# Patient Record
Sex: MALE | Race: WHITE | NOT HISPANIC OR LATINO | Employment: OTHER | URBAN - METROPOLITAN AREA
[De-identification: names, ages, dates, MRNs, and addresses within clinical notes are randomized per-mention and may not be internally consistent; named-entity substitution may affect disease eponyms.]

---

## 2018-07-02 ENCOUNTER — HOSPITAL ENCOUNTER (OUTPATIENT)
Dept: RADIOLOGY | Facility: MEDICAL CENTER | Age: 78
End: 2018-07-02

## 2018-07-02 ENCOUNTER — HOSPITAL ENCOUNTER (INPATIENT)
Facility: MEDICAL CENTER | Age: 78
LOS: 3 days | DRG: 065 | End: 2018-07-05
Attending: INTERNAL MEDICINE | Admitting: INTERNAL MEDICINE
Payer: COMMERCIAL

## 2018-07-02 LAB
ALBUMIN SERPL BCP-MCNC: 4.7 G/DL (ref 3.2–4.9)
ALBUMIN/GLOB SERPL: 1.7 G/DL
ALP SERPL-CCNC: 60 U/L (ref 30–99)
ALT SERPL-CCNC: 12 U/L (ref 2–50)
ANION GAP SERPL CALC-SCNC: 10 MMOL/L (ref 0–11.9)
AST SERPL-CCNC: 17 U/L (ref 12–45)
BILIRUB SERPL-MCNC: 1.1 MG/DL (ref 0.1–1.5)
BUN SERPL-MCNC: 16 MG/DL (ref 8–22)
CALCIUM SERPL-MCNC: 10 MG/DL (ref 8.5–10.5)
CHLORIDE SERPL-SCNC: 99 MMOL/L (ref 96–112)
CO2 SERPL-SCNC: 25 MMOL/L (ref 20–33)
CREAT SERPL-MCNC: 1.17 MG/DL (ref 0.5–1.4)
ERYTHROCYTE [DISTWIDTH] IN BLOOD BY AUTOMATED COUNT: 61 FL (ref 35.9–50)
GLOBULIN SER CALC-MCNC: 2.8 G/DL (ref 1.9–3.5)
GLUCOSE SERPL-MCNC: 114 MG/DL (ref 65–99)
HCT VFR BLD AUTO: 51.8 % (ref 42–52)
HGB BLD-MCNC: 17.2 G/DL (ref 14–18)
MCH RBC QN AUTO: 31.3 PG (ref 27–33)
MCHC RBC AUTO-ENTMCNC: 33.2 G/DL (ref 33.7–35.3)
MCV RBC AUTO: 94.4 FL (ref 81.4–97.8)
PLATELET # BLD AUTO: 287 K/UL (ref 164–446)
PMV BLD AUTO: 10.5 FL (ref 9–12.9)
POTASSIUM SERPL-SCNC: 3.7 MMOL/L (ref 3.6–5.5)
PROT SERPL-MCNC: 7.5 G/DL (ref 6–8.2)
RBC # BLD AUTO: 5.49 M/UL (ref 4.7–6.1)
SODIUM SERPL-SCNC: 134 MMOL/L (ref 135–145)
WBC # BLD AUTO: 9.2 K/UL (ref 4.8–10.8)

## 2018-07-02 PROCEDURE — 770020 HCHG ROOM/CARE - TELE (206)

## 2018-07-02 PROCEDURE — 85027 COMPLETE CBC AUTOMATED: CPT

## 2018-07-02 PROCEDURE — 94760 N-INVAS EAR/PLS OXIMETRY 1: CPT

## 2018-07-02 PROCEDURE — 80053 COMPREHEN METABOLIC PANEL: CPT

## 2018-07-02 PROCEDURE — 700105 HCHG RX REV CODE 258: Performed by: STUDENT IN AN ORGANIZED HEALTH CARE EDUCATION/TRAINING PROGRAM

## 2018-07-02 PROCEDURE — 36415 COLL VENOUS BLD VENIPUNCTURE: CPT

## 2018-07-02 RX ORDER — LANOLIN ALCOHOL/MO/W.PET/CERES
2000 CREAM (GRAM) TOPICAL DAILY
COMMUNITY

## 2018-07-02 RX ORDER — BISACODYL 10 MG
10 SUPPOSITORY, RECTAL RECTAL
Status: DISCONTINUED | OUTPATIENT
Start: 2018-07-02 | End: 2018-07-05 | Stop reason: HOSPADM

## 2018-07-02 RX ORDER — AMLODIPINE BESYLATE 2.5 MG/1
2.5 TABLET ORAL DAILY
Status: ON HOLD | COMMUNITY
End: 2018-07-05

## 2018-07-02 RX ORDER — AMOXICILLIN 250 MG
2 CAPSULE ORAL 2 TIMES DAILY
Status: DISCONTINUED | OUTPATIENT
Start: 2018-07-02 | End: 2018-07-05 | Stop reason: HOSPADM

## 2018-07-02 RX ORDER — POLYETHYLENE GLYCOL 3350 17 G/17G
1 POWDER, FOR SOLUTION ORAL
Status: DISCONTINUED | OUTPATIENT
Start: 2018-07-02 | End: 2018-07-05 | Stop reason: HOSPADM

## 2018-07-02 RX ORDER — SODIUM CHLORIDE 9 MG/ML
INJECTION, SOLUTION INTRAVENOUS CONTINUOUS
Status: DISCONTINUED | OUTPATIENT
Start: 2018-07-02 | End: 2018-07-04

## 2018-07-02 RX ORDER — TAMSULOSIN HYDROCHLORIDE 0.4 MG/1
0.4 CAPSULE ORAL
COMMUNITY
End: 2020-09-07

## 2018-07-02 RX ORDER — LOSARTAN POTASSIUM 50 MG/1
50 TABLET ORAL DAILY
Status: ON HOLD | COMMUNITY
End: 2018-07-05

## 2018-07-02 RX ADMIN — SODIUM CHLORIDE: 9 INJECTION, SOLUTION INTRAVENOUS at 20:52

## 2018-07-02 ASSESSMENT — COPD QUESTIONNAIRES
COPD SCREENING SCORE: 4
DURING THE PAST 4 WEEKS HOW MUCH DID YOU FEEL SHORT OF BREATH: NONE/LITTLE OF THE TIME
DO YOU EVER COUGH UP ANY MUCUS OR PHLEGM?: NO/ONLY WITH OCCASIONAL COLDS OR INFECTIONS
IN THE PAST 12 MONTHS DO YOU DO LESS THAN YOU USED TO BECAUSE OF YOUR BREATHING PROBLEMS: DISAGREE/UNSURE
HAVE YOU SMOKED AT LEAST 100 CIGARETTES IN YOUR ENTIRE LIFE: YES
HAVE YOU SMOKED AT LEAST 100 CIGARETTES IN YOUR ENTIRE LIFE: YES
DURING THE PAST 4 WEEKS HOW MUCH DID YOU FEEL SHORT OF BREATH: NONE/LITTLE OF THE TIME
COPD SCREENING SCORE: 4
DO YOU EVER COUGH UP ANY MUCUS OR PHLEGM?: NO/ONLY WITH OCCASIONAL COLDS OR INFECTIONS

## 2018-07-02 ASSESSMENT — COGNITIVE AND FUNCTIONAL STATUS - GENERAL
SUGGESTED CMS G CODE MODIFIER MOBILITY: CH
SUGGESTED CMS G CODE MODIFIER DAILY ACTIVITY: CH
MOBILITY SCORE: 24
DAILY ACTIVITIY SCORE: 24

## 2018-07-02 ASSESSMENT — PAIN SCALES - GENERAL: PAINLEVEL_OUTOF10: 0

## 2018-07-02 ASSESSMENT — LIFESTYLE VARIABLES
EVER_SMOKED: YES
ALCOHOL_USE: NO

## 2018-07-02 ASSESSMENT — PATIENT HEALTH QUESTIONNAIRE - PHQ9
1. LITTLE INTEREST OR PLEASURE IN DOING THINGS: NOT AT ALL
2. FEELING DOWN, DEPRESSED, IRRITABLE, OR HOPELESS: NOT AT ALL
2. FEELING DOWN, DEPRESSED, IRRITABLE, OR HOPELESS: NOT AT ALL
SUM OF ALL RESPONSES TO PHQ9 QUESTIONS 1 AND 2: 0
1. LITTLE INTEREST OR PLEASURE IN DOING THINGS: NOT AT ALL
SUM OF ALL RESPONSES TO PHQ9 QUESTIONS 1 AND 2: 0

## 2018-07-03 ENCOUNTER — APPOINTMENT (OUTPATIENT)
Dept: RADIOLOGY | Facility: MEDICAL CENTER | Age: 78
DRG: 065 | End: 2018-07-03
Attending: RADIOLOGY
Payer: COMMERCIAL

## 2018-07-03 PROBLEM — I35.0 SEVERE AORTIC STENOSIS: Status: ACTIVE | Noted: 2018-07-03

## 2018-07-03 PROBLEM — I63.131 CEREBROVASCULAR ACCIDENT (CVA) DUE TO EMBOLISM OF RIGHT CAROTID ARTERY (HCC): Status: ACTIVE | Noted: 2018-07-03

## 2018-07-03 PROBLEM — J44.9 COPD (CHRONIC OBSTRUCTIVE PULMONARY DISEASE) (HCC): Status: ACTIVE | Noted: 2018-07-03

## 2018-07-03 PROBLEM — I10 HTN (HYPERTENSION): Status: ACTIVE | Noted: 2018-07-03

## 2018-07-03 LAB
CHOLEST SERPL-MCNC: 123 MG/DL (ref 100–199)
HDLC SERPL-MCNC: 32 MG/DL
INR PPP: 1.1 (ref 0.87–1.13)
LDLC SERPL CALC-MCNC: 72 MG/DL
MAGNESIUM SERPL-MCNC: 2.1 MG/DL (ref 1.5–2.5)
PROTHROMBIN TIME: 13.9 SEC (ref 12–14.6)
TRIGL SERPL-MCNC: 97 MG/DL (ref 0–149)

## 2018-07-03 PROCEDURE — 83735 ASSAY OF MAGNESIUM: CPT

## 2018-07-03 PROCEDURE — 36415 COLL VENOUS BLD VENIPUNCTURE: CPT

## 2018-07-03 PROCEDURE — 700101 HCHG RX REV CODE 250: Performed by: STUDENT IN AN ORGANIZED HEALTH CARE EDUCATION/TRAINING PROGRAM

## 2018-07-03 PROCEDURE — 770020 HCHG ROOM/CARE - TELE (206)

## 2018-07-03 PROCEDURE — 700105 HCHG RX REV CODE 258: Performed by: STUDENT IN AN ORGANIZED HEALTH CARE EDUCATION/TRAINING PROGRAM

## 2018-07-03 PROCEDURE — 700102 HCHG RX REV CODE 250 W/ 637 OVERRIDE(OP): Performed by: STUDENT IN AN ORGANIZED HEALTH CARE EDUCATION/TRAINING PROGRAM

## 2018-07-03 PROCEDURE — 99223 1ST HOSP IP/OBS HIGH 75: CPT | Mod: GC | Performed by: INTERNAL MEDICINE

## 2018-07-03 PROCEDURE — 85610 PROTHROMBIN TIME: CPT

## 2018-07-03 PROCEDURE — A9270 NON-COVERED ITEM OR SERVICE: HCPCS | Performed by: STUDENT IN AN ORGANIZED HEALTH CARE EDUCATION/TRAINING PROGRAM

## 2018-07-03 PROCEDURE — 80061 LIPID PANEL: CPT

## 2018-07-03 RX ORDER — AMLODIPINE BESYLATE 5 MG/1
5 TABLET ORAL DAILY
Status: DISCONTINUED | OUTPATIENT
Start: 2018-07-04 | End: 2018-07-04

## 2018-07-03 RX ORDER — LOSARTAN POTASSIUM 50 MG/1
100 TABLET ORAL DAILY
Status: DISCONTINUED | OUTPATIENT
Start: 2018-07-04 | End: 2018-07-05 | Stop reason: HOSPADM

## 2018-07-03 RX ORDER — LOSARTAN POTASSIUM 50 MG/1
50 TABLET ORAL DAILY
Status: DISCONTINUED | OUTPATIENT
Start: 2018-07-03 | End: 2018-07-03

## 2018-07-03 RX ORDER — CLOPIDOGREL BISULFATE 75 MG/1
75 TABLET ORAL DAILY
Status: DISCONTINUED | OUTPATIENT
Start: 2018-07-03 | End: 2018-07-05 | Stop reason: HOSPADM

## 2018-07-03 RX ORDER — ATORVASTATIN CALCIUM 80 MG/1
80 TABLET, FILM COATED ORAL NIGHTLY
COMMUNITY
End: 2020-09-07

## 2018-07-03 RX ORDER — CLOPIDOGREL BISULFATE 75 MG/1
75 TABLET ORAL DAILY
Status: ON HOLD | COMMUNITY
End: 2018-07-05

## 2018-07-03 RX ORDER — ATORVASTATIN CALCIUM 80 MG/1
80 TABLET, FILM COATED ORAL
Status: DISCONTINUED | OUTPATIENT
Start: 2018-07-03 | End: 2018-07-05 | Stop reason: HOSPADM

## 2018-07-03 RX ORDER — AMLODIPINE BESYLATE 5 MG/1
2.5 TABLET ORAL DAILY
Status: DISCONTINUED | OUTPATIENT
Start: 2018-07-03 | End: 2018-07-03

## 2018-07-03 RX ORDER — UBIDECARENONE 75 MG
100 CAPSULE ORAL DAILY
Status: DISCONTINUED | OUTPATIENT
Start: 2018-07-03 | End: 2018-07-05 | Stop reason: HOSPADM

## 2018-07-03 RX ORDER — LABETALOL HYDROCHLORIDE 5 MG/ML
10 INJECTION, SOLUTION INTRAVENOUS EVERY 4 HOURS PRN
Status: DISCONTINUED | OUTPATIENT
Start: 2018-07-03 | End: 2018-07-05

## 2018-07-03 RX ADMIN — ATORVASTATIN CALCIUM 80 MG: 80 TABLET, FILM COATED ORAL at 20:46

## 2018-07-03 RX ADMIN — SODIUM CHLORIDE: 9 INJECTION, SOLUTION INTRAVENOUS at 09:01

## 2018-07-03 RX ADMIN — LABETALOL HYDROCHLORIDE 10 MG: 5 INJECTION, SOLUTION INTRAVENOUS at 15:51

## 2018-07-03 RX ADMIN — SODIUM CHLORIDE: 9 INJECTION, SOLUTION INTRAVENOUS at 21:17

## 2018-07-03 ASSESSMENT — ENCOUNTER SYMPTOMS
WEAKNESS: 0
SPEECH CHANGE: 0
TREMORS: 1
TINGLING: 0
PHOTOPHOBIA: 0
PALPITATIONS: 0
COUGH: 0
DOUBLE VISION: 0
ORTHOPNEA: 0
FOCAL WEAKNESS: 0
DIZZINESS: 0
VOMITING: 0
HEADACHES: 0
LOSS OF CONSCIOUSNESS: 0
NAUSEA: 0
BLURRED VISION: 0
TREMORS: 0
FEVER: 0
SENSORY CHANGE: 0
SHORTNESS OF BREATH: 0
CHILLS: 0

## 2018-07-03 ASSESSMENT — PAIN SCALES - GENERAL
PAINLEVEL_OUTOF10: 0

## 2018-07-03 ASSESSMENT — LIFESTYLE VARIABLES: SUBSTANCE_ABUSE: 1

## 2018-07-03 NOTE — PROGRESS NOTES
Pt resting in bed at this time.  No complaints of pain, call light within reach.  Bed locked and in lowest position.

## 2018-07-03 NOTE — H&P
Senior Admission Note    In summary: Bg Andrea is a 77 y.o. male with past medical history of  HTN, DLD, COPD, and BPH was transferred to [facility] from [facility] for IR intervention. [facility] and shakiness. On admission patient was out of tPA window and CT head was negative. Patient was started on high dose statin. [facility] and did not recommended anticoagulation due to increase risk of hemorrhagic conversion. MRI showed concern for embolic or watershed distribution. Patient was also found to have distal right ICA stenosis as it enters the inferior portion of the cavernous sinus. Case was discussed with IR here in [facility] and that recommended starting the patient on aspirin and Plavix.       Physical Exam   Constitutional:  oriented to person, place, and time. No distress.   HEENT: grossly normal   Cardiovascular: Normal heart rate, Normal rhythm   Lungs: Respiratory effort is normal. Normal breath sounds  Abdomen: Bowel sounds normal, Soft, No tenderness  Skin: No erythema, No rash  Lower limbs: normal, no pitting edema   Neurologic: Alert & oriented x 3, Normal motor function, Normal sensory function, No focal deficits noted, cranial nerves II through XII are normal  PSY: stable mood.       Pertinent studies: MRI showed concern for embolic or watershed distribution. Patient was also found to have distal right ICA stenosis.        Assessment and plan in summary:    #CVA  - [facility] due to Right ICA stenosis   - found to have multiple right-side infarct most likely secondary to carotid artery embolism.   - at the time of evaluation patient did not had any neurologic deficits.   Plan   -telemetry   - NPO for IR procedure today. [facility] the [facility] discussed the case with IR (Dr Burrows)   - IV fluids   - Neuro checks/fall/seizure precautions.   - atorvastatin 80mg   - ASA and Plavix -  patient [facility] the [facility]   - BP control - home medications losartan and amlodipine  - PT evaluation      #COPD  - history of COPD   - no acute exacerbation. No wheezing on exam   - RT protocol / pulse ox     # HTN   - continue home medications     # Severe aortic stenosis   - [facility]. Patient not interested in further treatment at this time       For full plan, please see Intern note for details   Rodger Olmstead M.D.  PGY 2

## 2018-07-03 NOTE — PROGRESS NOTES
BP recheck 183/86. Pt denies pain/discomfort. Unlabored breathing on room air. Continuing to monitor.

## 2018-07-03 NOTE — PROGRESS NOTES
Patient arrived as a direct admit in stable condition. aaox4 able to verbalize needs. Plan of care updated and verbalizes understanding. No pain and no distress noted at this time. Patient aware of NPO order in preparation for IR procedure tomorrow. Patient tolerating IVF 83cc/hr with no complications. Patient ambulatory with needs met at this time. Will continue to monitor closely. Safety measures in place. Call light within reach

## 2018-07-03 NOTE — THERAPY
asked by residents to see pt for swallow eval this AM. Pt NPO for procedure late this afternoon but per MD is okay for limited amount of PO for the swallow eval. No orders entered this AM. SLP d/w nurs following conversation with MD's and with request for orders. SLP f/u with nurs at approx noon regarding re-request for swallow eval orders. SLP also collaborated with nurs with plan for eval on Tues since swallow eval will not occur until early this afternoon and pt is pending a procedure that requires NPO. [email or web address]

## 2018-07-03 NOTE — PROGRESS NOTES
Direct admit from [facility]. Accepted by Dr. Mullins for carotid stenosis, CVA.  Please call the [facility] resident on pt arrival for orders. No written orders received.  Pt coming by ground.

## 2018-07-03 NOTE — ASSESSMENT & PLAN NOTE
-Pt had high BP up into the 190/100's throughout his hospital stay that was difficult to control, needed multiple prn meds including enalapril, labetolol, and clonidine  -increased home meds to losartan 100 mg qd, amlodipine 10 mg qd, and HCTZ 12.5 qd  -[facility]

## 2018-07-03 NOTE — PROGRESS NOTES
Bedside report received from night shift RN, assumed pt care. Pt assessment complete. Pt a&ox4, no complaints of pain. Reviewed plan of care with pt. Tele box on and rhythm verified. Chart and labs reviewed.  Bed in lowest position, call light within reach. Hourly rounding in place. Educated about calling for assistance.

## 2018-07-03 NOTE — PROGRESS NOTES
Skin assessment complete with Becky RN. Skin is warm, clean dry and intact. No open areas observed.

## 2018-07-03 NOTE — THERAPY
PT order received; Pt is currently awaiting plan of care to be established and possible IR procedure today. Will hold therapy evaluation and re-attempt as appropriate.

## 2018-07-03 NOTE — H&P
Internal Medicine Admitting History and Physical    Note Author: Ry Vieyra M.D.       Name Bg Andrea 1940   Age/Sex 77 y.o. male   MRN [id number]   Code Status Full     After 5PM or if no immediate response to page, please call for cross-coverage  Attending/Team: Dr. Zamudio See Patient List for primary contact information  Call [phone] to page    1st Call - Day Intern (R1):   Dr. Moreno 2nd Call - Day Sr. Resident (R2/R3):   Dr. Trimble       Chief Complaint:   CVA    HPI:  Mr. Andrea is a pleasant 77 year old male who [facility] acute/sub-acute CVA of likely embolic origin. [facility]  [facility]or and was found to have 3 areas of infarct on the right side of his brain. An ultrasound performed found <50% carotid stenosis. [facility] the [facility] he had received 2 days of dual antiplatelet therapy(clopidogrel/aspirin), but no tPA and was transferred for specialist care from .    On admission he denied any symptoms, stating that the previously mentioned left arm tremor had completely resolved. He denied any left sided weakness, vision changes, facial weakness, altered sensation, dizziness, dysphagia.    Review of Systems   Constitutional: Negative for chills and fever.   HENT: Negative for hearing loss.    Eyes: Negative for blurred vision, double vision and photophobia.   Respiratory: Negative for cough and shortness of breath.    Cardiovascular: Negative for chest pain.   Gastrointestinal: Negative for nausea and vomiting.   Skin: Negative for rash.   Neurological: Positive for tremors (now gone). Negative for dizziness, sensory change, focal weakness, weakness and headaches.   Psychiatric/Behavioral: Positive for substance abuse (smoking).             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 (Registered Nurse) on 18 at 1944  Med List Status: Complete   Medication Last Dose Status   amLODIPine (NORVASC) 2.5 MG Tab 2018 Active   cyanocobalamin (VITAMIN B-12) 100 MCG Tab 2018 Active   losartan (COZAAR) 50 MG Tab 2018 Active   tamsulosin (FLOMAX) 0.4 MG capsule 2018 Active   vitamin D (CHOLECALCIFEROL) 1000 UNIT Tab 2018 Active                Medication Allergy/Sensitivities:  No Known Allergies      Family History (mandatory)   Family History   Problem Relation Age of Onset   • Cancer Neg Hx    • Heart Disease Neg Hx    Does not recall other than parents  of \"old age\" both in 90s    Social History (mandatory)   Social History     Social History   • Marital status: N/A     Spouse name: N/A   • Number of children: N/A   • Years of education: N/A     Occupational History   • Not on file.     Social History Main Topics   • Smoking status: Current Every Day Smoker     Packs/day: 0.50     Years: 60.00     Types: Cigarettes     Start date: 1950   • Smokeless tobacco: Never Used   • Alcohol use Yes      Comment: very seldom   • Drug use: No   • Sexual activity: Not on file     Other Topics Concern   • Not on file     Social History Narrative   • No narrative on file     Living situation: Lives with Wife  [facility]    Physical Exam     Vitals:    18 1900 18 2244   SpO2:  94%   Weight: 78.9 kg (173 lb 15.1 oz)    Height: 0.67 m (2' 2.38\")      Body mass index is 175.76 kg/m².  Ht 0.67 m " "(2' 2.38\")   Wt 78.9 kg (173 lb 15.1 oz)   SpO2 94%   .76 kg/m²   O2 therapy: Pulse Oximetry: 94 %, O2 (LPM): 0    Physical Exam   Constitutional: He is oriented to person, place, and time and well-developed, well-nourished, and in no distress.   HENT:   Mallampati 3   Neck: Carotid bruit is not present.   Cardiovascular: Normal rate, regular rhythm, S1 normal, S2 normal, normal heart sounds and intact distal pulses.  Exam reveals no gallop.    No murmur heard.  Faint   Pulmonary/Chest: He has decreased breath sounds. He has no wheezes. He has no rales.   Abdominal: Bowel sounds are normal. There is no tenderness.   Musculoskeletal:        Right lower leg: He exhibits no edema.        Left lower leg: He exhibits no edema.   Neurological: He is alert and oriented to person, place, and time. He has normal sensation, normal strength and intact cranial nerves. He displays no tremor and facial symmetry. He has a normal Cerebellar Exam, a normal Finger-Nose-Finger Test and a normal Romberg Test. Gait normal.         Data Review       Old Records Request:   Completed  Current Records review/summary: Completed    Lab Data Review:  Recent Results (from the past 24 hour(s))   CBC without Differential    Collection Time: 07/02/18  8:52 PM   Result Value Ref Range    WBC 9.2 4.8 - 10.8 K/uL    RBC 5.49 4.70 - 6.10 M/uL    Hemoglobin 17.2 14.0 - 18.0 g/dL    Hematocrit 51.8 42.0 - 52.0 %    MCV 94.4 81.4 - 97.8 fL    MCH 31.3 27.0 - 33.0 pg    MCHC 33.2 (L) 33.7 - 35.3 g/dL    RDW 61.0 (H) 35.9 - 50.0 fL    Platelet Count 287 164 - 446 K/uL    MPV 10.5 9.0 - 12.9 fL   Comp Metabolic Panel (CMP)    Collection Time: 07/02/18  8:52 PM   Result Value Ref Range    Sodium 134 (L) 135 - 145 mmol/L    Potassium 3.7 3.6 - 5.5 mmol/L    Chloride 99 96 - 112 mmol/L    Co2 25 20 - 33 mmol/L    Anion Gap 10.0 0.0 - 11.9    Glucose 114 (H) 65 - 99 mg/dL    Bun 16 8 - 22 mg/dL    Creatinine 1.17 0.50 - 1.40 mg/dL    Calcium 10.0 8.5 " - 10.5 mg/dL    AST(SGOT) 17 12 - 45 U/L    ALT(SGPT) 12 2 - 50 U/L    Alkaline Phosphatase 60 30 - 99 U/L    Total Bilirubin 1.1 0.1 - 1.5 mg/dL    Albumin 4.7 3.2 - 4.9 g/dL    Total Protein 7.5 6.0 - 8.2 g/dL    Globulin 2.8 1.9 - 3.5 g/dL    A-G Ratio 1.7 g/dL   ESTIMATED GFR    Collection Time: 07/02/18  8:52 PM   Result Value Ref Range    GFR If African American >60 >60 mL/min/1.73 m 2    GFR If Non African American 60 >60 mL/min/1.73 m 2       Imaging/Procedures Review:    Independant Imaging Review: Completed  No orders to display        [facility] MRI: Multiple punctate ischemic events on right side; one in centrum semiovale and one in posterior parietal zone.     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 Did not receive tPA, was on Plavix/Aspirin when arrived. Clinically has completely recovered and has no complaints.   Will be seen by IR tomorrow for procedure; NPO at midnight with NS @ 83mL/hr  Continue Plavix 75/Aspirin 81 dual antiplatelet therapy  Holding pharmacologic DVT prophylaxis for high risk of conversion to hemorrhagic stroke - SCD  PT  Fall/sz precautions  Atorvastatin 80  Neuro checks Q4H  Per IR would like BP control - continuing home losartan 50 and amlodipine 2.5mg  Recommend to day team to follow up on echo/EKG results from [facility] to confirm that a. Fib was unlikely to be contributory.          HTN (hypertension)   Assessment & Plan    Per [facility] discharge summary IR d/c permissive HTN and would like HTN to be treated, likely because >48h have passed since CVA  Continue home losartan 50 and amlodipine 10          COPD (chronic obstructive pulmonary disease) (HCC)- (present on admission)   Assessment & Plan     History of COPD on albuterol/ipratroprium  RT protocol          Severe aortic stenosis- (present on admission)   Assessment & Plan    [facility] diagnosed [facility], but patient has stated in [facility] documentation that he is not interested in pursuing further treatment.              Anticipated Hospital stay:  >2 midnights        Quality Measures  Quality-Core Measures   Reviewed items::  Labs reviewed and Medications reviewed  Matt catheter::  No Matt  DVT prophylaxis pharmacological::  Contraindicated - High bleeding risk  DVT prophylaxis - mechanical:  SCDs    PCP: No primary care provider on file.

## 2018-07-03 NOTE — PROGRESS NOTES
Internal Medicine Interval Note  Note Author: Aure Moreno M.D.     Name Bg Andrea 1940   Age/Sex 77 y.o. male   MRN [id number]   Code Status Full     After 5PM or if no immediate response to page, please call for cross-coverage  Attending/Team: Dr. Zamudio/WALLY Cabrera See Patient List for primary contact information  Call [phone] to page    1st Call - Day Intern (R1):   Dr Moreno 2nd Call - Day Sr. Resident (R2/R3):   Dr Trimble         Reason for interval visit  (Principal Problem)   CVA      Interval Problem Daily Status Update  (24 hours, problem oriented, brief subjective history, new lab/imaging data pertinent to that problem)   [facility]. He was admitted after having sx of left arm tremors and weakness. [facility] in the [facility] ER initially did not comment on ICA stenosis but a second read noted right sided critical stenosis of the distal right ICA. [facility] the [facility] and they believed stenosis was too distal for endarterectomy and recommended stent placement by IR. Pt was then transferred to [facility] for IR procedure. The patient no longer is experiencing tremors or weakness and has no other neurological symptoms. He was started on ASA and plavix upon transfer. His BP was elevated since admission reaching 180/80 this afternoon, so his home med of 2.5 mg amlodipine was increased to 5 mg and home med losartan 25 mg qd was kept the same.   Review of Systems   Constitutional: Negative for chills and fever.   HENT: Positive for hearing loss.         Hearing loss is chronic problem   Eyes: Negative for blurred vision and double vision.   Respiratory: Negative for cough.    Cardiovascular: Negative for chest pain, palpitations and orthopnea.   Neurological: Negative for dizziness, tingling, tremors, sensory change, speech change, focal weakness, loss of consciousness and headaches.       Disposition/Barriers to discharge:    Patient needs to continue as an inpatient for evaluation and treatment of his CVA.     Consultants/Specialty  IR - Dr. Burrows  PCP: @PCP      Quality Measures  Quality-Core Measures   Reviewed items::  Labs reviewed, Medications reviewed and Radiology images reviewed  Matt catheter::  No Matt  DVT: being treated with plavix and asa for CVA at this time.          Physical Exam       Vitals:    07/02/18 2244 07/02/18 2348 07/03/18 0800 07/03/18 1200   BP:  (!) 178/96 150/71 (!) 180/77   Pulse:  65 61 64   Resp:  16 20 18   Temp:  36.4 °C (97.5 °F) 36.6 °C (97.9 °F) 36.8 °C (98.2 °F)   SpO2: 94% 95% 90% 96%   Weight:       Height:         Body mass index is 27.24 kg/m². Weight: 78.9 kg (173 lb 15.1 oz)  Oxygen Therapy:  Pulse Oximetry: 96 %, O2 (LPM): 0, O2 Delivery: None (Room Air)    Physical Exam   Constitutional: He is oriented to person, place, and time and well-developed, well-nourished, and in no distress.   HENT:   Head: Normocephalic and atraumatic.   Eyes: Pupils are equal, round, and reactive to light.   Cardiovascular: Normal rate and regular rhythm.    Murmur heard.  Neurological: He is alert and oriented to person, place, and time. He has normal sensation, normal strength and intact cranial nerves. He is not disoriented. He displays no weakness, no tremor and facial symmetry. He has a normal Cerebellar Exam. He shows no pronator drift. GCS score is 15.   Skin: Skin is warm and dry.   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 Did not receive tPA, was on Plavix/Aspirin when arrived. Clinically has completely recovered and has no complaints.   Will be seen by IR tomorrow for procedure; NPO at midnight with NS @ 83mL/hr  Continue Plavix 75/Aspirin 81 dual antiplatelet therapy  Holding  pharmacologic DVT prophylaxis for high risk of conversion to hemorrhagic stroke - SCD  PT  Fall/sz precautions  Atorvastatin 80  Neuro checks Q4H  Per IR would like BP control - continuing home losartan 50 and amlodipine 2.5mg  Recommend to day team to follow up on echo/EKG results from [facility] to confirm that a. Fib was unlikely to be contributory.          HTN (hypertension)   Assessment & Plan    Per [facility] discharge summary IR d/c permissive HTN and would like HTN to be treated, likely because >48h have passed since CVA  Continue home losartan 50 and amlodipine 10          COPD (chronic obstructive pulmonary disease) (HCC)- (present on admission)   Assessment & Plan    History of COPD on albuterol/ipratroprium  RT protocol          Severe aortic stenosis- (present on admission)   Assessment & Plan    [facility] diagnosed [facility], but patient has stated in [facility] documentation that he is not interested in pursuing further treatment.

## 2018-07-03 NOTE — CARE PLAN
Problem: Communication  Goal: The ability to communicate needs accurately and effectively will improve  Outcome: PROGRESSING AS EXPECTED  Plan of care updated and verbalizes understanding  Medication use and side effects discussed  Encouraged to verbalize needs    Problem: Knowledge Deficit  Goal: Knowledge of disease process/condition, treatment plan, diagnostic tests, and medications will improve  Outcome: PROGRESSING AS EXPECTED  Educated on NPO status for IR procedure tomorrow  Medication use and side effects discussed

## 2018-07-03 NOTE — ASSESSMENT & PLAN NOTE
Multiple right-sided infarcts likely secondary to carotid artery embolism. Did not receive tPA, was on Plavix/Aspirin when arrived. Clinically has completely recovered and had no complaints.   CTA 7/4/18 did not show any significant carotid stenosis  Patient was monitored on tele for 48 hours and no events of a fib  PT and speech saw the patient and there is no need at this point     Plan:  Continue Plavix 75/Aspirin 81 dual antiplatelet therapy  Continue atorvastatin 80 mg qd   [facility] with [facility] upon [facility]. There was no significant stenosis of the carotids. Pt should be evaluated to rule out a fib as a cause with 30 day rhythm monitoring

## 2018-07-04 ENCOUNTER — APPOINTMENT (OUTPATIENT)
Dept: RADIOLOGY | Facility: MEDICAL CENTER | Age: 78
DRG: 065 | End: 2018-07-04
Attending: RADIOLOGY
Payer: COMMERCIAL

## 2018-07-04 LAB
ANION GAP SERPL CALC-SCNC: 8 MMOL/L (ref 0–11.9)
APTT PPP: 24.4 SEC (ref 24.7–36)
BASOPHILS # BLD AUTO: 1.4 % (ref 0–1.8)
BASOPHILS # BLD: 0.12 K/UL (ref 0–0.12)
BUN SERPL-MCNC: 13 MG/DL (ref 8–22)
CALCIUM SERPL-MCNC: 8.9 MG/DL (ref 8.5–10.5)
CFT BLD TEG: 3.2 MIN (ref 5–10)
CHLORIDE SERPL-SCNC: 107 MMOL/L (ref 96–112)
CLOT ANGLE BLD TEG: 68.6 DEGREES (ref 53–72)
CLOT LYSIS 30M P MA LENFR BLD TEG: 0 % (ref 0–8)
CO2 SERPL-SCNC: 22 MMOL/L (ref 20–33)
CREAT SERPL-MCNC: 1.06 MG/DL (ref 0.5–1.4)
CT.EXTRINSIC BLD ROTEM: 1.5 MIN (ref 1–3)
EOSINOPHIL # BLD AUTO: 0.2 K/UL (ref 0–0.51)
EOSINOPHIL NFR BLD: 2.4 % (ref 0–6.9)
ERYTHROCYTE [DISTWIDTH] IN BLOOD BY AUTOMATED COUNT: 63.3 FL (ref 35.9–50)
ERYTHROCYTE [DISTWIDTH] IN BLOOD BY AUTOMATED COUNT: 63.7 FL (ref 35.9–50)
GLUCOSE SERPL-MCNC: 80 MG/DL (ref 65–99)
HCT VFR BLD AUTO: 50.2 % (ref 42–52)
HCT VFR BLD AUTO: 51.7 % (ref 42–52)
HGB BLD-MCNC: 15.8 G/DL (ref 14–18)
HGB BLD-MCNC: 16.3 G/DL (ref 14–18)
IMM GRANULOCYTES # BLD AUTO: 0.11 K/UL (ref 0–0.11)
IMM GRANULOCYTES NFR BLD AUTO: 1.3 % (ref 0–0.9)
INR PPP: 1.11 (ref 0.87–1.13)
LYMPHOCYTES # BLD AUTO: 2.1 K/UL (ref 1–4.8)
LYMPHOCYTES NFR BLD: 25.1 % (ref 22–41)
MCF BLD TEG: 66.8 MM (ref 50–70)
MCH RBC QN AUTO: 30.4 PG (ref 27–33)
MCH RBC QN AUTO: 30.4 PG (ref 27–33)
MCHC RBC AUTO-ENTMCNC: 31.5 G/DL (ref 33.7–35.3)
MCHC RBC AUTO-ENTMCNC: 31.5 G/DL (ref 33.7–35.3)
MCV RBC AUTO: 96.5 FL (ref 81.4–97.8)
MCV RBC AUTO: 96.7 FL (ref 81.4–97.8)
MONOCYTES # BLD AUTO: 0.91 K/UL (ref 0–0.85)
MONOCYTES NFR BLD AUTO: 10.9 % (ref 0–13.4)
NEUTROPHILS # BLD AUTO: 4.93 K/UL (ref 1.82–7.42)
NEUTROPHILS NFR BLD: 58.9 % (ref 44–72)
NRBC # BLD AUTO: 0 K/UL
NRBC BLD-RTO: 0 /100 WBC
PLATELET # BLD AUTO: 279 K/UL (ref 164–446)
PLATELET # BLD AUTO: 279 K/UL (ref 164–446)
PMV BLD AUTO: 10.5 FL (ref 9–12.9)
PMV BLD AUTO: 10.6 FL (ref 9–12.9)
POTASSIUM SERPL-SCNC: 3.6 MMOL/L (ref 3.6–5.5)
PROTHROMBIN TIME: 14 SEC (ref 12–14.6)
RBC # BLD AUTO: 5.19 M/UL (ref 4.7–6.1)
RBC # BLD AUTO: 5.36 M/UL (ref 4.7–6.1)
SODIUM SERPL-SCNC: 137 MMOL/L (ref 135–145)
TEG ALGORITHM TGALG: ABNORMAL
WBC # BLD AUTO: 11.9 K/UL (ref 4.8–10.8)
WBC # BLD AUTO: 8.4 K/UL (ref 4.8–10.8)

## 2018-07-04 PROCEDURE — A9270 NON-COVERED ITEM OR SERVICE: HCPCS | Performed by: STUDENT IN AN ORGANIZED HEALTH CARE EDUCATION/TRAINING PROGRAM

## 2018-07-04 PROCEDURE — G8980 MOBILITY D/C STATUS: HCPCS | Mod: CI

## 2018-07-04 PROCEDURE — 700111 HCHG RX REV CODE 636 W/ 250 OVERRIDE (IP): Performed by: STUDENT IN AN ORGANIZED HEALTH CARE EDUCATION/TRAINING PROGRAM

## 2018-07-04 PROCEDURE — 700101 HCHG RX REV CODE 250: Performed by: STUDENT IN AN ORGANIZED HEALTH CARE EDUCATION/TRAINING PROGRAM

## 2018-07-04 PROCEDURE — 85347 COAGULATION TIME ACTIVATED: CPT

## 2018-07-04 PROCEDURE — 700102 HCHG RX REV CODE 250 W/ 637 OVERRIDE(OP): Performed by: STUDENT IN AN ORGANIZED HEALTH CARE EDUCATION/TRAINING PROGRAM

## 2018-07-04 PROCEDURE — B3151ZZ FLUOROSCOPY OF BILATERAL COMMON CAROTID ARTERIES USING LOW OSMOLAR CONTRAST: ICD-10-PCS | Performed by: RADIOLOGY

## 2018-07-04 PROCEDURE — G8979 MOBILITY GOAL STATUS: HCPCS | Mod: CI

## 2018-07-04 PROCEDURE — 85610 PROTHROMBIN TIME: CPT

## 2018-07-04 PROCEDURE — B3121ZZ FLUOROSCOPY OF LEFT SUBCLAVIAN ARTERY USING LOW OSMOLAR CONTRAST: ICD-10-PCS | Performed by: RADIOLOGY

## 2018-07-04 PROCEDURE — 36415 COLL VENOUS BLD VENIPUNCTURE: CPT

## 2018-07-04 PROCEDURE — G8978 MOBILITY CURRENT STATUS: HCPCS | Mod: CI

## 2018-07-04 PROCEDURE — 700102 HCHG RX REV CODE 250 W/ 637 OVERRIDE(OP): Performed by: INTERNAL MEDICINE

## 2018-07-04 PROCEDURE — B3181ZZ FLUOROSCOPY OF BILATERAL INTERNAL CAROTID ARTERIES USING LOW OSMOLAR CONTRAST: ICD-10-PCS | Performed by: RADIOLOGY

## 2018-07-04 PROCEDURE — B31R1ZZ FLUOROSCOPY OF INTRACRANIAL ARTERIES USING LOW OSMOLAR CONTRAST: ICD-10-PCS | Performed by: RADIOLOGY

## 2018-07-04 PROCEDURE — 85027 COMPLETE CBC AUTOMATED: CPT

## 2018-07-04 PROCEDURE — 700117 HCHG RX CONTRAST REV CODE 255: Performed by: RADIOLOGY

## 2018-07-04 PROCEDURE — A9270 NON-COVERED ITEM OR SERVICE: HCPCS | Performed by: INTERNAL MEDICINE

## 2018-07-04 PROCEDURE — 700105 HCHG RX REV CODE 258: Performed by: STUDENT IN AN ORGANIZED HEALTH CARE EDUCATION/TRAINING PROGRAM

## 2018-07-04 PROCEDURE — 80048 BASIC METABOLIC PNL TOTAL CA: CPT

## 2018-07-04 PROCEDURE — 99233 SBSQ HOSP IP/OBS HIGH 50: CPT | Mod: GC | Performed by: INTERNAL MEDICINE

## 2018-07-04 PROCEDURE — 85576 BLOOD PLATELET AGGREGATION: CPT

## 2018-07-04 PROCEDURE — 36225 PLACE CATH SUBCLAVIAN ART: CPT

## 2018-07-04 PROCEDURE — 99153 MOD SED SAME PHYS/QHP EA: CPT

## 2018-07-04 PROCEDURE — 97161 PT EVAL LOW COMPLEX 20 MIN: CPT

## 2018-07-04 PROCEDURE — 36223 PLACE CATH CAROTID/INOM ART: CPT

## 2018-07-04 PROCEDURE — 700111 HCHG RX REV CODE 636 W/ 250 OVERRIDE (IP)

## 2018-07-04 PROCEDURE — 85384 FIBRINOGEN ACTIVITY: CPT

## 2018-07-04 PROCEDURE — 85025 COMPLETE CBC W/AUTO DIFF WBC: CPT

## 2018-07-04 PROCEDURE — B3111ZZ FLUOROSCOPY OF RIGHT BRACHIOCEPHALIC-SUBCLAVIAN ARTERY USING LOW OSMOLAR CONTRAST: ICD-10-PCS | Performed by: RADIOLOGY

## 2018-07-04 PROCEDURE — 85730 THROMBOPLASTIN TIME PARTIAL: CPT

## 2018-07-04 PROCEDURE — B31G1ZZ FLUOROSCOPY OF BILATERAL VERTEBRAL ARTERIES USING LOW OSMOLAR CONTRAST: ICD-10-PCS | Performed by: RADIOLOGY

## 2018-07-04 PROCEDURE — 770020 HCHG ROOM/CARE - TELE (206)

## 2018-07-04 PROCEDURE — 700111 HCHG RX REV CODE 636 W/ 250 OVERRIDE (IP): Performed by: RADIOLOGY

## 2018-07-04 PROCEDURE — 51798 US URINE CAPACITY MEASURE: CPT

## 2018-07-04 RX ORDER — MIDAZOLAM HYDROCHLORIDE 1 MG/ML
INJECTION INTRAMUSCULAR; INTRAVENOUS
Status: COMPLETED
Start: 2018-07-04 | End: 2018-07-04

## 2018-07-04 RX ORDER — LABETALOL HYDROCHLORIDE 5 MG/ML
10 INJECTION, SOLUTION INTRAVENOUS ONCE
Status: COMPLETED | OUTPATIENT
Start: 2018-07-04 | End: 2018-07-04

## 2018-07-04 RX ORDER — ONDANSETRON 2 MG/ML
4 INJECTION INTRAMUSCULAR; INTRAVENOUS PRN
Status: ACTIVE | OUTPATIENT
Start: 2018-07-04 | End: 2018-07-04

## 2018-07-04 RX ORDER — HYDROCHLOROTHIAZIDE 25 MG/1
12.5 TABLET ORAL
Status: DISCONTINUED | OUTPATIENT
Start: 2018-07-04 | End: 2018-07-05 | Stop reason: HOSPADM

## 2018-07-04 RX ORDER — MIDAZOLAM HYDROCHLORIDE 1 MG/ML
.5-2 INJECTION INTRAMUSCULAR; INTRAVENOUS PRN
Status: ACTIVE | OUTPATIENT
Start: 2018-07-04 | End: 2018-07-04

## 2018-07-04 RX ORDER — AMLODIPINE BESYLATE 5 MG/1
5 TABLET ORAL DAILY
Status: DISCONTINUED | OUTPATIENT
Start: 2018-07-05 | End: 2018-07-05

## 2018-07-04 RX ORDER — CLONIDINE HYDROCHLORIDE 0.1 MG/1
0.1 TABLET ORAL EVERY 4 HOURS PRN
Status: DISCONTINUED | OUTPATIENT
Start: 2018-07-04 | End: 2018-07-04

## 2018-07-04 RX ORDER — ENALAPRILAT 1.25 MG/ML
1.25 INJECTION INTRAVENOUS EVERY 6 HOURS PRN
Status: DISCONTINUED | OUTPATIENT
Start: 2018-07-04 | End: 2018-07-04

## 2018-07-04 RX ORDER — CLONIDINE HYDROCHLORIDE 0.1 MG/1
0.2 TABLET ORAL EVERY 4 HOURS PRN
Status: DISCONTINUED | OUTPATIENT
Start: 2018-07-04 | End: 2018-07-05

## 2018-07-04 RX ORDER — POTASSIUM CHLORIDE 20 MEQ/1
20 TABLET, EXTENDED RELEASE ORAL ONCE
Status: COMPLETED | OUTPATIENT
Start: 2018-07-04 | End: 2018-07-04

## 2018-07-04 RX ORDER — ENALAPRILAT 1.25 MG/ML
2.5 INJECTION INTRAVENOUS EVERY 6 HOURS PRN
Status: DISCONTINUED | OUTPATIENT
Start: 2018-07-04 | End: 2018-07-05

## 2018-07-04 RX ORDER — SODIUM CHLORIDE 9 MG/ML
500 INJECTION, SOLUTION INTRAVENOUS
Status: ACTIVE | OUTPATIENT
Start: 2018-07-04 | End: 2018-07-04

## 2018-07-04 RX ORDER — HYDRALAZINE HYDROCHLORIDE 20 MG/ML
20 INJECTION INTRAMUSCULAR; INTRAVENOUS EVERY 6 HOURS PRN
Status: DISCONTINUED | OUTPATIENT
Start: 2018-07-04 | End: 2018-07-04

## 2018-07-04 RX ORDER — AMLODIPINE BESYLATE 10 MG/1
10 TABLET ORAL DAILY
Status: DISCONTINUED | OUTPATIENT
Start: 2018-07-05 | End: 2018-07-04

## 2018-07-04 RX ADMIN — FENTANYL CITRATE 50 MCG: 50 INJECTION, SOLUTION INTRAMUSCULAR; INTRAVENOUS at 13:19

## 2018-07-04 RX ADMIN — VITAMIN D, TAB 1000IU (100/BT) 1000 UNITS: 25 TAB at 04:36

## 2018-07-04 RX ADMIN — LABETALOL HYDROCHLORIDE 10 MG: 5 INJECTION, SOLUTION INTRAVENOUS at 04:37

## 2018-07-04 RX ADMIN — LABETALOL HYDROCHLORIDE 10 MG: 5 INJECTION, SOLUTION INTRAVENOUS at 10:20

## 2018-07-04 RX ADMIN — MIDAZOLAM 2 MG: 1 INJECTION INTRAMUSCULAR; INTRAVENOUS at 13:19

## 2018-07-04 RX ADMIN — ATORVASTATIN CALCIUM 80 MG: 80 TABLET, FILM COATED ORAL at 20:10

## 2018-07-04 RX ADMIN — VITAMIN B12 0.1 MG ORAL TABLET 100 MCG: 0.1 TABLET ORAL at 04:37

## 2018-07-04 RX ADMIN — FENTANYL CITRATE 50 MCG: 50 INJECTION, SOLUTION INTRAMUSCULAR; INTRAVENOUS at 13:55

## 2018-07-04 RX ADMIN — SODIUM CHLORIDE: 9 INJECTION, SOLUTION INTRAVENOUS at 09:27

## 2018-07-04 RX ADMIN — LOSARTAN POTASSIUM 100 MG: 50 TABLET ORAL at 04:36

## 2018-07-04 RX ADMIN — STANDARDIZED SENNA CONCENTRATE AND DOCUSATE SODIUM 2 TABLET: 8.6; 5 TABLET, FILM COATED ORAL at 04:36

## 2018-07-04 RX ADMIN — MIDAZOLAM 2 MG: 1 INJECTION INTRAMUSCULAR; INTRAVENOUS at 13:37

## 2018-07-04 RX ADMIN — CLOPIDOGREL 75 MG: 75 TABLET, FILM COATED ORAL at 04:37

## 2018-07-04 RX ADMIN — AMLODIPINE BESYLATE 5 MG: 5 TABLET ORAL at 04:37

## 2018-07-04 RX ADMIN — LABETALOL HYDROCHLORIDE 10 MG: 5 INJECTION, SOLUTION INTRAVENOUS at 17:20

## 2018-07-04 RX ADMIN — CLONIDINE HYDROCHLORIDE 0.1 MG: 0.1 TABLET ORAL at 17:54

## 2018-07-04 RX ADMIN — FENTANYL CITRATE 50 MCG: 50 INJECTION, SOLUTION INTRAMUSCULAR; INTRAVENOUS at 13:23

## 2018-07-04 RX ADMIN — ASPIRIN 81 MG: 81 TABLET, COATED ORAL at 04:37

## 2018-07-04 RX ADMIN — HYDROCHLOROTHIAZIDE 12.5 MG: 25 TABLET ORAL at 17:23

## 2018-07-04 RX ADMIN — ENALAPRILAT 1.25 MG: 1.25 INJECTION INTRAVENOUS at 15:35

## 2018-07-04 RX ADMIN — IOHEXOL 80 ML: 300 INJECTION, SOLUTION INTRAVENOUS at 14:34

## 2018-07-04 RX ADMIN — POTASSIUM CHLORIDE 20 MEQ: 1500 TABLET, EXTENDED RELEASE ORAL at 09:11

## 2018-07-04 ASSESSMENT — ENCOUNTER SYMPTOMS
PALPITATIONS: 0
DOUBLE VISION: 0
FOCAL WEAKNESS: 0
TREMORS: 0
HEADACHES: 0
TINGLING: 0
SPEECH CHANGE: 0
CHILLS: 0
FEVER: 0
BLURRED VISION: 0
DIZZINESS: 0
SENSORY CHANGE: 0

## 2018-07-04 ASSESSMENT — COGNITIVE AND FUNCTIONAL STATUS - GENERAL
SUGGESTED CMS G CODE MODIFIER MOBILITY: CH
MOBILITY SCORE: 24

## 2018-07-04 ASSESSMENT — PAIN SCALES - GENERAL
PAINLEVEL_OUTOF10: 0

## 2018-07-04 ASSESSMENT — GAIT ASSESSMENTS
GAIT LEVEL OF ASSIST: SUPERVISED
DISTANCE (FEET): 550

## 2018-07-04 NOTE — PROGRESS NOTES
IR RN note:    Site Marked and Confirmed with MD, patient and RN pre procedure   Carotid cerebral angiogram with possible interventions and stent placement  by MD Burrows assisted by RT Aceves,Right groin femoral artery access site;  Angio completed, no interventions   End Tidal CO2 range 26-33 during procedure   Right groin sealed with Angio Seal   Terumo Angio-Seal VIP Vascular closure device 6 Fr REF# [id number] LOT# [id number]  RLE +1 pulses pre procedure   RLE +1 pulses post procedure   Patient tolerated procedure, hemodynamically stable; pt awake and talking post procedure; report given to RN Lucila x [phone] ;   patient transported back to room via IR RN monitored then transferred care to report RN

## 2018-07-04 NOTE — PROGRESS NOTES
Pt returned to unit via bed with IR RN. Post-op VS started. VSS. Right groin site soft, no bleeding/bruising noted, dressing C/D/I. Education provided to pt to lay flat x4 hours, keeping right leg still. He verbalized understanding. Bed in low/locked position, treaded socks on pt, call bell within reach. Continuing to monitor.

## 2018-07-04 NOTE — PROGRESS NOTES
Pt sleeping, woken up for neuro assessment. Pt without complaints at this time. Resting comfortably.

## 2018-07-04 NOTE — RESPIRATORY CARE
COPD EDUCATION by COPD CLINICAL EDUCATOR  7/4/2018 at 7:24 AM by Briseida Christine     Patient reviewed by COPD education team. Patient does not qualify for COPD program.

## 2018-07-04 NOTE — CARE PLAN
Problem: Safety  Goal: Will remain free from falls  Outcome: PROGRESSING AS EXPECTED  Safety precautions and fall prevention interventions in place. Pt verbalizes understanding of fall prevention education. Bed in low/locked position, treaded socks on pt, call bell within reach. Encouraged to call with needs.     Problem: Knowledge Deficit  Goal: Knowledge of disease process/condition, treatment plan, diagnostic tests, and medications will improve  Outcome: PROGRESSING AS EXPECTED  Updated pt on POC including medications, tests, treatment and procedures. Questions answered as needed, pt verbalizes understanding. Encouraged to voice further questions/concerns.

## 2018-07-04 NOTE — OR SURGEON
Immediate Post- Operative Note        PostOp Diagnosis:Possible carotid stenosis      Procedure(s):No evidence significant extra or intracranial  Carotid stenosis    No interventional was performed.      Estimated Blood Loss: Less than 5 ml        Complications: None            7/4/2018     2:12 PM     Irwin Burrows

## 2018-07-04 NOTE — PROGRESS NOTES
"MD called, asked for pt BP to be rechecked, /94. Order for Labetalol 10mg IV x1 received. Given per order. Will recheck.     Noted that pt is very anxious about when the IR procedure is going to be completed, have updated as often as possible. Notified by MD that procedure cannot be completed until pt BP is less than 160. Pt states \"my blood pressure is up because I'm so worried about when this is going to be.\" Support provided to pt as needed. Continuing to monitor.     BP recheck 168/84.  "

## 2018-07-04 NOTE — PROGRESS NOTES
Notified by IR that pt will not be having procedure done today. Rescheduled for tomorrow. Pt can have dinner, NPO after midnight.

## 2018-07-04 NOTE — PROGRESS NOTES
Bedside report received. Discussed pt hx, current status and POC. Pt awake, alert, resting in bed. Unlabored breathing on room air. Bed in low/locked position, treaded socks on pt. Call bell within reach. Will continue to monitor.

## 2018-07-04 NOTE — THERAPY
Note from this AM- hold swallow eval r/t pt NPO for procedure and with elevated vital. SLP collaborated with LYDIA castro

## 2018-07-04 NOTE — PROGRESS NOTES
Report received from YENY Gaytan pt resting comfortably in stable condition with no s/s of distress.

## 2018-07-04 NOTE — PROGRESS NOTES
Notified by CNA of pt BP up to 192/90. [facility] paged for update/orders r/t BP. Updated Dr. Moreno of pt's BP rebounding after labetalol. Received order for Hydralazine IV.     Spoke with Lorne in IR regarding pt's procedure and concerns r/t BP. Planned on giving Hydralazine IV prior to procedure. Transport came to get pt, order for Hydralazine had not been verified by pharmacy. Called pharmacy regarding order and was told there is a shortage of Hydralazine and order could not be verified. Notified Lorne in IR of the issue and he cleared pt for transport down to IR, pt's vial of labetalol sent with chart for use as needed in IR. Pt transported via bed to IR.

## 2018-07-04 NOTE — THERAPY
"Physical Therapy Evaluation completed.   Bed Mobility:  Supine to Sit: Modified Independent  Transfers: Sit to Stand: Supervised  Gait: Level Of Assist: Supervised with No Equipment Needed       Plan of Care: Patient with no further skilled PT needs in the acute care setting at this time  Discharge Recommendations: Equipment: No Equipment Needed.    See \"Rehab Therapy-Acute\" Patient Summary Report for complete documentation.     RN notified of PT visit, cleared for PT evaluation as BP was controlled. Pt presented to PT with a hx of a R sided CVA, however, neurological symptoms have appeared to resolved. No apparent impariements noted during neurological assessment. Pt was able to demonstrate Mod I to SPV level functional mobility at this time with no AD use. Demonstrates with near functional baseline gait mechanics at this time, and reports of baseline balance. Pt is in no acute skilled PT needs at this time, encouraged to continue ambulating while in house with [facility] staff. Anticipate pt to d/c home with family support.   "

## 2018-07-05 VITALS
RESPIRATION RATE: 18 BRPM | OXYGEN SATURATION: 95 % | DIASTOLIC BLOOD PRESSURE: 76 MMHG | HEIGHT: 67 IN | SYSTOLIC BLOOD PRESSURE: 149 MMHG | WEIGHT: 171.74 LBS | BODY MASS INDEX: 26.96 KG/M2 | HEART RATE: 60 BPM | TEMPERATURE: 97.2 F

## 2018-07-05 PROBLEM — I35.0 SEVERE AORTIC STENOSIS: Status: RESOLVED | Noted: 2018-07-03 | Resolved: 2018-07-05

## 2018-07-05 PROBLEM — I63.9 CEREBROVASCULAR ACCIDENT (CVA) DUE TO EMBOLISM (HCC): Status: ACTIVE | Noted: 2018-07-03

## 2018-07-05 LAB
ANION GAP SERPL CALC-SCNC: 8 MMOL/L (ref 0–11.9)
BUN SERPL-MCNC: 13 MG/DL (ref 8–22)
CALCIUM SERPL-MCNC: 8.9 MG/DL (ref 8.5–10.5)
CHLORIDE SERPL-SCNC: 105 MMOL/L (ref 96–112)
CO2 SERPL-SCNC: 20 MMOL/L (ref 20–33)
CREAT SERPL-MCNC: 1.04 MG/DL (ref 0.5–1.4)
ERYTHROCYTE [DISTWIDTH] IN BLOOD BY AUTOMATED COUNT: 63.8 FL (ref 35.9–50)
GLUCOSE SERPL-MCNC: 92 MG/DL (ref 65–99)
HCT VFR BLD AUTO: 47.5 % (ref 42–52)
HGB BLD-MCNC: 15.2 G/DL (ref 14–18)
MCH RBC QN AUTO: 31 PG (ref 27–33)
MCHC RBC AUTO-ENTMCNC: 32 G/DL (ref 33.7–35.3)
MCV RBC AUTO: 96.7 FL (ref 81.4–97.8)
PLATELET # BLD AUTO: 290 K/UL (ref 164–446)
PMV BLD AUTO: 10.6 FL (ref 9–12.9)
POTASSIUM SERPL-SCNC: 3.9 MMOL/L (ref 3.6–5.5)
RBC # BLD AUTO: 4.91 M/UL (ref 4.7–6.1)
SODIUM SERPL-SCNC: 133 MMOL/L (ref 135–145)
WBC # BLD AUTO: 10.8 K/UL (ref 4.8–10.8)

## 2018-07-05 PROCEDURE — A9270 NON-COVERED ITEM OR SERVICE: HCPCS | Performed by: STUDENT IN AN ORGANIZED HEALTH CARE EDUCATION/TRAINING PROGRAM

## 2018-07-05 PROCEDURE — A9270 NON-COVERED ITEM OR SERVICE: HCPCS | Performed by: INTERNAL MEDICINE

## 2018-07-05 PROCEDURE — 700102 HCHG RX REV CODE 250 W/ 637 OVERRIDE(OP): Performed by: STUDENT IN AN ORGANIZED HEALTH CARE EDUCATION/TRAINING PROGRAM

## 2018-07-05 PROCEDURE — 80048 BASIC METABOLIC PNL TOTAL CA: CPT

## 2018-07-05 PROCEDURE — 99239 HOSP IP/OBS DSCHRG MGMT >30: CPT | Mod: GC | Performed by: INTERNAL MEDICINE

## 2018-07-05 PROCEDURE — 36415 COLL VENOUS BLD VENIPUNCTURE: CPT

## 2018-07-05 PROCEDURE — 85027 COMPLETE CBC AUTOMATED: CPT

## 2018-07-05 PROCEDURE — 700102 HCHG RX REV CODE 250 W/ 637 OVERRIDE(OP): Performed by: INTERNAL MEDICINE

## 2018-07-05 RX ORDER — LOSARTAN POTASSIUM 100 MG/1
100 TABLET ORAL DAILY
Qty: 30 TAB | Refills: 0 | Status: SHIPPED | OUTPATIENT
Start: 2018-07-06 | End: 2020-09-07

## 2018-07-05 RX ORDER — AMLODIPINE BESYLATE 5 MG/1
5 TABLET ORAL DAILY
Qty: 30 TAB | Refills: 0 | Status: SHIPPED | OUTPATIENT
Start: 2018-07-06 | End: 2018-07-05

## 2018-07-05 RX ORDER — AMLODIPINE BESYLATE 10 MG/1
10 TABLET ORAL DAILY
Qty: 30 TAB | Refills: 0 | Status: SHIPPED | OUTPATIENT
Start: 2018-07-06 | End: 2020-09-07

## 2018-07-05 RX ORDER — TAMSULOSIN HYDROCHLORIDE 0.4 MG/1
0.4 CAPSULE ORAL
Status: DISCONTINUED | OUTPATIENT
Start: 2018-07-05 | End: 2018-07-05 | Stop reason: HOSPADM

## 2018-07-05 RX ORDER — HYDROCHLOROTHIAZIDE 12.5 MG/1
12.5 TABLET ORAL DAILY
Qty: 30 TAB | Refills: 0 | Status: SHIPPED | OUTPATIENT
Start: 2018-07-06 | End: 2020-09-07

## 2018-07-05 RX ORDER — AMLODIPINE BESYLATE 10 MG/1
5 TABLET ORAL DAILY
Qty: 30 TAB | Refills: 0 | Status: SHIPPED | OUTPATIENT
Start: 2018-07-06 | End: 2018-07-05

## 2018-07-05 RX ORDER — AMLODIPINE BESYLATE 5 MG/1
5 TABLET ORAL ONCE
Status: COMPLETED | OUTPATIENT
Start: 2018-07-05 | End: 2018-07-05

## 2018-07-05 RX ORDER — AMLODIPINE BESYLATE 10 MG/1
10 TABLET ORAL DAILY
Status: DISCONTINUED | OUTPATIENT
Start: 2018-07-06 | End: 2018-07-05 | Stop reason: HOSPADM

## 2018-07-05 RX ORDER — AMLODIPINE BESYLATE 10 MG/1
10 TABLET ORAL DAILY
Status: DISCONTINUED | OUTPATIENT
Start: 2018-07-05 | End: 2018-07-05

## 2018-07-05 RX ADMIN — ASPIRIN 81 MG: 81 TABLET, COATED ORAL at 05:05

## 2018-07-05 RX ADMIN — TAMSULOSIN HYDROCHLORIDE 0.4 MG: 0.4 CAPSULE ORAL at 09:30

## 2018-07-05 RX ADMIN — VITAMIN B12 0.1 MG ORAL TABLET 100 MCG: 0.1 TABLET ORAL at 05:06

## 2018-07-05 RX ADMIN — HYDROCHLOROTHIAZIDE 12.5 MG: 25 TABLET ORAL at 05:04

## 2018-07-05 RX ADMIN — AMLODIPINE BESYLATE 5 MG: 5 TABLET ORAL at 05:05

## 2018-07-05 RX ADMIN — CLONIDINE HYDROCHLORIDE 0.2 MG: 0.1 TABLET ORAL at 05:47

## 2018-07-05 RX ADMIN — VITAMIN D, TAB 1000IU (100/BT) 1000 UNITS: 25 TAB at 05:05

## 2018-07-05 RX ADMIN — LOSARTAN POTASSIUM 100 MG: 50 TABLET ORAL at 05:04

## 2018-07-05 RX ADMIN — CLOPIDOGREL 75 MG: 75 TABLET, FILM COATED ORAL at 05:06

## 2018-07-05 RX ADMIN — AMLODIPINE BESYLATE 5 MG: 5 TABLET ORAL at 06:20

## 2018-07-05 RX ADMIN — STANDARDIZED SENNA CONCENTRATE AND DOCUSATE SODIUM 2 TABLET: 8.6; 5 TABLET, FILM COATED ORAL at 05:06

## 2018-07-05 ASSESSMENT — ENCOUNTER SYMPTOMS
SPEECH CHANGE: 0
TREMORS: 0
CHILLS: 0
NAUSEA: 0
SENSORY CHANGE: 0
DIZZINESS: 0
TINGLING: 0
FEVER: 1
FOCAL WEAKNESS: 0
PALPITATIONS: 0
HEADACHES: 0

## 2018-07-05 ASSESSMENT — PAIN SCALES - GENERAL
PAINLEVEL_OUTOF10: 0

## 2018-07-05 NOTE — PROGRESS NOTES
Since pt has been back from IR procedure, BP continues to be elevated. Given PRN enalapril per MAR. SBP remained in 170s.    At 1645 check, scant blood noted on right groin dressing and soft hematoma noted below dressing. Manual pressure held for 5 minutes then 10 lb sand bag placed. Next BP noted to be 194/102. MD paged to update of continued elevated BP. Another dose of PRN labetalol given per MAR. MD at bedside to evaluate pt, expecting new orders at this time. This RN at pt's bedside for closer monitoring.

## 2018-07-05 NOTE — PROGRESS NOTES
Discharge instructions given and discussed, signed copy in chart. Pt A&Ox 4. Pt verbalized understanding of discharge teaching using teach back method. All questions answered. 3 prescriptions given to patient. Pt discharged in stable condition, escorted by staff. Personal belongings with patient. IV removed. Tele box removed. Monitor room notified of pt's discharge.

## 2018-07-05 NOTE — PROGRESS NOTES
Patient agitated he was expecting to go home.   Denies pain.  Call bell in reach. All needs addressed at this time.  Will continue to monitor.

## 2018-07-05 NOTE — PROGRESS NOTES
Internal Medicine Interval Note  Note Author: Aure Moreno M.D.     Name Bg Andrea 1940   Age/Sex 77 y.o. male   MRN [id number]   Code Status Full     After 5PM or if no immediate response to page, please call for cross-coverage  Attending/Team: Dr Zamudio/WALLY Cabrera See Patient List for primary contact information  Call [phone] to page    1st Call - Day Intern (R1):   Dr Moreno 2nd Call - Day Sr. Resident (R2/R3):   Dr Trimble         Reason for interval visit  (Principal Problem)   CVA      Interval Problem Daily Status Update  (24 hours, problem oriented, brief subjective history, new lab/imaging data pertinent to that problem)   Patient's BP was difficult to control yesterday evening and had to use multiple PRNs. His BP was up to the 190's/100's but today he has only received one dose of clonidine at 0545. Since this time his BP has slowly increased and is now stable at 149/76. Yesterday he was developing a hematoma in his right groin but this has gone down in size since yesterday. Pt denies any headaches and dizziness. He is anxious to get home.   Review of Systems   Constitutional: Positive for fever. Negative for chills.   Cardiovascular: Negative for chest pain and palpitations.   Gastrointestinal: Negative for nausea.   Neurological: Negative for dizziness, tingling, tremors, sensory change, speech change, focal weakness and headaches.       Disposition/Barriers to discharge:   Patient will be discharged home today.     Consultants/Specialty  [facility] - Dr. Burrows  PCP: @PCP      Quality Measures  Quality-Core Measures   Reviewed items::  EKG reviewed, Labs reviewed, Medications reviewed and Radiology images reviewed  Matt catheter::  No Matt          Physical Exam       Vitals:    18 0623 18 0745 18 0933 18 1139   BP: 151/77 101/56 129/69 138/70   Pulse:  60 70 60   Resp:  18  18   Temp:  36.4 °C (97.5 °F)  36.2 °C (97.2 °F)   SpO2:  95%  95%   Weight:        Height:         Body mass index is 26.9 kg/m².    Oxygen Therapy:  Pulse Oximetry: 95 %, O2 (LPM): 0, O2 Delivery: None (Room Air)    Physical Exam  Constitutional: He is oriented to person, place, and time and well-developed, well-nourished, and in no distress.   HENT:   Head: Normocephalic and atraumatic.   Eyes: Pupils are equal, round, and reactive to light.   Cardiovascular: Normal rate and regular rhythm.    Murmur heard.  Neurological: He is alert and oriented to person, place, and time. He has normal sensation, normal strength and intact cranial nerves. He is not disoriented. He displays no weakness, no tremor and facial symmetry. He has a normal Cerebellar Exam. He shows no pronator drift. GCS score is 15.   Skin: Skin is warm and dry.   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 Did not receive tPA, was on Plavix/Aspirin when arrived. Clinically has completely recovered and had no complaints.   CTA 7/4/18 did not show any significant carotid stenosis  Patient was monitored on tele for 48 hours and no events of a fib  PT and speech saw the patient and there is no need at this point     Plan:  Continue Plavix 75/Aspirin 81 dual antiplatelet therapy  Continue atorvastatin 80 mg qd   [facility] There was no significant stenosis of the carotids. Pt should be evaluated to rule out a fib as a cause with 30 day rhythm monitoring           HTN (hypertension)   Assessment & Plan    -Pt had high BP up into the 190/100's throughout his hospital stay that was difficult to control, needed multiple prn meds including enalapril, labetolol, and clonidine  -increased home meds to losartan 100 mg qd, amlodipine 10 mg qd, and HCTZ 12.5  qd  -[facility]             COPD (chronic obstructive pulmonary disease) (HCC)- (present on admission)   Assessment & Plan    History of COPD on albuterol/ipratroprium            Severe aortic stenosis- (present on admission)   Assessment & Plan    [facility]  [facility] reviewed and no significant findings

## 2018-07-05 NOTE — PROGRESS NOTES
Report received at bedside.  RN assumed care.  Chart reviewed.  Patient resting in bed. Updated plan of care with patient and physician. Patient verified on telemetry.  Bed alarm on.  Call light in reach.  Bed in lowest position.  Non-slip socks on.  Patient alert and oriented x4.  Pain addressed.

## 2018-07-05 NOTE — PROGRESS NOTES
Upon straight cathing the pt, the pt did not tolerate the procedure well and there was no urine output. MD Dr. Vieyra paged regarding the results of the straight cath and the new patients bladder scan results of 388 ml. MD stated it was okay to hold off til end of shift to try and do another straight cath on pt. MD gave orders to order another bladder scan once RN decides to straight cath him.

## 2018-07-05 NOTE — CARE PLAN
Problem: Safety  Goal: Will remain free from injury  Outcome: PROGRESSING AS EXPECTED  Provide assistance with mobility    Problem: Pain Management  Goal: Pain level will decrease to patient's comfort goal  Patient will remain free of pain at groin site.

## 2018-07-05 NOTE — PROGRESS NOTES
MD Dr. Vieyra paged regarding the pt's pauses during the day of 1.4-1.8 seconds. MD stated to notify for pauses over 3 seconds or if pt is symptomatic. MD notified of the pt's high BP's during the day as well as the pt receiving beta blockers. MD also given information of the pt's recent bladder scan of 592ml. MD to put in orders to straight cath.

## 2018-07-05 NOTE — CARE PLAN
Problem: Knowledge Deficit  Goal: Knowledge of disease process/condition, treatment plan, diagnostic tests, and medications will improve  Outcome: PROGRESSING AS EXPECTED  Patient is educated of disease process and condition. Treatment team has included patient in plan of care. All medications indications and side effects are explained. Patient is encouraged to ask questions. Patient indicates understanding.    Problem: Pain Management  Goal: Pain level will decrease to patient's comfort goal  Outcome: PROGRESSING AS EXPECTED  Educated patient about 1-10 pain scale, regular pain assessments, and available pharmaciological and non-pharmacological pain relief. Patient expressed understanding and had no further questions at this time.

## 2018-07-05 NOTE — PROGRESS NOTES
Dr. Moreno paged regarding the new order of 10mg PO of amlodipine daily. RN already gave 5 mg PO of amlodipine this AM. New orders for a one time dose of amlodipine of 5 mg PO and to cancel 9am 10 mg PO amlodipine dose.     MD also aware that the pt had peed out 400 ml and agreed to not do another straight cath or bladder scan.

## 2018-07-05 NOTE — PROGRESS NOTES
Monitor Summary     SB 57-SR 78  .16/.10/.44    Notified by monitor tech at approx 1945 that pt has been having sinus arrests 1.4-1.8 sec since 1300 today. Was not previously notified during AM shift. Pt has been asymptomatic. Full monitor summary has been reported to night RN assuming care of pt.

## 2018-07-05 NOTE — PROGRESS NOTES
Bedside report completed. Pt lying in bed comfortably. A/O x 4, pain 0/10. Safety precautions in place. Call light and personal belongings within reach. Pt educated on POC and all questions answered at this time.  Educated patient on calling for assistance when needed. All pt needs are met at this time.  Will continue to monitor.

## 2018-07-05 NOTE — DISCHARGE INSTRUCTIONS
Discharge Instructions    Discharged to home by car with friend. Discharged via wheelchair, hospital escort: Yes.  Special equipment needed: Not Applicable    Be sure to schedule a follow-up appointment with your primary care doctor or any specialists as instructed.     Discharge Plan:   Smoking Cessation Offered: Patient Refused  Pneumococcal Vaccine Administered/Refused: Not given - Patient refused pneumococcal vaccine  Influenza Vaccine Indication: Patient Refuses, Indicated: 65 years and older    I understand that a diet low in cholesterol, fat, and sodium is recommended for good health. Unless I have been given specific instructions below for another diet, I accept this instruction as my diet prescription.   Other diet: cardiac    Special Instructions: None    · Is patient discharged on Warfarin / Coumadin?   No     Depression / Suicide Risk    As you are discharged from this [facility] facility, it is important to learn how to keep safe from harming yourself.    Recognize the warning signs:  · Abrupt changes in personality, positive or negative- including increase in energy   · Giving away possessions  · Change in eating patterns- significant weight changes-  positive or negative  · Change in sleeping patterns- unable to sleep or sleeping all the time   · Unwillingness or inability to communicate  · Depression  · Unusual sadness, discouragement and loneliness  · Talk of wanting to die  · Neglect of personal appearance   · Rebelliousness- reckless behavior  · Withdrawal from people/activities they love  · Confusion- inability to concentrate     If you or a loved one observes any of these behaviors or has concerns about self-harm, here's what you can do:  · Talk about it- your feelings and reasons for harming yourself  · Remove any means that you might use to hurt yourself (examples: pills, rope, extension cords, firearm)  · Get professional help from the community (Mental Health, Substance Abuse,  psychological counseling)  · Do not be alone:Call your Safe Contact- someone whom you trust who will be there for you.  · Call your local CRISIS HOTLINE 783-7372 or 127-716-2996  · Call your local Children's Mobile Crisis Response Team [place] (343) 061-8330 or www.Greatist  · Call the toll free National Suicide Prevention Hotlines   · National Suicide Prevention Lifeline 798-048-JSZY (2630)  · National Hope Line Network 800-SUICIDE (935-9710)

## 2018-07-06 NOTE — DISCHARGE SUMMARY
Internal Medicine Discharge Summary  Note Author: Diaz Trimble M.D.       Name Bg Andrea 1940   Age/Sex 77 y.o. male   MRN [id number]         Admit Date:  2018       Discharge Date:   2018    Service:   [facility] Internal Medicine Red Team  Attending Physician(s):   Dr. Zamudio       Senior Resident(s):   Dr. Trimble  [place]ola Resident(s):   Dr. Moreno  PCP: No primary care provider on file.      Primary Diagnosis:   Cerebrovascular accident    Secondary Diagnoses:                Active Problems:    Cerebrovascular accident (CVA) due to embolism (HCC) POA: Yes    COPD (chronic obstructive pulmonary disease) (HCC) POA: Yes    HTN (hypertension) POA: Unknown  Resolved Problems:    Severe aortic stenosis POA: Yes      Hospital Summary (Brief Narrative):       Mr. Andrea is a pleasant 77 year old male with past medical history significant for hypertension, COPD, recent cerebrovascular accident [facility] concern over critical stenosis of distal internal carotid artery after recent cerebrovascular accident of possible embolic origin.    [facility] left sided arm weakness. MRI was consistent with embolic stroke on the left side. [facility] which did not reveal cardioembolic source. During evaluation, CTA of the head and neck however was concerning for distal right ICA stenosis and hence patient was transferred her to [facility].    Interventional Radiology was consulted and patient underwent IR - Carotid angiogram and they did not find any evidence of stenosis.     The patient's blood pressure was somewhat uncontrollable in this hospitalization. The dose of his blood pressure medications were adjusted and hydrochlorothiazide was added to patient's medications. Patient expressed understanding regarding this and he is going [facility].    Although, patient had embolic CVA, the source of emboli is unknown  and on discharge it is recommended for him to get a 30 day Holter monitor to rule out atrial fibrillation. He did not have any evidence in this hospitalization.    THINGS TO FOLLOW IN POST DC CLINIC:  1) Ensure adequate blood pressure control.  2) HCTZ was started, recheck BMP to ensure electrolytes within range.  3) Follow up regarding 30 day Holter monitor to detect embolic of unknown origin.      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 Did not receive tPA, was on Plavix/Aspirin when arrived. Clinically has completely recovered and had no complaints.   CTA 7/4/18 did not show any significant carotid stenosis  Patient was monitored on tele for 48 hours and no events of a fib  PT and speech saw the patient and there is no need at this point   Continue Aspirin 81mg daily. He was aspirin naive when he had the stroke.  Plavix was only started in anticipation of carotid stent and when there was no need, was discontinued on discharge.  Continue atorvastatin 80 mg qd   [facility]. There was no significant stenosis of the carotids. Pt should be evaluated to rule out a fib as a cause with 30 day rhythm monitoring           HTN (hypertension)   Assessment & Plan    Patient had high BP up into the 190/100's throughout his hospital stay that was difficult to control, needed multiple prn meds including enalapril, labetolol, and clonidine  Increased home meds to losartan 100 mg qd, amlodipine 10 mg qd, and HCTZ 12.5 qd  Control was somewhat finally achieved on discharge with blood pressure in 150s/80s.            COPD (chronic obstructive pulmonary disease) ([facility])   Assessment & Plan    History of COPD on albuterol/ipratroprium            Severe aortic stenosis   Assessment & Plan    [facility]  [facility] reviewed and no significant findings              Consultants:     Interventional Radiology: Dr. Burrows    Procedures:        IR Carotid angiogram: 7/4/2018    Imaging/ Testing:      IR-CAROTID-CEREBRAL BILATERAL   Final Result      1.  Mild atherosclerotic disease in the proximal right internal carotid artery without significant stenosis.   2.  Mild atherosclerotic disease in the proximal supraclinoid segment of the right internal carotid artery.   3.  There is no stenosis at the origin of the left internal carotid artery.   4.  No endovascular intervention was performed.            Discharge Medications:         Medication Reconciliation: Completed       Medication List      START taking these medications      Instructions   hydroCHLOROthiazide 12.5 MG tablet  Start taking on:  7/6/2018  Commonly known as:  HYDRODIURIL   Take 1 Tab by mouth every day.  Dose:  12.5 mg        CHANGE how you take these medications      Instructions   amLODIPine 10 MG Tabs  Start taking on:  7/6/2018  What changed:  · medication strength  · how much to take  Commonly known as:  NORVASC   Take 1 Tab by mouth every day.  Dose:  10 mg     losartan 100 MG Tabs  Start taking on:  7/6/2018  What changed:  · medication strength  · how much to take  Commonly known as:  COZAAR   Take 1 Tab by mouth every day.  Dose:  100 mg        CONTINUE taking these medications      Instructions   aspirin EC 81 MG Tbec  Commonly known as:  ECOTRIN   Take 81 mg by mouth every day.  Dose:  81 mg     atorvastatin 80 MG tablet  Commonly known as:  LIPITOR   Take 80 mg by mouth every evening.  Dose:  80 mg     cyanocobalamin 100 MCG Tabs  Commonly known as:  VITAMIN B-12   Take 100 mcg by mouth every day.  Dose:  100 mcg     tamsulosin 0.4 MG capsule  Commonly known as:  FLOMAX   Take 0.4 mg by mouth ONE-HALF HOUR AFTER BREAKFAST.  Dose:  0.4 mg     vitamin D 1000 UNIT Tabs  Commonly known as:  cholecalciferol   Take 1,000 Units by  mouth every day.  Dose:  1000 Units        STOP taking these medications    clopidogrel 75 MG Tabs  Commonly known as:  PLAVIX                Disposition:   Discharged home    Diet:   Healthy diet    Activity:   As tolerated    Instructions:      Advised follow-up.  Advised to monitor blood pressure log.   The patient was instructed to return to the ER in the event of worsening symptoms. I have counseled the patient on the importance of compliance and the patient has agreed to proceed with all medical recommendations and follow up plan indicated above.   The patient understands that all medications come with benefits and risks. Risks may include permanent injury or death and these risks can be minimized with close reassessment and monitoring.        Primary Care Provider:    No primary care provider on file.    Discharge summary faxed to primary care provider:  Completed  Copy of discharge summary given to the patient: Completed      Follow up appointment details :      [facility] at 10:30 AM.    Pending Studies:        None pending    Time spent on discharge day patient visit, preparing discharge paperwork and arranging for patient follow up.    Summary of follow up issues:   Advised follow up regarding possible holter monitor and blood pressure managment    Discharge Time (Minutes) :    38 minutes  Hospital Course Type:  Inpatient Stay >2 midnights      Condition on Discharge    ______________________________________________________________________    Interval history/exam for day of discharge:     Patient reports to feeling good, ready and anxious to go home. Blood pressure relatively well controlled prior to discharge.    Vitals:    07/05/18 0933 07/05/18 1139 07/05/18 1400 07/05/18 1407   BP: 129/69 138/70 149/76 149/76   Pulse: 70 60     Resp:  18     Temp:  36.2 °C (97.2 °F)     SpO2:  95%     Weight:       Height:         Weight/BMI: Body mass index is 26.9 kg/m².  Pulse  Oximetry: 95 %, O2 (LPM): 0, O2 Delivery: None (Room Air)    General: Appears comfortable  CVS: Regular rate and rhythm, faint systolic murmur heard  PULM: Clear to auscultation  Neuro: Strength 5/5 in both sides.    Most Recent Labs:    Lab Results   Component Value Date/Time    WBC 10.8 07/05/2018 04:36 AM    RBC 4.91 07/05/2018 04:36 AM    HEMOGLOBIN 15.2 07/05/2018 04:36 AM    HEMATOCRIT 47.5 07/05/2018 04:36 AM    MCV 96.7 07/05/2018 04:36 AM    MCH 31.0 07/05/2018 04:36 AM    MCHC 32.0 (L) 07/05/2018 04:36 AM    MPV 10.6 07/05/2018 04:36 AM    NEUTSPOLYS 58.90 07/04/2018 03:54 AM    LYMPHOCYTES 25.10 07/04/2018 03:54 AM    MONOCYTES 10.90 07/04/2018 03:54 AM    EOSINOPHILS 2.40 07/04/2018 03:54 AM    BASOPHILS 1.40 07/04/2018 03:54 AM      Lab Results   Component Value Date/Time    SODIUM 133 (L) 07/05/2018 04:36 AM    POTASSIUM 3.9 07/05/2018 04:36 AM    CHLORIDE 105 07/05/2018 04:36 AM    CO2 20 07/05/2018 04:36 AM    GLUCOSE 92 07/05/2018 04:36 AM    BUN 13 07/05/2018 04:36 AM    CREATININE 1.04 07/05/2018 04:36 AM      Lab Results   Component Value Date/Time    ALTSGPT 12 07/02/2018 08:52 PM    ASTSGOT 17 07/02/2018 08:52 PM    ALKPHOSPHAT 60 07/02/2018 08:52 PM    TBILIRUBIN 1.1 07/02/2018 08:52 PM    ALBUMIN 4.7 07/02/2018 08:52 PM    GLOBULIN 2.8 07/02/2018 08:52 PM    INR 1.11 07/04/2018 05:58 PM     Lab Results   Component Value Date/Time    PROTHROMBTM 14.0 07/04/2018 05:58 PM    INR 1.11 07/04/2018 05:58 PM

## 2018-09-14 ENCOUNTER — HOSPITAL ENCOUNTER (EMERGENCY)
Dept: HOSPITAL 8 - ED | Age: 78
LOS: 1 days | Discharge: HOME | End: 2018-09-15
Payer: OTHER GOVERNMENT

## 2018-09-14 VITALS — BODY MASS INDEX: 30.97 KG/M2 | HEIGHT: 73 IN | WEIGHT: 233.69 LBS

## 2018-09-14 DIAGNOSIS — I10: Primary | ICD-10-CM

## 2018-09-14 DIAGNOSIS — Z86.73: ICD-10-CM

## 2018-09-14 DIAGNOSIS — R53.1: ICD-10-CM

## 2018-09-14 DIAGNOSIS — F17.200: ICD-10-CM

## 2018-09-14 PROCEDURE — 80053 COMPREHEN METABOLIC PANEL: CPT

## 2018-09-14 PROCEDURE — 36415 COLL VENOUS BLD VENIPUNCTURE: CPT

## 2018-09-14 PROCEDURE — 93005 ELECTROCARDIOGRAM TRACING: CPT

## 2018-09-14 PROCEDURE — 85025 COMPLETE CBC W/AUTO DIFF WBC: CPT

## 2018-09-14 PROCEDURE — 99285 EMERGENCY DEPT VISIT HI MDM: CPT

## 2018-09-14 PROCEDURE — 80307 DRUG TEST PRSMV CHEM ANLYZR: CPT

## 2018-09-14 PROCEDURE — 82140 ASSAY OF AMMONIA: CPT

## 2018-09-14 PROCEDURE — 71045 X-RAY EXAM CHEST 1 VIEW: CPT

## 2018-09-14 PROCEDURE — 82330 ASSAY OF CALCIUM: CPT

## 2018-09-14 PROCEDURE — 70450 CT HEAD/BRAIN W/O DYE: CPT

## 2018-09-14 PROCEDURE — 84484 ASSAY OF TROPONIN QUANT: CPT

## 2018-09-14 PROCEDURE — 85610 PROTHROMBIN TIME: CPT

## 2018-09-14 PROCEDURE — 82803 BLOOD GASES ANY COMBINATION: CPT

## 2018-09-15 VITALS — DIASTOLIC BLOOD PRESSURE: 87 MMHG | SYSTOLIC BLOOD PRESSURE: 145 MMHG

## 2018-09-15 LAB
ALBUMIN SERPL-MCNC: 3.9 G/DL (ref 3.4–5)
ALP SERPL-CCNC: 57 U/L (ref 45–117)
ALT SERPL-CCNC: 17 U/L (ref 12–78)
ANION GAP SERPL CALC-SCNC: 7 MMOL/L (ref 5–15)
BASOPHILS # BLD AUTO: 0.11 X10^3/UL (ref 0–0.1)
BASOPHILS NFR BLD AUTO: 1 % (ref 0–1)
BILIRUB SERPL-MCNC: 0.5 MG/DL (ref 0.2–1)
CALCIUM SERPL-MCNC: 9.4 MG/DL (ref 8.5–10.1)
CHLORIDE SERPL-SCNC: 101 MMOL/L (ref 98–107)
CREAT SERPL-MCNC: 1.36 MG/DL (ref 0.7–1.3)
EOSINOPHIL # BLD AUTO: 0.24 X10^3/UL (ref 0–0.4)
EOSINOPHIL NFR BLD AUTO: 2 % (ref 1–7)
ERYTHROCYTE [DISTWIDTH] IN BLOOD BY AUTOMATED COUNT: 18.5 % (ref 9.4–14.8)
INR PPP: 1.05 (ref 0.93–1.1)
LYMPHOCYTES # BLD AUTO: 2.7 X10^3/UL (ref 1–3.4)
LYMPHOCYTES NFR BLD AUTO: 24 % (ref 22–44)
MCH RBC QN AUTO: 31.7 PG (ref 27.5–34.5)
MCHC RBC AUTO-ENTMCNC: 33.1 G/DL (ref 33.2–36.2)
MCV RBC AUTO: 95.8 FL (ref 81–97)
MD: NO
MONOCYTES # BLD AUTO: 0.88 X10^3/UL (ref 0.2–0.8)
MONOCYTES NFR BLD AUTO: 8 % (ref 2–9)
NEUTROPHILS # BLD AUTO: 7.38 X10^3/UL (ref 1.8–6.8)
NEUTROPHILS NFR BLD AUTO: 65 % (ref 42–75)
PH BLDV: 7.4 PH (ref 7.32–7.42)
PLATELET # BLD AUTO: 277 X10^3/UL (ref 130–400)
PMV BLD AUTO: 7.8 FL (ref 7.4–10.4)
PROT SERPL-MCNC: 7.5 G/DL (ref 6.4–8.2)
PROTHROMBIN TIME: 10.9 SECONDS (ref 9.6–11.5)
RBC # BLD AUTO: 5.39 X10^6/UL (ref 4.38–5.82)
TROPONIN I SERPL-MCNC: < 0.015 NG/ML (ref 0–0.04)

## 2018-12-26 ENCOUNTER — HOSPITAL ENCOUNTER (OUTPATIENT)
Dept: RADIOLOGY | Facility: MEDICAL CENTER | Age: 78
End: 2018-12-26

## 2018-12-26 ENCOUNTER — APPOINTMENT (OUTPATIENT)
Dept: RADIOLOGY | Facility: MEDICAL CENTER | Age: 78
DRG: 065 | End: 2018-12-26
Attending: PSYCHIATRY & NEUROLOGY
Payer: COMMERCIAL

## 2018-12-26 ENCOUNTER — HOSPITAL ENCOUNTER (INPATIENT)
Facility: MEDICAL CENTER | Age: 78
LOS: 2 days | DRG: 065 | End: 2018-12-28
Attending: INTERNAL MEDICINE | Admitting: INTERNAL MEDICINE
Payer: COMMERCIAL

## 2018-12-26 DIAGNOSIS — I63.131 CEREBROVASCULAR ACCIDENT (CVA) DUE TO EMBOLISM OF RIGHT CAROTID ARTERY (HCC): ICD-10-CM

## 2018-12-26 DIAGNOSIS — F03.90 DEMENTIA WITHOUT BEHAVIORAL DISTURBANCE, UNSPECIFIED DEMENTIA TYPE: ICD-10-CM

## 2018-12-26 PROBLEM — R71.8 ELEVATED HEMATOCRIT: Status: ACTIVE | Noted: 2018-12-26

## 2018-12-26 PROBLEM — N40.0 BPH (BENIGN PROSTATIC HYPERPLASIA): Status: ACTIVE | Noted: 2018-12-26

## 2018-12-26 PROBLEM — E87.1 HYPONATREMIA: Status: ACTIVE | Noted: 2018-12-26

## 2018-12-26 PROBLEM — R73.9 HYPERGLYCEMIA: Status: ACTIVE | Noted: 2018-12-26

## 2018-12-26 PROBLEM — D72.829 LEUKOCYTOSIS: Status: ACTIVE | Noted: 2018-12-26

## 2018-12-26 LAB
ALBUMIN SERPL BCP-MCNC: 4.4 G/DL (ref 3.2–4.9)
ALBUMIN/GLOB SERPL: 1.4 G/DL
ALP SERPL-CCNC: 58 U/L (ref 30–99)
ALT SERPL-CCNC: 11 U/L (ref 2–50)
ANION GAP SERPL CALC-SCNC: 7 MMOL/L (ref 0–11.9)
AST SERPL-CCNC: 18 U/L (ref 12–45)
BASOPHILS # BLD AUTO: 0.4 % (ref 0–1.8)
BASOPHILS # BLD: 0.05 K/UL (ref 0–0.12)
BILIRUB SERPL-MCNC: 1.6 MG/DL (ref 0.1–1.5)
BUN SERPL-MCNC: 20 MG/DL (ref 8–22)
CALCIUM SERPL-MCNC: 9.6 MG/DL (ref 8.5–10.5)
CHLORIDE SERPL-SCNC: 100 MMOL/L (ref 96–112)
CHOLEST SERPL-MCNC: 150 MG/DL (ref 100–199)
CO2 SERPL-SCNC: 23 MMOL/L (ref 20–33)
CREAT SERPL-MCNC: 1.34 MG/DL (ref 0.5–1.4)
EOSINOPHIL # BLD AUTO: 0.03 K/UL (ref 0–0.51)
EOSINOPHIL NFR BLD: 0.3 % (ref 0–6.9)
ERYTHROCYTE [DISTWIDTH] IN BLOOD BY AUTOMATED COUNT: 61.4 FL (ref 35.9–50)
ETHANOL BLD-MCNC: 0 G/DL
GLOBULIN SER CALC-MCNC: 3.1 G/DL (ref 1.9–3.5)
GLUCOSE BLD-MCNC: 135 MG/DL (ref 65–99)
GLUCOSE SERPL-MCNC: 120 MG/DL (ref 65–99)
HCT VFR BLD AUTO: 55.9 % (ref 42–52)
HDLC SERPL-MCNC: 39 MG/DL
HGB BLD-MCNC: 19.3 G/DL (ref 14–18)
IMM GRANULOCYTES # BLD AUTO: 0.16 K/UL (ref 0–0.11)
IMM GRANULOCYTES NFR BLD AUTO: 1.4 % (ref 0–0.9)
LDLC SERPL CALC-MCNC: 86 MG/DL
LYMPHOCYTES # BLD AUTO: 0.28 K/UL (ref 1–4.8)
LYMPHOCYTES NFR BLD: 2.5 % (ref 22–41)
MAGNESIUM SERPL-MCNC: 2 MG/DL (ref 1.5–2.5)
MCH RBC QN AUTO: 33.2 PG (ref 27–33)
MCHC RBC AUTO-ENTMCNC: 34.5 G/DL (ref 33.7–35.3)
MCV RBC AUTO: 96 FL (ref 81.4–97.8)
MONOCYTES # BLD AUTO: 0.48 K/UL (ref 0–0.85)
MONOCYTES NFR BLD AUTO: 4.3 % (ref 0–13.4)
NEUTROPHILS # BLD AUTO: 10.13 K/UL (ref 1.82–7.42)
NEUTROPHILS NFR BLD: 91.1 % (ref 44–72)
NRBC # BLD AUTO: 0 K/UL
NRBC BLD-RTO: 0 /100 WBC
PLATELET # BLD AUTO: 209 K/UL (ref 164–446)
PMV BLD AUTO: 10.3 FL (ref 9–12.9)
POTASSIUM SERPL-SCNC: 4.3 MMOL/L (ref 3.6–5.5)
PROT SERPL-MCNC: 7.5 G/DL (ref 6–8.2)
RBC # BLD AUTO: 5.82 M/UL (ref 4.7–6.1)
SODIUM SERPL-SCNC: 130 MMOL/L (ref 135–145)
TRIGL SERPL-MCNC: 123 MG/DL (ref 0–149)
WBC # BLD AUTO: 11.1 K/UL (ref 4.8–10.8)

## 2018-12-26 PROCEDURE — 83735 ASSAY OF MAGNESIUM: CPT

## 2018-12-26 PROCEDURE — 770020 HCHG ROOM/CARE - TELE (206)

## 2018-12-26 PROCEDURE — 80053 COMPREHEN METABOLIC PANEL: CPT

## 2018-12-26 PROCEDURE — 93005 ELECTROCARDIOGRAM TRACING: CPT | Performed by: STUDENT IN AN ORGANIZED HEALTH CARE EDUCATION/TRAINING PROGRAM

## 2018-12-26 PROCEDURE — 70496 CT ANGIOGRAPHY HEAD: CPT

## 2018-12-26 PROCEDURE — 83036 HEMOGLOBIN GLYCOSYLATED A1C: CPT

## 2018-12-26 PROCEDURE — 70498 CT ANGIOGRAPHY NECK: CPT

## 2018-12-26 PROCEDURE — 80061 LIPID PANEL: CPT

## 2018-12-26 PROCEDURE — 85025 COMPLETE CBC W/AUTO DIFF WBC: CPT

## 2018-12-26 PROCEDURE — 82962 GLUCOSE BLOOD TEST: CPT

## 2018-12-26 PROCEDURE — 700105 HCHG RX REV CODE 258: Performed by: STUDENT IN AN ORGANIZED HEALTH CARE EDUCATION/TRAINING PROGRAM

## 2018-12-26 PROCEDURE — 700117 HCHG RX CONTRAST REV CODE 255: Performed by: PSYCHIATRY & NEUROLOGY

## 2018-12-26 PROCEDURE — 36415 COLL VENOUS BLD VENIPUNCTURE: CPT

## 2018-12-26 PROCEDURE — 80307 DRUG TEST PRSMV CHEM ANLYZR: CPT

## 2018-12-26 RX ORDER — ATORVASTATIN CALCIUM 40 MG/1
40 TABLET, FILM COATED ORAL EVERY EVENING
Status: DISCONTINUED | OUTPATIENT
Start: 2018-12-26 | End: 2018-12-27

## 2018-12-26 RX ORDER — ONDANSETRON 2 MG/ML
4 INJECTION INTRAMUSCULAR; INTRAVENOUS EVERY 4 HOURS PRN
Status: DISCONTINUED | OUTPATIENT
Start: 2018-12-26 | End: 2018-12-28 | Stop reason: HOSPADM

## 2018-12-26 RX ORDER — ONDANSETRON 4 MG/1
4 TABLET, ORALLY DISINTEGRATING ORAL EVERY 4 HOURS PRN
Status: DISCONTINUED | OUTPATIENT
Start: 2018-12-26 | End: 2018-12-28 | Stop reason: HOSPADM

## 2018-12-26 RX ORDER — LORAZEPAM 2 MG/ML
2 INJECTION INTRAMUSCULAR
Status: DISCONTINUED | OUTPATIENT
Start: 2018-12-26 | End: 2018-12-28 | Stop reason: HOSPADM

## 2018-12-26 RX ORDER — CLOPIDOGREL BISULFATE 75 MG/1
75 TABLET ORAL ONCE
Status: DISPENSED | OUTPATIENT
Start: 2018-12-26 | End: 2018-12-27

## 2018-12-26 RX ORDER — BISACODYL 10 MG
10 SUPPOSITORY, RECTAL RECTAL
Status: DISCONTINUED | OUTPATIENT
Start: 2018-12-26 | End: 2018-12-28 | Stop reason: HOSPADM

## 2018-12-26 RX ORDER — CLOPIDOGREL BISULFATE 75 MG/1
75 TABLET ORAL DAILY
Status: DISCONTINUED | OUTPATIENT
Start: 2018-12-27 | End: 2018-12-28 | Stop reason: HOSPADM

## 2018-12-26 RX ORDER — ATORVASTATIN CALCIUM 40 MG/1
40 TABLET, FILM COATED ORAL ONCE
Status: DISCONTINUED | OUTPATIENT
Start: 2018-12-26 | End: 2018-12-26

## 2018-12-26 RX ORDER — ATORVASTATIN CALCIUM 80 MG/1
80 TABLET, FILM COATED ORAL EVERY EVENING
Status: DISCONTINUED | OUTPATIENT
Start: 2018-12-27 | End: 2018-12-26

## 2018-12-26 RX ORDER — ASPIRIN 325 MG
162 TABLET ORAL DAILY
Status: DISCONTINUED | OUTPATIENT
Start: 2018-12-27 | End: 2018-12-26

## 2018-12-26 RX ORDER — SODIUM CHLORIDE 9 MG/ML
INJECTION, SOLUTION INTRAVENOUS CONTINUOUS
Status: DISCONTINUED | OUTPATIENT
Start: 2018-12-26 | End: 2018-12-28 | Stop reason: HOSPADM

## 2018-12-26 RX ORDER — ACETAMINOPHEN 650 MG/1
650 SUPPOSITORY RECTAL EVERY 4 HOURS PRN
Status: DISCONTINUED | OUTPATIENT
Start: 2018-12-26 | End: 2018-12-28 | Stop reason: HOSPADM

## 2018-12-26 RX ORDER — POLYETHYLENE GLYCOL 3350 17 G/17G
1 POWDER, FOR SOLUTION ORAL
Status: DISCONTINUED | OUTPATIENT
Start: 2018-12-26 | End: 2018-12-28 | Stop reason: HOSPADM

## 2018-12-26 RX ORDER — ACETAMINOPHEN 325 MG/1
650 TABLET ORAL EVERY 4 HOURS PRN
Status: DISCONTINUED | OUTPATIENT
Start: 2018-12-26 | End: 2018-12-28 | Stop reason: HOSPADM

## 2018-12-26 RX ORDER — AMOXICILLIN 250 MG
2 CAPSULE ORAL 2 TIMES DAILY
Status: DISCONTINUED | OUTPATIENT
Start: 2018-12-26 | End: 2018-12-28 | Stop reason: HOSPADM

## 2018-12-26 RX ADMIN — SODIUM CHLORIDE: 9 INJECTION, SOLUTION INTRAVENOUS at 19:51

## 2018-12-26 RX ADMIN — IOHEXOL 100 ML: 350 INJECTION, SOLUTION INTRAVENOUS at 23:45

## 2018-12-26 ASSESSMENT — LIFESTYLE VARIABLES: EVER_SMOKED: YES

## 2018-12-26 ASSESSMENT — COGNITIVE AND FUNCTIONAL STATUS - GENERAL
STANDING UP FROM CHAIR USING ARMS: A LITTLE
MOVING TO AND FROM BED TO CHAIR: A LITTLE
SUGGESTED CMS G CODE MODIFIER MOBILITY: CK
HELP NEEDED FOR BATHING: A LITTLE
PERSONAL GROOMING: A LITTLE
TOILETING: A LITTLE
MOBILITY SCORE: 18
WALKING IN HOSPITAL ROOM: A LITTLE
TURNING FROM BACK TO SIDE WHILE IN FLAT BAD: A LITTLE
CLIMB 3 TO 5 STEPS WITH RAILING: A LITTLE
DRESSING REGULAR UPPER BODY CLOTHING: A LITTLE
DRESSING REGULAR LOWER BODY CLOTHING: A LITTLE
SUGGESTED CMS G CODE MODIFIER DAILY ACTIVITY: CK
MOVING FROM LYING ON BACK TO SITTING ON SIDE OF FLAT BED: A LITTLE
DAILY ACTIVITIY SCORE: 19

## 2018-12-26 ASSESSMENT — ENCOUNTER SYMPTOMS
SORE THROAT: 0
SPEECH CHANGE: 1
ORTHOPNEA: 0
VOMITING: 0
TREMORS: 0
FALLS: 0
FOCAL WEAKNESS: 1
PND: 0
ABDOMINAL PAIN: 0
LOSS OF CONSCIOUSNESS: 0
BACK PAIN: 1
DIAPHORESIS: 0
WEIGHT LOSS: 0
PSYCHIATRIC NEGATIVE: 1
HEARTBURN: 0
SHORTNESS OF BREATH: 0
NAUSEA: 0
FEVER: 0
PHOTOPHOBIA: 0
DIZZINESS: 1
DOUBLE VISION: 0
CHILLS: 0
WHEEZING: 0
EYE PAIN: 0
MYALGIAS: 0
CLAUDICATION: 0
DIARRHEA: 0
NECK PAIN: 0
CONSTIPATION: 0
SEIZURES: 0
TINGLING: 0
BLURRED VISION: 0
SENSORY CHANGE: 0
PALPITATIONS: 0
BLOOD IN STOOL: 0
HEMOPTYSIS: 0
COUGH: 0
WEAKNESS: 1
HEADACHES: 0
SPUTUM PRODUCTION: 0
STRIDOR: 0

## 2018-12-26 ASSESSMENT — PAIN SCALES - GENERAL
PAINLEVEL_OUTOF10: 0
PAINLEVEL_OUTOF10: 0

## 2018-12-26 ASSESSMENT — PATIENT HEALTH QUESTIONNAIRE - PHQ9
2. FEELING DOWN, DEPRESSED, IRRITABLE, OR HOPELESS: NOT AT ALL
1. LITTLE INTEREST OR PLEASURE IN DOING THINGS: NOT AT ALL
SUM OF ALL RESPONSES TO PHQ9 QUESTIONS 1 AND 2: 0

## 2018-12-27 LAB
ALBUMIN SERPL BCP-MCNC: 4.2 G/DL (ref 3.2–4.9)
ALBUMIN/GLOB SERPL: 1.7 G/DL
ALP SERPL-CCNC: 50 U/L (ref 30–99)
ALT SERPL-CCNC: 11 U/L (ref 2–50)
ANION GAP SERPL CALC-SCNC: 10 MMOL/L (ref 0–11.9)
APTT PPP: 30.3 SEC (ref 24.7–36)
AST SERPL-CCNC: 16 U/L (ref 12–45)
BASOPHILS # BLD AUTO: 0.8 % (ref 0–1.8)
BASOPHILS # BLD: 0.07 K/UL (ref 0–0.12)
BILIRUB SERPL-MCNC: 1.3 MG/DL (ref 0.1–1.5)
BUN SERPL-MCNC: 18 MG/DL (ref 8–22)
CALCIUM SERPL-MCNC: 9.4 MG/DL (ref 8.5–10.5)
CHLORIDE SERPL-SCNC: 100 MMOL/L (ref 96–112)
CO2 SERPL-SCNC: 20 MMOL/L (ref 20–33)
CREAT SERPL-MCNC: 1.17 MG/DL (ref 0.5–1.4)
EKG IMPRESSION: NORMAL
EOSINOPHIL # BLD AUTO: 0.09 K/UL (ref 0–0.51)
EOSINOPHIL NFR BLD: 1 % (ref 0–6.9)
ERYTHROCYTE [DISTWIDTH] IN BLOOD BY AUTOMATED COUNT: 61.6 FL (ref 35.9–50)
EST. AVERAGE GLUCOSE BLD GHB EST-MCNC: 111 MG/DL
FOLATE SERPL-MCNC: 7.3 NG/ML
GLOBULIN SER CALC-MCNC: 2.5 G/DL (ref 1.9–3.5)
GLUCOSE SERPL-MCNC: 121 MG/DL (ref 65–99)
HBA1C MFR BLD: 5.5 % (ref 0–5.6)
HCT VFR BLD AUTO: 53.9 % (ref 42–52)
HGB BLD-MCNC: 18.2 G/DL (ref 14–18)
IMM GRANULOCYTES # BLD AUTO: 0.41 K/UL (ref 0–0.11)
IMM GRANULOCYTES NFR BLD AUTO: 4.6 % (ref 0–0.9)
LYMPHOCYTES # BLD AUTO: 0.39 K/UL (ref 1–4.8)
LYMPHOCYTES NFR BLD: 4.4 % (ref 22–41)
MAGNESIUM SERPL-MCNC: 1.8 MG/DL (ref 1.5–2.5)
MCH RBC QN AUTO: 32.7 PG (ref 27–33)
MCHC RBC AUTO-ENTMCNC: 33.8 G/DL (ref 33.7–35.3)
MCV RBC AUTO: 96.9 FL (ref 81.4–97.8)
MONOCYTES # BLD AUTO: 0.54 K/UL (ref 0–0.85)
MONOCYTES NFR BLD AUTO: 6 % (ref 0–13.4)
NEUTROPHILS # BLD AUTO: 7.43 K/UL (ref 1.82–7.42)
NEUTROPHILS NFR BLD: 83.2 % (ref 44–72)
NRBC # BLD AUTO: 0 K/UL
NRBC BLD-RTO: 0 /100 WBC
PLATELET # BLD AUTO: 190 K/UL (ref 164–446)
PMV BLD AUTO: 10.6 FL (ref 9–12.9)
POTASSIUM SERPL-SCNC: 3.7 MMOL/L (ref 3.6–5.5)
PROT SERPL-MCNC: 6.7 G/DL (ref 6–8.2)
RBC # BLD AUTO: 5.56 M/UL (ref 4.7–6.1)
SODIUM SERPL-SCNC: 130 MMOL/L (ref 135–145)
SODIUM SERPL-SCNC: 130 MMOL/L (ref 135–145)
SODIUM SERPL-SCNC: 131 MMOL/L (ref 135–145)
SODIUM SERPL-SCNC: 132 MMOL/L (ref 135–145)
TSH SERPL DL<=0.005 MIU/L-ACNC: 0.89 UIU/ML (ref 0.38–5.33)
VIT B12 SERPL-MCNC: 259 PG/ML (ref 211–911)
WBC # BLD AUTO: 8.9 K/UL (ref 4.8–10.8)

## 2018-12-27 PROCEDURE — 82746 ASSAY OF FOLIC ACID SERUM: CPT

## 2018-12-27 PROCEDURE — 99223 1ST HOSP IP/OBS HIGH 75: CPT | Mod: GC | Performed by: PSYCHIATRY & NEUROLOGY

## 2018-12-27 PROCEDURE — 80053 COMPREHEN METABOLIC PANEL: CPT

## 2018-12-27 PROCEDURE — G8980 MOBILITY D/C STATUS: HCPCS | Mod: CJ

## 2018-12-27 PROCEDURE — 770020 HCHG ROOM/CARE - TELE (206)

## 2018-12-27 PROCEDURE — 92610 EVALUATE SWALLOWING FUNCTION: CPT

## 2018-12-27 PROCEDURE — 82607 VITAMIN B-12: CPT

## 2018-12-27 PROCEDURE — 93010 ELECTROCARDIOGRAM REPORT: CPT | Performed by: INTERNAL MEDICINE

## 2018-12-27 PROCEDURE — G8978 MOBILITY CURRENT STATUS: HCPCS | Mod: CJ

## 2018-12-27 PROCEDURE — G8979 MOBILITY GOAL STATUS: HCPCS | Mod: CI

## 2018-12-27 PROCEDURE — 83735 ASSAY OF MAGNESIUM: CPT

## 2018-12-27 PROCEDURE — 700102 HCHG RX REV CODE 250 W/ 637 OVERRIDE(OP): Performed by: STUDENT IN AN ORGANIZED HEALTH CARE EDUCATION/TRAINING PROGRAM

## 2018-12-27 PROCEDURE — G8988 SELF CARE GOAL STATUS: HCPCS | Mod: CI

## 2018-12-27 PROCEDURE — A9270 NON-COVERED ITEM OR SERVICE: HCPCS | Performed by: STUDENT IN AN ORGANIZED HEALTH CARE EDUCATION/TRAINING PROGRAM

## 2018-12-27 PROCEDURE — 94760 N-INVAS EAR/PLS OXIMETRY 1: CPT

## 2018-12-27 PROCEDURE — 97161 PT EVAL LOW COMPLEX 20 MIN: CPT

## 2018-12-27 PROCEDURE — 84443 ASSAY THYROID STIM HORMONE: CPT

## 2018-12-27 PROCEDURE — G8997 SWALLOW GOAL STATUS: HCPCS | Mod: CI

## 2018-12-27 PROCEDURE — G8987 SELF CARE CURRENT STATUS: HCPCS | Mod: CI

## 2018-12-27 PROCEDURE — G8996 SWALLOW CURRENT STATUS: HCPCS | Mod: CK

## 2018-12-27 PROCEDURE — 36415 COLL VENOUS BLD VENIPUNCTURE: CPT

## 2018-12-27 PROCEDURE — 85025 COMPLETE CBC W/AUTO DIFF WBC: CPT

## 2018-12-27 PROCEDURE — 99223 1ST HOSP IP/OBS HIGH 75: CPT | Mod: GC | Performed by: INTERNAL MEDICINE

## 2018-12-27 PROCEDURE — 97165 OT EVAL LOW COMPLEX 30 MIN: CPT

## 2018-12-27 PROCEDURE — 85730 THROMBOPLASTIN TIME PARTIAL: CPT

## 2018-12-27 PROCEDURE — 84295 ASSAY OF SERUM SODIUM: CPT | Mod: 91

## 2018-12-27 RX ORDER — ATORVASTATIN CALCIUM 80 MG/1
80 TABLET, FILM COATED ORAL EVERY EVENING
Status: DISCONTINUED | OUTPATIENT
Start: 2018-12-27 | End: 2018-12-28 | Stop reason: HOSPADM

## 2018-12-27 RX ORDER — ASPIRIN 81 MG/1
162 TABLET, CHEWABLE ORAL DAILY
Status: DISCONTINUED | OUTPATIENT
Start: 2018-12-27 | End: 2018-12-28 | Stop reason: HOSPADM

## 2018-12-27 RX ORDER — LOSARTAN POTASSIUM 50 MG/1
100 TABLET ORAL DAILY
Status: DISCONTINUED | OUTPATIENT
Start: 2018-12-27 | End: 2018-12-28 | Stop reason: HOSPADM

## 2018-12-27 RX ORDER — LOSARTAN POTASSIUM 50 MG/1
100 TABLET ORAL DAILY
Status: DISCONTINUED | OUTPATIENT
Start: 2018-12-28 | End: 2018-12-27

## 2018-12-27 RX ADMIN — ASPIRIN 162 MG: 81 TABLET, CHEWABLE ORAL at 17:23

## 2018-12-27 RX ADMIN — ATORVASTATIN CALCIUM 80 MG: 80 TABLET, FILM COATED ORAL at 17:22

## 2018-12-27 RX ADMIN — LOSARTAN POTASSIUM 100 MG: 50 TABLET ORAL at 17:22

## 2018-12-27 ASSESSMENT — ACTIVITIES OF DAILY LIVING (ADL): TOILETING: INDEPENDENT

## 2018-12-27 ASSESSMENT — COGNITIVE AND FUNCTIONAL STATUS - GENERAL
DAILY ACTIVITIY SCORE: 23
MOBILITY SCORE: 23
SUGGESTED CMS G CODE MODIFIER MOBILITY: CI
SUGGESTED CMS G CODE MODIFIER DAILY ACTIVITY: CI
HELP NEEDED FOR BATHING: A LITTLE
CLIMB 3 TO 5 STEPS WITH RAILING: A LITTLE

## 2018-12-27 ASSESSMENT — PAIN SCALES - GENERAL
PAINLEVEL_OUTOF10: 0

## 2018-12-27 ASSESSMENT — ENCOUNTER SYMPTOMS
DIZZINESS: 0
LOSS OF CONSCIOUSNESS: 0
HEADACHES: 0
TREMORS: 0
SPEECH CHANGE: 0
SPUTUM PRODUCTION: 0
NAUSEA: 0
DOUBLE VISION: 0
NERVOUS/ANXIOUS: 0
VOMITING: 0
FEVER: 0
BLURRED VISION: 0
CHILLS: 0
TINGLING: 0
PALPITATIONS: 0
SINUS PAIN: 0
SHORTNESS OF BREATH: 0
ABDOMINAL PAIN: 0
BACK PAIN: 0
FOCAL WEAKNESS: 0
SENSORY CHANGE: 0
NEUROLOGICAL NEGATIVE: 1
WEAKNESS: 0
NECK PAIN: 0
COUGH: 0
DEPRESSION: 0
SEIZURES: 0

## 2018-12-27 ASSESSMENT — GAIT ASSESSMENTS
GAIT LEVEL OF ASSIST: SUPERVISED
DISTANCE (FEET): 250

## 2018-12-27 ASSESSMENT — COPD QUESTIONNAIRES
DURING THE PAST 4 WEEKS HOW MUCH DID YOU FEEL SHORT OF BREATH: NONE/LITTLE OF THE TIME
HAVE YOU SMOKED AT LEAST 100 CIGARETTES IN YOUR ENTIRE LIFE: YES

## 2018-12-27 ASSESSMENT — LIFESTYLE VARIABLES: EVER_SMOKED: YES

## 2018-12-27 NOTE — ASSESSMENT & PLAN NOTE
- Patient 19.3/55.9%, RDW elevated at 61.4, MCH elevated at 33.2 on admission.  Normal RBC, platelet count.  MCV does not show macrocytosis. Patient's renal function does not show dehydration.  He does not look dry on exam.  This is concerning for chronic hypoxia.   - B12 and folate within the reference ranges    Plan:  - Continue treatment for COPD  - Continue to monitor

## 2018-12-27 NOTE — CARE PLAN
Problem: Safety  Goal: Will remain free from injury  Outcome: PROGRESSING AS EXPECTED  Fall precautions in place. Bed alarm on.    Problem: Venous Thromboembolism (VTW)/Deep Vein Thrombosis (DVT) Prevention:  Goal: Patient will participate in Venous Thrombosis (VTE)/Deep Vein Thrombosis (DVT)Prevention Measures  Outcome: PROGRESSING AS EXPECTED  SCDs in place

## 2018-12-27 NOTE — ASSESSMENT & PLAN NOTE
- No symptoms today  - New ischemic stroke is also embolic and tiny in the R periventricular white matter based on Neurologist's review of [facility]  - Past stroke about 6 months before was ischemic and embolic in the R posterior frontoparietal region  - Likely etiology of both strokes is 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 at its origin seen on CT  - Moderate stenosis was also found diffusely elsewhere most likely due to his smoking since his LDL 86    Plan:  - Continue Q 4 hours neurochecks and vitals  - Seizure/aspiration/fall precautions  - Aspirin 162 mg daily for 90 days  - Plavix 75 mg daily  - Atorvastatin 80 mg daily  - Maintain BP < 140/90

## 2018-12-27 NOTE — ASSESSMENT & PLAN NOTE
- /93  - On home amlodipine, hydrochlorothiazide, losartan  - Home antihypertensives were held for permissive hypertension in this patient with CVA/TIA    Plan:  - Restarting losartan to maintain blood pressure per neurology recs  - Continue to monitor

## 2018-12-27 NOTE — ASSESSMENT & PLAN NOTE
Stable.  Denies LUTs.     Plan:  - Resume tamsulosin 0.4 mg nightly after period of permissive hypertension  - Continue to monitor

## 2018-12-27 NOTE — SENIOR ADMIT NOTE
[facility] [place] [facility] SENIOR ADMIT [place]:    Patient ID:   Name:             Bg Andrea     YOB: 1940  Age:                 78 y.o.  male   MRN:               [id number]                                                          Chief Complaint:       Transient lower extremity weakness (per patient bilateral) and transient lightheadedness 12/25/2018    History of Present Illness:    [facility] and ? IR.    Mr. Andrea is a 78 y.o. 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 crtiical Rt ICA stenosis at that time on [facility] imaging but no significant intracranial or extracranial narrowing per [facility] imaging, [facility]. [facility] ~ 1pm.  On 12/25 noon while stepping from outside to inside his house, he felt B/L leg heaviness and numbness, went and sat on chair, wife noticed slurred speech, the symptoms in his legs resolved in 10 mins, and speech returned to normal 10 mins after.  Wife called EMS, and he walked to the ambulance with a stable gait. [facility] for possible [facility]. CT Head showed possible interval extension of his prior CVA from June 2018, neurology recommended transfer to [facility] on 12/25 for an MRI but patient refused transfer o 12/25 and preferred to wait for an MRI at the [facility] on 12/26. His NIHSS was 1 and GCS was 15 at the [facility]. He was commenced on aspirin 162 mg and atorvastatin 40 mg, with permissive HTN. On 12/26/18, physical exam continued to remain normal. He was completely asymptomatic during [facility] stay [facility] the [facility]. 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 The patient was then  transferred [facility] to [facility] for possible IR intervention.     Per day team senior who discussed with IR here at [facility], not for IR intervention. Neurology Dr. Weathers was also consulted who contacted day senior and suggested no neurosurgical intervention needed.     ROS: Dizziness - resolved by the time he got to [facility] ER on 12/[facility].    Vitals : afebrile, /83, sats 93% on RA, HR 68/min    LABS: Requested at [facility]. 12/26 [facility] labs : Na was low at 131, normal glucose, BUN/creat 13/1.2, eGFR 58. CBC unremarkable except for Hb 18.7, RDW slightly high at 18. Ammonia 34. INR 1.1,     EKG : At [facility] - QTc 460, NSR, PVCs. HR 86, KS interval 172. EKG at [facility] on 12/25/18 - PVCs, NSR.     IMAGING: Images from [facility] sent to radiology. CXR - NORMAL.  CT Head 12/25 at [facility] - right occipital infarct, area of low attenuation in right occipital lobe on 12/25 is larger than the infarct seen on MTI from 6/30/2018., likely represents interval extension of the right occipital infarct, uncertain chronicity, no intracranial hemorrhage.    MRI 12/[facility]/18 from [facility] - (compared to MRI 6/30/2018) : Interval complete occlusion of visualized right ICA into the right M1 segment. Diminshed caliber of entire right M2 and M3 segments. Extensive acute infarct about previously documented right parietal infarct and new infarct near the posterior limb right internal capsule.     Active Ambulatory Problems     Diagnosis Date Noted   • Cerebrovascular accident (CVA) due to embolism (HCC) 07/03/2018   • COPD (chronic obstructive pulmonary disease) (HCC) 07/03/2018   • HTN (hypertension) 07/03/2018     Resolved Ambulatory Problems     Diagnosis Date Noted   • Severe aortic stenosis 07/03/2018     Past Medical History:   Diagnosis Date   • Cancer (HCC)    • COPD (chronic obstructive pulmonary disease) ([facility])    • Hypertension    • Severe aortic stenosis    • Severe aortic stenosis 7/3/2018   • Stroke ([facility])      PAST MEDICAL HISTORY :  Hypertension  COPD - per chart review,  patient denies.   CVA in June 2018    PAST SURGICAL HISTORY :  Appendectomy    SOCIAL HISTORY :  Tobacco - started age 15-18 yrs, 1 ppd for > 60 years, cut down to 3-4 cigs/day past 2 years  EtOH - quit 3-4 years ago, was drinking 3-4 beers/day since age of 15-18  Illicits - denies    HOME MEDICATIONS : (from [facility])  Aspirin NON-va  Amlodipine 5mg/day  Losartan 100 mg/day  Amlodipine 5 mg/day  HCTZ 25 mg/day  Tamsulosin 0.4 mg/day      PHYSICAL EXAM  Vitals:   Weight/BMI: There is no height or weight on file to calculate BMI.  Blood pressure 151/83, pulse 68, temperature 36.4 °C (97.6 °F), temperature source Temporal, resp. rate 16, SpO2 93 %.  Vitals:    12/26/18 1735   BP: 151/83   Pulse: 68   Resp: 16   Temp: 36.4 °C (97.6 °F)   TempSrc: Temporal   SpO2: 93%     Oxygen Therapy:  Pulse Oximetry: 93 %, O2 (LPM): 0, O2 Delivery: None (Room Air)      Physical Exam   Constitutional: He is oriented to person, place, and time and well-developed, well-nourished, and in no distress. No distress.   HENT:   Head: Normocephalic and atraumatic.   Mouth/Throat: Oropharynx is clear and moist. No oropharyngeal exudate.   Eyes: Pupils are equal, round, and reactive to light. Conjunctivae are normal. Right eye exhibits no discharge. Left eye exhibits no discharge. No scleral icterus.   Neck: Normal range of motion.   Cardiovascular: Normal rate, regular rhythm and normal heart sounds.    No murmur heard.  Pulmonary/Chest: Effort normal and breath sounds normal. No respiratory distress. He has no wheezes. He has no rales.   Abdominal: Soft. Bowel sounds are normal. He exhibits no distension. There is no tenderness. There is no rebound and no guarding.   Musculoskeletal: Normal range of motion. He exhibits no edema or tenderness.   Lymphadenopathy:     He has no cervical adenopathy.   Neurological: He is alert and oriented to person, place, and time. GCS score is 15.   Slight facial asymmetry, right sided mild droop.  RUE and RLE 5/5    LLE and LUE 4+/5  Tone normal all extremities, sensation wnl  Cranial nerves II - XII  Cerebellar intact  Gait normal   Skin: Skin is warm and dry. No rash noted. He is not diaphoretic. No erythema.   Psychiatric: Mood, memory and affect normal.         ASSESSMENT:  # Small subacute CVA  # Hyponatremia  # Hypertension  # COPD, pt denies, not on inhalers  # h/o CVA June 2018      PLAN:  # Admitted to neurology  # Labs CBC, CMP, Lipid profile, A1c, INR  # NPO, will need swallow eval due to facial asymmetry  # Per RN - due to asymmetry, he will fail bedside swallow eval, and needs to have formal SLP eval morning  # PT, OT  # SLP eval tomorrow  # Continue aspirin, atorvastatin  # Await formal neuro eval, Dr. Weathers was informed of patient by day team  # Per day team resident who dicussed with IR --> not for neuro intervention.  # Need to get info on home meds  # Hold anti-HTN sive meds to allow permissive HTN.     Looked up on [facility]. Patient had 162 aspirin and 40 mg lipitor at 11 am today. Discussed with Dr. Weathers, infarct appears to be sub-acute and not new. Recommed give plavix 75 mg and lipitor 40 mg tonight. Per Dr. Weathers, does not need CTA. From tomorrow, added plavix 75 mg/day and Lipitor 80 mg daily. Dr. Weathers will review patient tomorrow. Team to discuss with IR tomorrow.    Please refer to Intern Dr. Jaquez's H&P for complete admission details.

## 2018-12-27 NOTE — CONSULTS
NEUROLOGY INPATIENT CONSULT  HISTORY & PHYSICAL    IDENTIFYING DATA       A.  Patient: Bg Andrea, MRN: [id number], : 1940, Bed: S195/02       B.  Data source:  Patient             Subjective accuracy/reliability of data source:  reliable.       C.  Medical records reviewed: prior inpatient notes, labs and images.       D.  Interviewer:  Renny Erwin M.D. PGY2       E.  Requesting Physician: Yunior Gregorio M.D.    Admission Date: 2018    Reason for Consultation: Recurrent stroke     HPI:  Mr. Bg Andrea is a 78 y.o. Left-handed man with PMHs as listed below including hypertension on multiple anti hypertensives, recent CVA in 2018 thought to be embolic in nature with unknown source, strong 60 pack year smoking history, currently smoking a few cigarettes a day [facility] weakness associated with slurred speech that resolved after about 10 minutes on 2018. He did not note any focal weakness or LOC. Denied preceding trauma or fall and was able to take a seat as soon as he felt leg weakness.     Since that time patient has had no recurrence of those symptoms or any new neurological complaints. Denies focal weakness, numbness, tingling, speech changes, or gait imbalance or unsteadiness. Patient [facility] CT and [facility] on [facility] and 2018 showing recurrent extensive acute infarct around the previously identified parietal infarct as well as a new infarct near the posterior limb of right internal capsule. It was also noted that patient has complete interval occlusion of right ICA into the right M1 segment and diminished M2 and M3 segments.     Patient was transferred to [facility] for consideration of a surgical/radiological intervention. However, after primary team discussed case with IR, no intervention was recommended.   Patient currently asymptomatic with no acute complaints and no new neurological complaints at time of history and exam.      Review of systems:  1. General: No weight change, fevers or chills, or nightsweats, or generalized fatigue.   2. Head: No headaches or dizziness or lightheadedness  3. Eyes: No vision changes, vision loss or pain  4. Respiratory: No shortness of breath, cough, sputum, or wheezing  5. Cardiac: No chest pain, palpitations, CORTES, or orthopnea  6. Gastrointestinal: no nausea, vomiting, no abdominal pain or change in bowel habits, no melena or hematochezia  7. Urinary: no dysuria, frequency, or hesitancy.  8. Peripheral vascular: No leg cramps  9. Neurological: per HPI  10. Musculoskeletal: no cramps or pain, no atrophy, no joints pain or stiffness.  11. Psychiatric: No anxiety or depression  12. Hematologic: no rash, no easy bleeding or bruising, no epistaxis.    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   • COPD (chronic obstructive pulmonary disease) (HCC)    • Dementia 12/26/2018   • Hypertension    • Severe aortic stenosis    • Severe aortic stenosis 7/3/2018   • Stroke (HCC)     6/30/18     Patient Active Problem List   Diagnosis   • Cerebrovascular accident (CVA) due to embolism (HCC)   • COPD (chronic obstructive pulmonary disease) (HCC)   • HTN (hypertension)   • Hyponatremia   • Hyperglycemia   • Elevated hematocrit   • Dementia   • BPH (benign prostatic hyperplasia)   • Leukocytosis     Past Surgical History:   Procedure Laterality Date   • LOBECTOMY Left    • OTHER      right hand finger   • OTHER ORTHOPEDIC SURGERY      back sx x3       Social History:   Social History   Substance Use Topics   • Smoking status: Current Every Day Smoker     Packs/day: 1.00     Years: 60.00     Types: Cigarettes     Start date: 7/2/1950   • Smokeless tobacco: Never Used      Comment: cut down to 3-4 cigs/day past 2 years   • Alcohol use No      Comment: Quit 4 yrs ago. Rarely drank.        Family History:  Family History   Problem Relation Age of Onset   • No  "Known Problems Mother    • No Known Problems Father    • Cancer Neg Hx    • Heart Disease Neg Hx        Allergies:  No Known Allergies    Current Home Medications:  Prior to Admission medications    Medication Sig Start Date End Date Taking? Authorizing Provider   losartan (COZAAR) 100 MG Tab Take 1 Tab by mouth every day. 7/6/18   Diaz Trimble M.D.   hydroCHLOROthiazide (HYDRODIURIL) 12.5 MG tablet Take 1 Tab by mouth every day. 7/6/18   Diaz Trimble M.D.   amLODIPine (NORVASC) 10 MG Tab Take 1 Tab by mouth every day. 7/6/18   Diaz Trimble M.D.   aspirin EC (ECOTRIN) 81 MG Tablet Delayed Response Take 81 mg by mouth every day.    Physician Outpatient   atorvastatin (LIPITOR) 80 MG tablet Take 80 mg by mouth every evening.    Physician Outpatient   vitamin D (CHOLECALCIFEROL) 1000 UNIT Tab Take 1,000 Units by mouth every day.    Physician Outpatient   cyanocobalamin (VITAMIN B-12) 100 MCG Tab Take 100 mcg by mouth every day.    Physician Outpatient   tamsulosin (FLOMAX) 0.4 MG capsule Take 0.4 mg by mouth ONE-HALF HOUR AFTER BREAKFAST.    Physician Outpatient        Physical Exam:  /85   Pulse 75   Temp 36.3 °C (97.3 °F) (Temporal)   Resp 16   Ht 1.702 m (5' 7\")   Wt 72.8 kg (160 lb 7.9 oz)   SpO2 95%   BMI 25.14 kg/m²     GEN: comfortable, in NAD  EYES: Pupils 4mm->2mm bilaterally to light direct & consensual and accommodation. No RADP.   HEENT: non-icteric and non-injected eyes. moist conjunctivae; no lid-lag.  normocephalic.  Nontender sinuses.  Hearings (see below). Normal appearing TMs. The nares are patent.  Oropharynx clear without lesions and normal hard and soft palates.   NECK: Trachea midline, supple, no notable bruit, No lymphadenopathy  CV: Normal rate and rhythm. No murmurs. No rubs, no S3, S4.  PULM: CTA Bilaterally, no wheezes or crackles, good inspiratory effort and no intercostal retractions.   ABD: Normoactive bowel sounds, soft, nontender, nondistended. No palpable HSM  Ext: no " edema, Radial and dorsal petal pulses 2+ b/l  SKIN: No rashes, warm, dry, no lesions.  Psychiatric: normal mood, No active psychosis.     Neurological Examination:  1. Higher Functions: alert and oriented to self, place, time, person, normal language, speech, prosody and voice. Intact short and long term memories. Intact simple & complex calculations. No left to right confusion.  2. Cranial Nerves:  CN I: Not examined  CN II: left lower temporal quadrant abnormal on peripheral field testing  CN III, IV, VI: EOMI; no nystagmus; pupils 4mm->2mm bilaterally to light direct & consensual and accommodation. No RAPD.  CN V: sensation intact bilaterally  CN VII: face symmetric  CN VIII: hearing intact to finger rub bilaterally  CN IX, X: palate elevates symmetrically  CN XI: SCMs & trapezii 5/5 bilaterally  CN XII: tongue protrudes midline  3. Motor: normal muscle bulks, normal tones, 5/5 symmetric strength in b/l UEs and LEs.  4. Sensory: intact to light touch  5. Coordination/Gait: normal finger to nose and heel to shin test. Normal rapid fingers and foot motions. No over or undershooting, negative release test.  6. Involuntary Movements/Tremor: none  7. Reflexes: 2/2 symmetric throughout, no cross adduction, No Babinski. No clonus. No [place].  8. Gait: normal stance, negative Romberg, normal swing and casual gait on heels, toes, and tandem.      NIHSS STROKE SCALE:  1a. Level of Consciousness        0= Alert; keenly responsive    1b. LOC Questions (Age & Month)        0= Answers both questions correctly    1c. LOC Commands (Make fist & close eyes)        0= Performs both tasks correctly    2. Best Gaze        0= Normal    3. Visual        1= Partial hemianopia, left lower quadrant temporal defect    4. Facial palsy        0= Normal symmetrical movements    5a. L Motor arm        0= No drift; limb holds 90 (or 45) degrees for full 10 seconds         5b. R Motor arm        0= No drift; limb holds 90 (or 45) degrees for  full 10 seconds    6a. L Motor leg        0= No drift; limb holds 90 (or 45) degrees for full 10 seconds    6b. R Motor leg        0= No drift; leg holds 30-degree position for full 5 seconds     7. Limb ataxia        0= Absent    8. Sensory        0= Normal; no sensory loss    9. Best language        0= No aphasia    10. Dysarthria        0= Normal, has no teeth and does not wear dentures    11. Extinction and inattention (formerly neglect)        0= No abnormality    Total = 1    Results from last 7 days  Lab Units 12/27/18 0056 12/26/18 1850   CO2 104 mmol/L 20 23    mg/dL 18 20   CREATININE 109 mg/dL 1.17 1.34   GLUCOSE 112 mg/dL 121* 120*   CALCIUM 105 mg/dL 9.4 9.6   APTT 1602 sec 30.3  --        Recent Labs      12/26/18 1849 12/27/18 0056   WBC  11.1*  8.9   RBC  5.82  5.56   HEMOGLOBIN  19.3*  18.2*   HEMATOCRIT  55.9*  53.9*   MCV  96.0  96.9   MCH  33.2*  32.7   MCHC  34.5  33.8   RDW  61.4*  61.6*   PLATELETCT  209  190   MPV  10.3  10.6     Recent Labs      12/26/18 1850 12/27/18 0056 12/27/18 0451   SODIUM  130*  130*  130*   POTASSIUM  4.3  3.7   --    CHLORIDE  100  100   --    CO2  23  20   --    GLUCOSE  120*  121*   --    BUN  20  18   --    CREATININE  1.34  1.17   --    CALCIUM  9.6  9.4   --              Recent Labs      12/26/18 1850   TRIGLYCERIDE  123   HDL  39*   LDL  86     Recent Labs      12/26/18 1850 12/27/18 0056 12/27/18 0451   SODIUM  130*  130*  130*   POTASSIUM  4.3  3.7   --    CHLORIDE  100  100   --    CO2  23  20   --    GLUCOSE  120*  121*   --    BUN  20  18   --      Recent Labs      12/26/18 1850 12/27/18 0056 12/27/18 0451   SODIUM  130*  130*  130*   POTASSIUM  4.3  3.7   --    CHLORIDE  100  100   --    CO2  23  20   --    BUN  20  18   --    CREATININE  1.34  1.17   --    MAGNESIUM  2.0   --   1.8   CALCIUM  9.6  9.4   --      Recent Labs      12/27/18   0056   APTT  30.3       Radiology/Reports: independently reviewed by me (see  below).    ASSESSMENT:  Mr. Bg Andrea is a 78 y.o. Left-handed man with PMHs of hypertension on multiple anti hypertensives, recent CVA in 6/2018 thought to be embolic in nature, 60 pack year smoking history, currently smoking a few cigarettes a day [facility]LE weakness associated with slurred speech that resolved after about 10 minutes on 12/25/2018. He did not note any focal weakness. Denied preceding trauma.   Based on review of imaging, patient noted to have recurrent right parietal infarct with complete occlusion of the Right ICA. [facility]. IR recommended against any intervention for the R ICA occlusion.    Secondary prevention with medical management is appropriate at this time for patients recurrent stroke and ongoing risk factors such as smoking and non compliance.     RECOMMENDATIONS:  -Clopidogrel 75mg once daily  -Aspirin 162mg once daily for 90 days, stop taking aspirin after 90 days  -Atorvastatin 80mg every night  -Appropriate blood pressure medication with goal BP of less than 140/90 is appropriate.   -If patient had echocardiogram done at [facility] during June/July 2018 admission for stroke, then no need to repeat echo as likely source of emboli was from the Right internal carotid artery. However, if echo was not performed at that time, then repeat ECHO needs to be performed.   -Patient counseled on smoking cessation  -[facility] with [facility] in 1 month    The above was discussed by the neurology team in detail with the primary team. Neurology will sign off. Thank you.

## 2018-12-27 NOTE — PROGRESS NOTES
Direct admit from [facility] For Acute Right Parietal Infarct . Admitting MD is Dr. Gregorio, [facility]. ADT order signed and held, to be released upon patient's arrival on the unit. Patient arriving via ground.

## 2018-12-27 NOTE — THERAPY
"Physical Therapy Evaluation completed.   Bed Mobility:  Supine to Sit: Supervised (HOB flat, no rail)  Transfers: Sit to Stand: Supervised  Gait: Level Of Assist: Supervised with No Equipment Needed       Plan of Care: Patient with no further skilled PT needs in the acute care setting at this time  Discharge Recommendations: Equipment: No Equipment Needed. Post-acute therapy: Recommend outpatient or home health transitional care services for continued physical therapy services.    See \"Rehab Therapy-Acute\" Patient Summary Report for complete documentation.       Patient is a pleasant 79 YO male that was admitted following complaints of BLE heaviness and numbness and slurred speech. Patient with PMHx significant for prior CVA, HTN, COPD. Imaging revealed previous parietal infarct and new infarct near the posterior limb of right internal capsule,  interval complete occlusion of Right ICA into the right M1 segment, diminished M2 and M3 segments. Patient presented to PT with impaired coordination and x1 collision on L side with apparent neglect though not observed during ambulation in coon. Patient ambulated approximately 350ft without AD with supervision with challenges to gait and no LOB. Patient appears to be at functional mobility baseline. Patient may benefit from outpatient PT following medical DC to address higher level balance. No further acute PT needs.  "

## 2018-12-27 NOTE — PROGRESS NOTES
Assumed care of patient at 0700.    Received report from night RN.    Pt alert and oriented x 4, has no complaints of pain.  Pt resting comfortably in bed.   Bed alarm on.  Hourly rounding implemented.

## 2018-12-27 NOTE — ASSESSMENT & PLAN NOTE
- Mildly elevated at 120 on admission.  No history of diabetes.  Likely stress response.  - HbA1c 5.5    Plan:   - IVF  - Continue to monitor

## 2018-12-27 NOTE — THERAPY
"  Speech Language Therapy Clinical Swallow Evaluation completed.  Functional Status: Pt seen this date for clinical swallow evaluation 2/2 CVA and failed nurse dysphagia screen. Pt was A&Ox3,  presented with slurred speech, required multiple explanations to allow PO, intermittently followed directions and cooperated with encouragement. Oral mechanism examination completed, pt presents with right facial droop. Lingual, labial and jaw structure appreciated to be WFL, but reduced ROM noted. Pt is edentulous and reports he does not have dentures and is able to \"eat everything\". PO trials of ice, NTL, purees, mixed consistency and thins completed. Hyolaryngeal elevation palpated as complete and timely initiation of swallow appreciated. Adequate rotary jaw movement for mastication and manipulation of bolus, however given his edentulous status pt unable to efficiently chew mixed consistency and needed it removed from the oral cavity. Throat clear appreciated x2 with ice chips, x2 with thins by tsp and x1 with NTL. Cough and wet vocal quality with thins by cup appreciated x2. Wet vocal quality cleared with mod cueing to cough and double swallow. No other s/sx of aspiration appreciated with all other consistencies consumed. Pt declined further PO and verbalized agreement to POC. SLP following.     Recommendations - Diet: Diet / Liquid Recommendation: (P) Dysphagia I, Nectar Thick Liquid                          Strategies: Monitor during meals, No Straws and Head of Bed at 90 Degrees                          Medication Administration: Medication Administration : (P) Whole with Liquid Wash  Plan of Care: Will benefit from Speech Therapy 5 times per week  Post-Acute Therapy: Recommend inpatient transitional care services for continued speech therapy services.        See \"Rehab Therapy-Acute\" Patient Summary Report for complete documentation.   "

## 2018-12-27 NOTE — ASSESSMENT & PLAN NOTE
- Patient asymptomatic  - Seems to be chronic since 6 months    Plan:  - IVF  - Continue to monitor

## 2018-12-27 NOTE — PROGRESS NOTES
Internal Medicine Interval Note  Note Author: Theo Steward M.D.     Name Bg Andrea 1940   Age/Sex 78 y.o. male   MRN [id number]   Code Status FULL     After 5PM or if no immediate response to page, please call for cross-coverage  Attending/Team: Dr. Gregorio/Cesar See Patient List for primary contact information  Call [phone] to page    1st Call - Day Intern (R1):   Dr. Steward 2nd Call - Day Sr. Resident (R2/R3):   Dr. Edwards         Reason for interval visit  (Principal Problem)   [facility]    Interval Problem Daily Status Update  (24 hours, problem oriented, brief subjective history, new lab/imaging data pertinent to that problem)   No acute events overnight. Patient states he is feeling better and does not have any weakness now. No complaints of pain. He is able to carry out all his daily activities.    Per neurology recommendations, dual antiplatelets aspirin and Plavix for 90 days; then continue Plavix.  Atorvastatin 80 mg regardless of LDL level.  Blood pressure to be maintained below 140/90. Will restart losartan.  Patient was advised to follow-up with [facility] in 1 month.    Per PT/OT recommendations, patient will need home health.    [facility] showed acute CVA with worsening of right carotid disease.    Review of Systems   Constitutional: Negative for chills and fever.   HENT: Negative for congestion and sinus pain.    Eyes: Negative for blurred vision and double vision.   Respiratory: Negative for cough, sputum production and shortness of breath.    Cardiovascular: Negative for chest pain, palpitations and leg swelling.   Gastrointestinal: Negative for abdominal pain, nausea and vomiting.   Genitourinary: Negative for dysuria and urgency.   Musculoskeletal: Negative for back pain and neck pain.   Skin: Negative for itching and rash.   Neurological: Negative.  Negative for dizziness, tingling, tremors, sensory  change, speech change, focal weakness, seizures, loss of consciousness, weakness and headaches.   Psychiatric/Behavioral: Negative for depression. The patient is not nervous/anxious.        Disposition/Barriers to discharge:   HH acceptance    Consultants/Specialty  Neurology  PCP: No primary care provider on file.      Quality Measures  Quality-Core Measures   Reviewed items::  EKG reviewed, Labs reviewed, Medications reviewed and Radiology images reviewed  Matt catheter::  No Matt  DVT prophylaxis - mechanical:  SCDs          Physical Exam       Vitals:    12/27/18 0400 12/27/18 0426 12/27/18 0745 12/27/18 1050   BP: 120/73  156/85 160/93   Pulse: 77 76 75 74   Resp: 16 18 16 16   Temp: 37.1 °C (98.8 °F)  36.3 °C (97.3 °F) 36.7 °C (98 °F)   TempSrc: Temporal  Temporal Temporal   SpO2: 95% 95% 95% 97%   Weight:       Height:         Body mass index is 25.14 kg/m². Weight: 72.8 kg (160 lb 7.9 oz)  Oxygen Therapy:  Pulse Oximetry: 97 %, O2 (LPM): 0, FiO2%: 21 %, O2 Delivery: None (Room Air)    Physical Exam   Constitutional: He is well-developed, well-nourished, and in no distress.   HENT:   Head: Normocephalic and atraumatic.   Eyes: Pupils are equal, round, and reactive to light. No scleral icterus.   Neck: Normal range of motion. Neck supple.   Cardiovascular: Normal rate, regular rhythm and normal heart sounds.    Pulmonary/Chest: Effort normal and breath sounds normal. No respiratory distress.   Abdominal: Soft. Bowel sounds are normal. He exhibits no distension. There is no tenderness.   Musculoskeletal: Normal range of motion. He exhibits no edema or tenderness.   Neurological: He is alert. He has normal reflexes. No cranial nerve deficit. He exhibits normal muscle tone. Coordination normal.   Skin: Skin is warm. No erythema.   Psychiatric: Mood and affect normal.             Assessment/Plan     * Cerebrovascular accident (CVA) due to embolism (HCC)- (present on admission)   Assessment & Plan    - No  symptoms today  - New ischemic stroke is also embolic and tiny in the R periventricular white matter based on Neurologist's review of the [facility]  - Past stroke about 6 months before was ischemic and embolic in the R posterior frontoparietal region  - Likely etiology of both strokes is 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 at its origin seen on CT  - Moderate stenosis was also found diffusely elsewhere most likely due to his smoking since his LDL 86    Plan:  - Continue Q 4 hours neurochecks and vitals  - Seizure/aspiration/fall precautions  - Aspirin 162 mg daily for 90 days  - Plavix 75 mg daily  - Atorvastatin 80 mg daily  - Maintain BP < 140/90     HTN (hypertension)- (present on admission)   Assessment & Plan    - /93  - On home amlodipine, hydrochlorothiazide, losartan  - Home antihypertensives were held for permissive hypertension in this patient with CVA/TIA    Plan:  - Restarting losartan to maintain blood pressure per neurology recs  - Continue to monitor     Dementia- (present on admission)   Assessment & Plan    - On admission, mini-mental state score 16/30 denoting moderate dementia     Plan:  - Recommend formal evaluation for dementia  - B12/folate level pending  - TSH pending     Leukocytosis- (present on admission)   Assessment & Plan    WBC elevated to 11.1 on admission with left shift.  Likely stress response.  Patient afebrile, no tachycardia, no tachypnea SIRS 1/4.     Plan:  -Continue to monitor     BPH (benign prostatic hyperplasia)- (present on admission)   Assessment & Plan    Stable.  Denies LUTs.     Plan:  - Resume tamsulosin 0.4 mg nightly after period of permissive hypertension  - Continue to monitor     Elevated hematocrit- (present on admission)   Assessment & Plan    Patient 19.3/55.9%, RDW elevated at 61.4, MCH elevated at 33.2 on admission.  Normal RBC, platelet count.  MCV does not show macrocytosis. Patient's renal function does not show  dehydration.  He does not look dry on exam.  This is concerning for chronic hypoxia.     Plan:  - Continue treatment for COPD  - B12/folate pending  - Continue to monitor     Hyperglycemia- (present on admission)   Assessment & Plan    Mildly elevated at 120 on admission.  No history of diabetes.  Likely stress response.    Plan:   - A1c pending  - IVF  - Continue to monitor     Hyponatremia- (present on admission)   Assessment & Plan    - Patient asymptomatic  - Seems to be chronic since 6 months    Plan:  - IVF  - Continue to monitor     COPD (chronic obstructive pulmonary disease) (HCC)- (present on admission)   Assessment & Plan    Patient denies having a history of COPD.  Was on problem list on discharge summary notes that he was on ipratropium/albuterol.  Patient denies use of inhalers or supplemental oxygen.  No hypoxia on room air.    Plan:  - Supplemental on oxygen as needed   - RT protocol  - Continue to monitor

## 2018-12-27 NOTE — DISCHARGE PLANNING
PMR referral from Dr. Roberto    Stroke protocol acute versus sub-acute infarct, ongoing medical work up. Therapy evaluation not available no 6 clicks. Will follow for post acute need determination. No Physiatry consult ordered at this time.

## 2018-12-27 NOTE — THERAPY
"Occupational Therapy Evaluation completed.   Functional Status:  SPv w/bed mobility, spv w/LB dressing, spv w/sit>Stand walking in room no AD no LOB, spv w/grooming standing at sink, possibly generally low vision. No over signs of incoordination or UE weakness, no c/o dizziness or pain. Returned to chair in room, expressed concern through out session about getting home to care for his SO. Pt remained in chair w/alarm on and call light and tray table w/in reach RN aware of session and pts efforts   Plan of Care: 1-2 more tx to further assess vision  Discharge Recommendations:  Equipment: No Equipment Needed. Post-acute therapy Recommend home health or outpatient transitional care services for continued occupational therapy services- Recommend additional vision and visual motor assessment as outpt       See \"Rehab Therapy-Acute\" Patient Summary Report for complete documentation.      78yr old male admitted for transient LE weakness and lightheadedness. PHMx recent CVA, COPD, HTN, chronic smoker, CA and aortic stenosis. This admission pt was found to have an acute CVA, w/right occipital infarct, occulusion of R ICA and extensive acute infarct from previously documented right parietal and new posterior limb capsule. Pt demonstrated ability to complete most basic ADL's, did observe impaired visual pursuits w/poor head eye disassociation and 1x running to to wall on L side as well as less initiation for reaching for items to the L. Anticipate pt will progress in this setting to home w/out pt services, plan to further assess vision. Recommend HH for home safety and then transition to outpt vision therapy as needed.   "

## 2018-12-27 NOTE — ASSESSMENT & PLAN NOTE
WBC elevated to 11.1 on admission with left shift.  Likely stress response.  Patient afebrile, no tachycardia, no tachypnea SIRS 1/4.     Plan:  -Continue to monitor

## 2018-12-27 NOTE — PROGRESS NOTES
Assumed care of patient at 1900. Pt is YANIRA/ALIDA Mc. Pt denies n/t. Denies pain at this time. VSS on RA. Up with SBA to bathroom. Tele and SCDs in place. Bed alarm on. Call light within reach. Hourly rounding in place.

## 2018-12-27 NOTE — ASSESSMENT & PLAN NOTE
Patient denies having a history of COPD.  Was on problem list on discharge summary notes that he was on ipratropium/albuterol.  Patient denies use of inhalers or supplemental oxygen.  No hypoxia on room air.    Plan:  - Supplemental on oxygen as needed   - RT protocol  - Continue to monitor

## 2018-12-27 NOTE — ASSESSMENT & PLAN NOTE
- On admission, mini-mental state score 16/30 denoting moderate dementia     Plan:  - Recommend formal evaluation for dementia  - B12/folate level pending  - TSH pending

## 2018-12-27 NOTE — PROGRESS NOTES
Updates: pt arrived at about 5:15 PM, and we saw him at 5:17pm     Pt is 78 year old male with past medical history significant for hypertension, COPD, cerebrovascular accident [facility], with right ICA critical stenosis. Started having eft sided weakness yesterday  [facility] and suggestive of extensive new infarct.    I Called IR to discuss earlier on and they think pt may not need IR intervention. I consulted neurology and they are yet to see patient.   Neurosurgery will be consulted after neurology eval.    I called Dr Yoel Weathers who will come see  Patient and review images from [facility] as well(Images yet to be uploaded)    I have handed over to night team and they will  follow neuro recs. They were informed of need for close  Monitoring and urgent neurosurgery consult if needed, as per neurology  Orders have been put in.

## 2018-12-27 NOTE — PROGRESS NOTES
Monitor Summary: SR 70-89, MO .16, QRS .08, QT .36 with occasional to frequent PVC, Bigeminy, couplets, trigeminy,  per strip from monitor room.

## 2018-12-27 NOTE — PROGRESS NOTES
2 RN skin check completed with Tahsa SAINI. Pt has blanching redness to bilateral heels. All other skin WDL.

## 2018-12-27 NOTE — PROGRESS NOTES
Received report from Roberta SAINI.   Pt arrived to unit at 1700.   MD [id number]  Pt is A&Ox4.   Pt with NIH of 1 at this time due to right sided facial droop.   Pt denies n/v, n/t, and pain at this time.  Tele monitor in place.   Pt is standby assist from gurney to bed with steady gait.   Pt is strict NPO at this time per MD order.   Pt oriented to room and call light.   Plan of care discussed.   Hourly rounding in place.

## 2018-12-27 NOTE — H&P
"      Internal Medicine Admitting History and Physical    Note Author: Ab Giraldo M.D.       Name Bg Andrea 1940   Age/Sex 78 y.o. male   MRN [id number]   Code Status FULL     After 5PM or if no immediate response to page, please call for cross-coverage  Attending/Team: Dr. Gregorio/Cesar See Patient List for primary contact information  Call [phone] to page    1st Call - Day Intern (R1):   Dr. Steward 2nd Call - Day Sr. Resident (R2/R3):   Dr. Edwards       Chief [facility]   [facility]    HPI:  Mr. Andrea is an 78-year-old left-handed male  [facility].  His past medical history significant for hypertension, COPD, CVA (2018, suspected embolic stroke of the right ICA, with left arm weakness[facility] concerning for distal right ICA stenosis, evaluated at [facility]; IR carotid angiogram 18, with no evidence of stenosis) and severe aortic stenosis.     Patient says on the 18 he returned from being outside with his wife and while climbing up a ramp when, he suddenly felt his \"legs feel like lead\", he had difficulty lifting up his legs to walk up the ramp into his house, he was dizzy and says his wife reported he had \"mumbled speech\".  He sat down for about 5-10 minutes with complete resolution of his lower extremity sensations, dysarthria and dizziness.  He denies palpitations prior to symptom onset. His wife called EMS.  Patient's symptoms had completely resolved by the time EMS arrived, he was able to drink a cup of coffee without choking and he walked with a stable gait to the ambulance.  [facility]  and was admitted for stroke work up.  CT head showed possible extension of area of infarct from his prior CVA on 2018.  He was started on aspirin 162 mg daily and atorvastatin 40 mg daily with permissive hypertension.  He did not have " "a return of [facility] the [facility], neither did he have any new symptoms, he was able to ambulate, and eat without choking. He elected to [facility] the [facility] on [facility] instead of being transferred for MRI at [facility] on 12/25.   Per Dr. Westfall's note, [facility]) showed \"extensive acute infarct around the previously identified parietal infarct as well as a new infarct near the posterior limb of right internal capsule,  interval complete occlusion of Right ICA into the right M1 segment, diminished M2 and M3 segments.\"     Mr. Andrea was transferred to [facility] for possible IR intervention.  Per day team Senior, patient will not have any interventions. Neurology, Dr. Weathers was also consulted and agrees with no need for neurosurgical intervention.     His vitals on presenting to [facility]: /83, pulse 68, RR 16 saturating to 93% on room air, afebrile.  His CBC showed elevated white count at 11.1 with elevation in immature granulocytes, hemoglobin, hematocrit, MCH, and RDW were all elevated.  CMP showed hyponatremia 130, hyperglycemia 120, eGFR normal, total bilirubin elevated at 1.6.  His lipid panel showed total cholesterol 150, LDL 86, HDL 39.  EKG showed QTc:460, HR: 86, Normal Sinus Rhythm, with multiple PVCs, no ST changes.       Review of Systems   Constitutional: Negative for chills, diaphoresis, fever, malaise/fatigue and weight loss.   HENT: Negative for congestion, ear pain, hearing loss, sore throat and tinnitus.    Eyes: Negative for blurred vision, double vision, photophobia and pain.   Respiratory: Negative for cough, hemoptysis, sputum production, shortness of breath, wheezing and stridor.    Cardiovascular: Negative for chest pain, palpitations, orthopnea, claudication, leg swelling and PND.   Gastrointestinal: Negative for abdominal pain, blood in stool, constipation, diarrhea, heartburn, melena, nausea and vomiting.   Genitourinary: Negative for dysuria, frequency, hematuria " and urgency.   Musculoskeletal: Positive for back pain. Negative for falls, joint pain, myalgias and neck pain.   Neurological: Positive for dizziness, speech change, focal weakness and weakness. Negative for tingling, tremors, sensory change, seizures, loss of consciousness and headaches.        Dizziness, bilateral Cain lower extremity weakness, and dysarthria or resolved within 10 minutes of onset on 12/25   Psychiatric/Behavioral: Negative.             Past Medical History (Chronic medical problem, known complications and current treatment)    Past Medical History:   Diagnosis Date   • Cancer (HCC)     skin cancer on facial   • COPD (chronic obstructive pulmonary disease) (HCC)    • Hypertension    • Severe aortic stenosis    • Severe aortic stenosis 7/3/2018   • Stroke (HCC)     6/30/18         Past Surgical History:  Past Surgical History:   Procedure Laterality Date   • LOBECTOMY Left    • OTHER      right hand finger   • OTHER ORTHOPEDIC SURGERY      back sx x3       Current Outpatient Medications:  Home Medications     Reviewed by Ab Giraldo M.D. (Resident) on 12/26/18 at 2105  Med List Status: Complete   Medication Last Dose Status   amLODIPine (NORVASC) 10 MG Tab 12/25/2018 Active   aspirin EC (ECOTRIN) 81 MG Tablet Delayed Response 12/25/2018 Active   atorvastatin (LIPITOR) 80 MG tablet 12/25/2018 Active   cyanocobalamin (VITAMIN B-12) 100 MCG Tab unknown Active   hydroCHLOROthiazide (HYDRODIURIL) 12.5 MG tablet unknown Active   losartan (COZAAR) 100 MG Tab unknown Active   tamsulosin (FLOMAX) 0.4 MG capsule unknown Active   vitamin D (CHOLECALCIFEROL) 1000 UNIT Tab 12/25/2018 Active                Medication Allergy/Sensitivities:  No Known Allergies      Family History (mandatory)   Family History   Problem Relation Age of Onset   • No Known Problems Mother    • No Known Problems Father    • Cancer Neg Hx    • Heart Disease Neg Hx        Social History (mandatory)   Social History          Social History   • Marital status:      Spouse name: N/A   • Number of children: N/A   • Years of education: N/A     Occupational History   • Not on file.     Social History Main Topics   • Smoking status: Current Every Day Smoker     Packs/day: 0.50     Years: 60.00     Types: Cigarettes     Start date: 7/2/1950   • Smokeless tobacco: Never Used   • Alcohol use Yes      Comment: very seldom   • Drug use: No   • Sexual activity: Not on file     Other Topics Concern   • Not on file     Social History Narrative   • No narrative on file     Living situation: Lives in [place] with his wife. independent for ADLs. Questionable for iADLS  PCP : No primary care provider on file.    Physical Exam     Vitals:    12/26/18 1735   BP: 151/83   Pulse: 68   Resp: 16   Temp: 36.4 °C (97.6 °F)   TempSrc: Temporal   SpO2: 93%     There is no height or weight on file to calculate BMI.  /83   Pulse 68   Temp 36.4 °C (97.6 °F) (Temporal)   Resp 16   SpO2 93%   O2 therapy: Pulse Oximetry: 93 %, O2 (LPM): 0, O2 Delivery: None (Room Air)    Physical Exam   Constitutional: He is oriented to person, place, and time and well-developed, well-nourished, and in no distress.   HENT:   Head: Normocephalic and atraumatic.   Right Ear: External ear normal.   Left Ear: External ear normal.   Mouth/Throat: Oropharynx is clear and moist.   Eyes: Pupils are equal, round, and reactive to light. Conjunctivae and EOM are normal. Right eye exhibits no discharge. Left eye exhibits no discharge. No scleral icterus.   Bilateral ectropion.   Neck: Normal range of motion. Neck supple. No tracheal deviation present. No thyromegaly present.   Cardiovascular: Normal rate, regular rhythm, normal heart sounds and intact distal pulses.  Exam reveals no friction rub.    Pulmonary/Chest: Effort normal. No stridor. No respiratory distress. He has no wheezes. He has no rales. He exhibits no tenderness.   Diminished air entry on the left.   Abdominal:  Soft. Bowel sounds are normal. He exhibits no distension and no mass. There is no tenderness. There is no rebound and no guarding.   Musculoskeletal: Normal range of motion. He exhibits no edema, tenderness or deformity.   Lymphadenopathy:     He has no cervical adenopathy.   Neurological: He is alert and oriented to person, place, and time. He has normal reflexes. He displays normal reflexes. No cranial nerve deficit. He exhibits normal muscle tone. Gait normal. Coordination normal. GCS score is 15.   NIHSS score 1.     Right-sided lower face droop.  Cranial nerves II to XII intact.  Sensation intact also.  Strength 4+/5 strength on left lower/upper extremities,   5/5 on right lower/upeer extremities.   Skin: Skin is warm and dry. No rash noted. No erythema. No pallor.   Psychiatric: Mood and affect normal.   Mini-Mental scale score 16/30 denoting moderate dementia.   Nursing note and vitals reviewed.        Data Review       Old Records Request:   Completed  Current Records review/summary: Completed    Lab Data Review:  Recent Results (from the past 24 hour(s))   EKG    Collection Time: 18  6:11 PM   Result Value Ref Range    Report       [facility]    Test Date:  2018  Pt Name:    LOVE BRAN               Department: [facility]  MRN:        [id number]                      Room:  Gender:     Male                         Technician: DENNISE  :        1940                   Requested By:ENIO MATUTE  Order #:    [id number]                    Reading MD:    Measurements  Intervals                                Axis  Rate:       86                           P:          42  NM:         172                          QRS:        29  QRSD:       88                           T:          73  QT:         384  QTc:        460    Interpretive Statements  SINUS RHYTHM  MULTIPLE VENTRICULAR PREMATURE COMPLEXES  PROBABLE LEFT ATRIAL ABNORMALITY  MINIMAL ST DEPRESSION, INFERIOR LEADS  No previous ECG available  for comparison     CBC WITH DIFFERENTIAL    Collection Time: 12/26/18  6:49 PM   Result Value Ref Range    WBC 11.1 (H) 4.8 - 10.8 K/uL    RBC 5.82 4.70 - 6.10 M/uL    Hemoglobin 19.3 (H) 14.0 - 18.0 g/dL    Hematocrit 55.9 (H) 42.0 - 52.0 %    MCV 96.0 81.4 - 97.8 fL    MCH 33.2 (H) 27.0 - 33.0 pg    MCHC 34.5 33.7 - 35.3 g/dL    RDW 61.4 (H) 35.9 - 50.0 fL    Platelet Count 209 164 - 446 K/uL    MPV 10.3 9.0 - 12.9 fL    Neutrophils-Polys 91.10 (H) 44.00 - 72.00 %    Lymphocytes 2.50 (L) 22.00 - 41.00 %    Monocytes 4.30 0.00 - 13.40 %    Eosinophils 0.30 0.00 - 6.90 %    Basophils 0.40 0.00 - 1.80 %    Immature Granulocytes 1.40 (H) 0.00 - 0.90 %    Nucleated RBC 0.00 /100 WBC    Neutrophils (Absolute) 10.13 (H) 1.82 - 7.42 K/uL    Lymphs (Absolute) 0.28 (L) 1.00 - 4.80 K/uL    Monos (Absolute) 0.48 0.00 - 0.85 K/uL    Eos (Absolute) 0.03 0.00 - 0.51 K/uL    Baso (Absolute) 0.05 0.00 - 0.12 K/uL    Immature Granulocytes (abs) 0.16 (H) 0.00 - 0.11 K/uL    NRBC (Absolute) 0.00 K/uL       Imaging/Procedures Review:    Independant Imaging Review: Completed  No orders to display            EKG:   EKG Independant Review: Completed  QTc:460, HR: 86, Normal Sinus Rhythm, with multiple PVCs, no ST changes   Records reviewed and summarized in current documentation :  Yes  [employer] teaching service handout given to patient:  No         Assessment/Plan     * Cerebrovascular accident (CVA) due to embolism (HCC)- (present on admission)   Assessment & Plan    Prior history of CVA 6/30/18, without residual deficit.  Now presenting with transient dysarthria, dizziness, and bilateral lower extremity weakness.  NIHSS score 1, for mild paralysis of right lower face.  He took aspirin 162 mg and atorvastatin 40 mg on the morning of 12/26.    -Admit to telemetry/neurology  -Q 4 hours neurochecks  -Seizure/aspiration/fall precautions  -NPO for now, pending SLP evaluation  -Neurology consulted: Appreciate his recommendations.  -Start Plavix  75 mg daily.  First dose now.  -Atorvastatin 40 mg now  -Atorvastatin 80 mg daily starting from tomorrow  -Hold antihypertensives for permissive hypertension  -Consider echocardiography  -CTA head and neck pending  -PT/OT consult pending  -PM&R consult pending     HTN (hypertension)- (present on admission)   Assessment & Plan    BP on admission, 151/83.  On home amlodipine 5 mg daily, hydrochlorothiazide 25 mg daily, losartan 50 mg daily.      -Holding home antihypertensives for permissive hypertension in this patient with CVA/TIA  -Continue to monitor     Dementia- (present on admission)   Assessment & Plan    Mini-Mental state score 16/30 denoting moderate dementia.  Unable to assess with MoCA, as patient had extreme difficulty understanding the rubric of the test.  Highest level of education ninth grade.     -Recommend formal evaluation for dementia  -B12/folate level pending  -TSH pending       Leukocytosis- (present on admission)   Assessment & Plan    BC elevated to 11.1 on admission with left shift.  Likely stress response.  Patient afebrile, no tachycardia, no tachypnea SIRS 1/4.     -Continue to monitor     BPH (benign prostatic hyperplasia)- (present on admission)   Assessment & Plan    Stable.  Denies LUTs.     -Resume tamsulosin 0.4 mg nightly after period of permissive hypertension  -Continue to monitor     Elevated hematocrit- (present on admission)   Assessment & Plan    Patient 19.3/55.9%, RDW elevated at 61.4, MCH elevated at 33.2 on admission.  Normal RBC, platelet count.  MCV does not show macrocytosis. Patient's renal function does not show dehydration.  He does not look dry on exam.  There is concerning for chronic hypoxia.       -Continue treatment for COPD  -B12/folate pending  -Continue to monitor       Hyperglycemia- (present on admission)   Assessment & Plan    Mildly elevated at 120 on admission.  No history of diabetes.  Likely stress response.    -A1c pending  -NPO pending SLP  evaluation  -Continue normal saline  -Continue to monitor         Hyponatremia- (present on admission)   Assessment & Plan    Sodium 130 on admission.  Patient asymptomatic.     -NPO pending SLP eval  -Normal saline @ 83ml/hr  -Continue to monitor     COPD (chronic obstructive pulmonary disease) (HCC)- (present on admission)   Assessment & Plan    Patient denies having a history of COPD.  Was on problem list on discharge summary notes that he was on ipratropium/albuterol.  Patient denies use of inhalers or mental oxygen.  No hypoxia on room air.    -Supplemental on oxygen as needed   -RT protocol  -Continue to monitor         Anticipated Hospital stay:  >2 midnights        Quality Measures  Quality-Core Measures   Reviewed items::  EKG reviewed, Labs reviewed, Medications reviewed and Radiology images reviewed  Matt catheter::  No Matt  DVT prophylaxis - mechanical:  SCDs    PCP: No primary care provider on file.

## 2018-12-28 VITALS
RESPIRATION RATE: 16 BRPM | HEART RATE: 66 BPM | SYSTOLIC BLOOD PRESSURE: 168 MMHG | BODY MASS INDEX: 25.19 KG/M2 | TEMPERATURE: 97 F | DIASTOLIC BLOOD PRESSURE: 79 MMHG | WEIGHT: 160.5 LBS | HEIGHT: 67 IN | OXYGEN SATURATION: 96 %

## 2018-12-28 LAB
ALBUMIN SERPL BCP-MCNC: 3.4 G/DL (ref 3.2–4.9)
ALBUMIN/GLOB SERPL: 1.5 G/DL
ALP SERPL-CCNC: 40 U/L (ref 30–99)
ALT SERPL-CCNC: 10 U/L (ref 2–50)
ANION GAP SERPL CALC-SCNC: 6 MMOL/L (ref 0–11.9)
AST SERPL-CCNC: 22 U/L (ref 12–45)
BASOPHILS # BLD AUTO: 0.7 % (ref 0–1.8)
BASOPHILS # BLD: 0.05 K/UL (ref 0–0.12)
BILIRUB SERPL-MCNC: 0.7 MG/DL (ref 0.1–1.5)
BUN SERPL-MCNC: 16 MG/DL (ref 8–22)
CALCIUM SERPL-MCNC: 8.1 MG/DL (ref 8.5–10.5)
CHLORIDE SERPL-SCNC: 106 MMOL/L (ref 96–112)
CO2 SERPL-SCNC: 19 MMOL/L (ref 20–33)
CREAT SERPL-MCNC: 0.98 MG/DL (ref 0.5–1.4)
EOSINOPHIL # BLD AUTO: 0.15 K/UL (ref 0–0.51)
EOSINOPHIL NFR BLD: 2.2 % (ref 0–6.9)
ERYTHROCYTE [DISTWIDTH] IN BLOOD BY AUTOMATED COUNT: 61.4 FL (ref 35.9–50)
GLOBULIN SER CALC-MCNC: 2.2 G/DL (ref 1.9–3.5)
GLUCOSE SERPL-MCNC: 91 MG/DL (ref 65–99)
HCT VFR BLD AUTO: 47.1 % (ref 42–52)
HGB BLD-MCNC: 15.8 G/DL (ref 14–18)
IMM GRANULOCYTES # BLD AUTO: 0.1 K/UL (ref 0–0.11)
IMM GRANULOCYTES NFR BLD AUTO: 1.4 % (ref 0–0.9)
LYMPHOCYTES # BLD AUTO: 1.51 K/UL (ref 1–4.8)
LYMPHOCYTES NFR BLD: 21.8 % (ref 22–41)
MAGNESIUM SERPL-MCNC: 2 MG/DL (ref 1.5–2.5)
MCH RBC QN AUTO: 32.6 PG (ref 27–33)
MCHC RBC AUTO-ENTMCNC: 33.5 G/DL (ref 33.7–35.3)
MCV RBC AUTO: 97.1 FL (ref 81.4–97.8)
MONOCYTES # BLD AUTO: 0.96 K/UL (ref 0–0.85)
MONOCYTES NFR BLD AUTO: 13.9 % (ref 0–13.4)
NEUTROPHILS # BLD AUTO: 4.16 K/UL (ref 1.82–7.42)
NEUTROPHILS NFR BLD: 60 % (ref 44–72)
NRBC # BLD AUTO: 0 K/UL
NRBC BLD-RTO: 0 /100 WBC
PHOSPHATE SERPL-MCNC: 2.2 MG/DL (ref 2.5–4.5)
PLATELET # BLD AUTO: 168 K/UL (ref 164–446)
PMV BLD AUTO: 10.9 FL (ref 9–12.9)
POTASSIUM SERPL-SCNC: 3.5 MMOL/L (ref 3.6–5.5)
PROT SERPL-MCNC: 5.6 G/DL (ref 6–8.2)
RBC # BLD AUTO: 4.85 M/UL (ref 4.7–6.1)
SODIUM SERPL-SCNC: 131 MMOL/L (ref 135–145)
SODIUM SERPL-SCNC: 132 MMOL/L (ref 135–145)
WBC # BLD AUTO: 6.9 K/UL (ref 4.8–10.8)

## 2018-12-28 PROCEDURE — 84100 ASSAY OF PHOSPHORUS: CPT

## 2018-12-28 PROCEDURE — 92526 ORAL FUNCTION THERAPY: CPT

## 2018-12-28 PROCEDURE — 99239 HOSP IP/OBS DSCHRG MGMT >30: CPT | Mod: GC | Performed by: INTERNAL MEDICINE

## 2018-12-28 PROCEDURE — 700102 HCHG RX REV CODE 250 W/ 637 OVERRIDE(OP): Performed by: STUDENT IN AN ORGANIZED HEALTH CARE EDUCATION/TRAINING PROGRAM

## 2018-12-28 PROCEDURE — 85025 COMPLETE CBC W/AUTO DIFF WBC: CPT

## 2018-12-28 PROCEDURE — 84295 ASSAY OF SERUM SODIUM: CPT

## 2018-12-28 PROCEDURE — A9270 NON-COVERED ITEM OR SERVICE: HCPCS | Performed by: STUDENT IN AN ORGANIZED HEALTH CARE EDUCATION/TRAINING PROGRAM

## 2018-12-28 PROCEDURE — 36415 COLL VENOUS BLD VENIPUNCTURE: CPT

## 2018-12-28 PROCEDURE — 80053 COMPREHEN METABOLIC PANEL: CPT

## 2018-12-28 PROCEDURE — 83735 ASSAY OF MAGNESIUM: CPT

## 2018-12-28 RX ORDER — ASPIRIN 81 MG/1
162 TABLET, CHEWABLE ORAL DAILY
Qty: 180 TAB | Refills: 0 | Status: SHIPPED | OUTPATIENT
Start: 2018-12-29 | End: 2020-09-07

## 2018-12-28 RX ORDER — CLOPIDOGREL BISULFATE 75 MG/1
75 TABLET ORAL DAILY
Qty: 30 TAB | Refills: 0 | Status: ON HOLD | OUTPATIENT
Start: 2018-12-29 | End: 2020-09-08

## 2018-12-28 RX ORDER — POTASSIUM CHLORIDE 20 MEQ/1
40 TABLET, EXTENDED RELEASE ORAL ONCE
Status: COMPLETED | OUTPATIENT
Start: 2018-12-28 | End: 2018-12-28

## 2018-12-28 RX ADMIN — LOSARTAN POTASSIUM 100 MG: 50 TABLET ORAL at 05:43

## 2018-12-28 RX ADMIN — DIBASIC SODIUM PHOSPHATE, MONOBASIC POTASSIUM PHOSPHATE AND MONOBASIC SODIUM PHOSPHATE 1 TABLET: 852; 155; 130 TABLET ORAL at 08:12

## 2018-12-28 RX ADMIN — POTASSIUM CHLORIDE 40 MEQ: 1500 TABLET, EXTENDED RELEASE ORAL at 08:12

## 2018-12-28 RX ADMIN — CLOPIDOGREL 75 MG: 75 TABLET, FILM COATED ORAL at 05:43

## 2018-12-28 RX ADMIN — ASPIRIN 162 MG: 81 TABLET, CHEWABLE ORAL at 05:43

## 2018-12-28 ASSESSMENT — PAIN SCALES - GENERAL
PAINLEVEL_OUTOF10: 0
PAINLEVEL_OUTOF10: 0

## 2018-12-28 NOTE — DISCHARGE INSTRUCTIONS
Discharge Instructions    Discharged to home by car with relative. Discharged via wheelchair, hospital escort: Yes.  Special equipment needed: Not Applicable    Be sure to schedule a follow-up appointment with your primary care doctor or any specialists as instructed.     Discharge Plan:   Pneumococcal Vaccine Administered/Refused: Not given - Patient refused pneumococcal vaccine  Influenza Vaccine Indication: Patient Refuses    I understand that a diet low in cholesterol, fat, and sodium is recommended for good health. Unless I have been given specific instructions below for another diet, I accept this instruction as my diet prescription.   Other diet: chopped/pureed foods, thin liquids - follow instructions provided by Speech Therapy    Special Instructions:     Stroke/CVA/TIA/Hemorrhagic Ischemia Discharge Instructions  You have had a stroke. Your risk factors have been identified as follows:  Age - Over 55  It is important that you reduce your risk factors to avoid another stroke in the future. Here are some general guidelines to follow:  · Eat healthy - avoid food high in fat.  · Get regular exercise.  · Maintain a healthy weight.  · Avoid smoking.  · Avoid alcohol and illegal drug use.  · Take your medications as directed.  For more information regarding risk factors, refer to pages 17-19 in your Stroke Patient Education Guide. Stroke Education Guide was given to patient.    Warning signs of a stroke include (which can also be found on page 3 of your Stroke Patient Education Guide):  · Sudden numbness of weakness of the face, arm or leg (especially on one side of the body).  · Sudden confusion, trouble speaking or understanding.  · Sudden trouble seeing in one or both eyes.  · Sudden trouble walking, dizziness, loss of balance or coordination.  · Sudden severe headache with no known cause.  It is very important to get treatment quickly when a stroke occurs. If you experience any of the above warning signs,  call [phone] immediately.     Some patients who have had a stroke will be going home on a blood thinner medication called Warfarin (Coumadin).  This medication requires very close monitoring and follow up.  This follow up can be provided by either your Primary Care Physician or by [facility]'s Outpatient Anticoagulation Service.  The Outpatient Anticoagulation Service is located at the [place] [facility] at [facility] ([facility]).  If you do not know when your follow up appointment is scheduled, call [phone] to verify your appointment time.      · Is patient discharged on Warfarin / Coumadin?   No     Depression / Suicide Risk    As you are discharged from this [facility], it is important to learn how to keep safe from harming yourself.    Recognize the warning signs:  · Abrupt changes in personality, positive or negative- including increase in energy   · Giving away possessions  · Change in eating patterns- significant weight changes-  positive or negative  · Change in sleeping patterns- unable to sleep or sleeping all the time   · Unwillingness or inability to communicate  · Depression  · Unusual sadness, discouragement and loneliness  · Talk of wanting to die  · Neglect of personal appearance   · Rebelliousness- reckless behavior  · Withdrawal from people/activities they love  · Confusion- inability to concentrate     If you or a loved one observes any of these behaviors or has concerns about self-harm, here's what you can do:  · Talk about it- your feelings and reasons for harming yourself  · Remove any means that you might use to hurt yourself (examples: pills, rope, extension cords, firearm)  · Get professional help from the community (Mental Health, Substance Abuse, psychological counseling)  · Do not be alone:Call your Safe Contact- someone whom you trust who will be there for you.  · Call your local CRISIS HOTLINE 940-1256 or 800-213-0434  · Call your  local Children's Mobile Crisis Response Team [place] (439) 936-6095 or www.[employer]per[employer]  · Call the toll free National Suicide Prevention Hotlines   · National Suicide Prevention Lifeline 508-617-XELA (1232)  · National Hope Line Network 800-SUICIDE (458-9917)

## 2018-12-28 NOTE — FACE TO FACE
Face to Face Supporting Documentation - Home Health    The encounter with this patient was in whole or in part the primary reason for home health admission.    Date of encounter:   Patient:                    MRN:                       YOB: 2020  Bg Andrea  [id number]  1940     Home health to see patient for:  Physical Therapy evaluation and treatment, Occupational therapy evaluation and treatment and Speech Language Pathology evaluation and treatment, Skilled Nursing Care for assessment, interventions and education    Skilled need for:  New Onset Medical Diagnosis acute stroke and Medication Management acute stroke    Skilled nursing interventions to include:  Comment: Continued PT, OT and additional vision and visual motor assessment    Homebound status evidenced by:  Require the use of special transportation. Leaving home requires a considerable and taxing effort. There is a normal inability to leave the home.    Community Physician to provide follow up care: No primary care provider on file.     Optional Interventions? No      I certify the face to face encounter for this home health care referral meets the CMS requirements and the encounter/clinical assessment with the patient was, in whole, or in part, for the medical condition(s) listed above, which is the primary reason for home health care. Based on my clinical findings: the service(s) are medically necessary, support the need for home health care, and the homebound criteria are met.  I certify that this patient has had a face to face encounter by myself.  Theo Steward M.D. - NPI: [id number]

## 2018-12-28 NOTE — PROGRESS NOTES
During patient education regarding new medications, patient stated 'I absolutely have to go home today!  If they try to keep me here, I will walk out by myself.'  Therapeutic communication methods used to understand the nature of patient's feelings.  He stated that he has to go home and take care of his wife, who has COPD and has trouble with daily activities.  When asked if there were any friends or family that could help, patient began crying and said, 'no one else gives a damn about us'.  Comfort measures provided.  He is resting in his chair at the time of this note with call light in reach, chair alarm on for safety, instructions given to wait for staff assistance with needs.  ST speaking to patient at the time of this note.

## 2018-12-28 NOTE — PROGRESS NOTES
Monitor Summary: SR 70-80, CT .20, QRS .08, QT .36 with frequent PVC, bigeminy, trigeminy per strip from monitor room

## 2018-12-28 NOTE — DISCHARGE PLANNING
Received Choice form at 1400  Agency/Facility Name: [facility]  Referral sent per Choice form @ [id number]

## 2018-12-28 NOTE — THERAPY
"Speech Language Therapy dysphagia treatment completed.   Functional Status:  Pt seen this date for dysphagia tx. Pt was seen sitting in chair, he was awake, alert and cooperative throughout session. Of note, pt is emotional about getting home to his wife. PO trials of ice, dysphagia 2 and thins completed. Vocal quality remained clear throughout session, no s/sx of aspiration appreciated with all consistencies consumed. Min oral residue with cereal cleared with liquid wash. Pt educated on swallow strategies and counseled on foods to avoid after discharge. Pt verbalized understanding of swallow strategies and recommendations. SLP following.   Recommendations: Upgrade to Dysphagia II and thins liquids with adherence to swallow strategies (alterntae sips/bites, masticate thoroughly, avoid dry foods)   Plan of Care: Will benefit from Speech Therapy 3 times per week  Post-Acute Therapy: Recommend outpatient or home health transitional care services for continued speech therapy services      See \"Rehab Therapy-Acute\" Patient Summary Report for complete documentation.     "

## 2018-12-28 NOTE — DISCHARGE PLANNING
Follow up for rehab chart review therapy notes indicate limited need for post acute therapy. Anticipate hoe with home health versus skilled nursing when medically cleared. No physiatry consult ordered per protocol. CM/SW notified.

## 2018-12-28 NOTE — RESPIRATORY CARE
COPD EDUCATION by COPD CLINICAL EDUCATOR  12/28/2018 at 6:21 AM by Briseida Christine     Patient reviewed by COPD education team. Patient does not qualify for COPD program.

## 2018-12-28 NOTE — DISCHARGE PLANNING
Agency/Facility Name: [facility]  Spoke To: Kirstie  Outcome: Received voicemail from Kirstie requesting PCP information. [facility]'s [facility]. [facility] ) notified via phone. Referral still being processed.

## 2018-12-29 NOTE — DISCHARGE PLANNING
Agency/Facility Name: [facility]  Spoke To: Diego  Outcome: Referral still being processed. Lisa(LSW) notified.

## 2018-12-29 NOTE — DISCHARGE SUMMARY
Internal Medicine Discharge Summary  Note Author: Theo Steward M.D.       Name Bg Andrea       1940   Age/Sex 78 y.o. male   MRN [id number]         Admit Date:  2018       Discharge Date:   2018    Service:   [facility] Internal Medicine Green Team  Attending Physician(s):   Dr. Gregorio    Senior Resident(s):   Dr. Edwards  Nicola Resident(s):   Dr. Steward  PCP: No primary care provider on file.      Primary Diagnosis:   Cerebrovascular accident    Secondary Diagnoses:                Principal Problem:    Cerebrovascular accident (CVA) due to embolism (HCC) POA: Yes  Active Problems:    HTN (hypertension) POA: Yes    Dementia POA: Yes    COPD (chronic obstructive pulmonary disease) (HCC) POA: Yes    Hyponatremia POA: Yes    Hyperglycemia POA: Yes    Elevated hematocrit POA: Yes    BPH (benign prostatic hyperplasia) POA: Yes    Leukocytosis POA: Yes  Resolved Problems:    * No resolved hospital problems. *      Hospital Summary (Brief Narrative):       Mr Andrea is a 78 year old male with past medical history significant for hypertension, COPD, cerebrovascular accident in 2018 [facility] Radiology was consulted in 2018 and patient underwent IR - Carotid angiogram and they did not find any evidence of stenosis at that time.  This time [facility] in legs and slurred speech. [facility]oms [facility]. [facility] showed a new ischemic stroke in the right periventricular white matter ,  interval complete occlusion of Right ICA into the right M1 segment, diminished M2 and M3 segments. [facility] for possible IR intervention. 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  Neurology and IR were consulted. Patient examined and images were reviewed by  Neurologist. Per their recommendations, patient was started on aspirin 162mg,  plavix 75mg and atorvastatin 80mg; no further emergent neurological intervention needed at this time and he was cleared for discharge. He was advised to [facility].   Patient was advised to wait till IR gave their recommendations but refused to stay and wanted to be discharged today. He will be discharged on home health per PT/OT recommendations.    Patient /Hospital Summary (Details -- Problem Oriented) :          HTN (hypertension)   Assessment & Plan    - /93  - On home amlodipine, hydrochlorothiazide, losartan  - Home antihypertensives were held for permissive hypertension in this patient with CVA/TIA    Plan:  - Restarting losartan to maintain blood pressure per neurology recs  - Continue to monitor     * Cerebrovascular accident (CVA) due to embolism (HCC)   Assessment & Plan    - No symptoms today  - New ischemic stroke is also embolic and tiny in the R periventricular white matter based on Neurologist's review of the [facility] brain [facility]  - Past stroke about 6 months before was ischemic and embolic in the R posterior frontoparietal region  - Likely etiology of both strokes is 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 at its origin seen on CT  - Moderate stenosis was also found diffusely elsewhere most likely due to his smoking since his LDL 86    Plan:  - Continue Q 4 hours neurochecks and vitals  - Seizure/aspiration/fall precautions  - Aspirin 162 mg daily for 90 days  - Plavix 75 mg daily  - Atorvastatin 80 mg daily  - Maintain BP < 140/90     Dementia   Assessment & Plan    - On admission, mini-mental state score 16/30 denoting moderate dementia     Plan:  - Recommend formal evaluation for dementia  - B12/folate level pending  - TSH pending     Leukocytosis   Assessment & Plan    WBC elevated to 11.1 on admission with left shift.  Likely stress response.  Patient  afebrile, no tachycardia, no tachypnea SIRS 1/4.     Plan:  -Continue to monitor     BPH (benign prostatic hyperplasia)   Assessment & Plan    Stable.  Denies LUTs.     Plan:  - Resume tamsulosin 0.4 mg nightly after period of permissive hypertension  - Continue to monitor     Elevated hematocrit   Assessment & Plan    - Patient 19.3/55.9%, RDW elevated at 61.4, MCH elevated at 33.2 on admission.  Normal RBC, platelet count.  MCV does not show macrocytosis. Patient's renal function does not show dehydration.  He does not look dry on exam.  This is concerning for chronic hypoxia.   - B12 and folate within the reference ranges    Plan:  - Continue treatment for COPD  - Continue to monitor     Hyperglycemia   Assessment & Plan    - Mildly elevated at 120 on admission.  No history of diabetes.  Likely stress response.  - HbA1c 5.5    Plan:   - IVF  - Continue to monitor     Hyponatremia   Assessment & Plan    - Patient asymptomatic  - Seems to be chronic since 6 months    Plan:  - IVF  - Continue to monitor     COPD (chronic obstructive pulmonary disease) (HCC)   Assessment & Plan    Patient denies having a history of COPD.  Was on problem list on discharge summary notes that he was on ipratropium/albuterol.  Patient denies use of inhalers or supplemental oxygen.  No hypoxia on room air.    Plan:  - Supplemental on oxygen as needed   - RT protocol  - Continue to monitor         Consultants:     Neurology    Procedures:        None    Imaging/ Testing:      CT-CTA NECK WITH & W/O-POST PROCESSING   Final Result      1.  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      2.  Atherosclerotic disease as described. Resulting stenosis elsewhere is less than 50%.      3.  Enhancing mass in the right parotid gland, possibly a pleomorphic adenoma or [place]'s tumor. Malignant tumor cannot be excluded.         Comment: Results discussed with Dr. Dixon at 12:27 AM      CT-CTA HEAD WITH & W/O-POST PROCESS   Final  Result      1.  Thrombosed distal right internal carotid artery.      2.  Right anterior and middle cerebral arteries are reconstituted via collateral flow.      3.  Atherosclerosis.      4.  Right occipital and temporal lobe encephalomalacia.      5.  Diffuse atrophy and periventricular white matter change, consistent with chronic small vessel disease.          Discharge Medications:         Medication Reconciliation: Completed       Medication List      START taking these medications      Instructions   aspirin 81 MG Chew chewable tablet  Start taking on:  12/29/2018  Commonly known as:  ASA  Replaces:  aspirin EC 81 MG Tbec   Take 2 Tabs by mouth every day.  Dose:  162 mg     clopidogrel 75 MG Tabs  Start taking on:  12/29/2018  Commonly known as:  PLAVIX   Take 1 Tab by mouth every day.  Dose:  75 mg        CONTINUE taking these medications      Instructions   amLODIPine 10 MG Tabs  Commonly known as:  NORVASC   Take 1 Tab by mouth every day.  Dose:  10 mg     atorvastatin 80 MG tablet  Commonly known as:  LIPITOR   Take 80 mg by mouth every evening.  Dose:  80 mg     cyanocobalamin 100 MCG Tabs  Commonly known as:  VITAMIN B-12   Take 100 mcg by mouth every day.  Dose:  100 mcg     hydroCHLOROthiazide 12.5 MG tablet  Commonly known as:  HYDRODIURIL   Take 1 Tab by mouth every day.  Dose:  12.5 mg     losartan 100 MG Tabs  Commonly known as:  COZAAR   Take 1 Tab by mouth every day.  Dose:  100 mg     tamsulosin 0.4 MG capsule  Commonly known as:  FLOMAX   Take 0.4 mg by mouth ONE-HALF HOUR AFTER BREAKFAST.  Dose:  0.4 mg     vitamin D 1000 UNIT Tabs  Commonly known as:  cholecalciferol   Take 1,000 Units by mouth every day.  Dose:  1000 Units        STOP taking these medications    aspirin EC 81 MG Tbec  Commonly known as:  ECOTRIN  Replaced by:  aspirin 81 MG Chew chewable tablet            Can use .DISCHARGEMEDSLIST if going to another facility         Disposition:   Discharge to home    Diet:    Regular    Activity:   As tolerated    Instructions:      Take aspirin, plavix and atorvastatin as prescribed.  Continue antihypertensives.  Check BP regularly and keep it less than 140/90.  [facility]  The patient was instructed to return to the ER in the event of worsening symptoms. I have counseled the patient on the importance of compliance and the patient has agreed to proceed with all medical recommendations and follow up plan indicated above.   The patient understands that all medications come with benefits and risks. Risks may include permanent injury or death and these risks can be minimized with close reassessment and monitoring.        Primary Care Provider:    No primary care provider on file.  Discharge summary faxed to primary care provider:  Deferred  Copy of discharge summary given to the patient: Deferred      Follow up appointment details :      [facility].    Pending Studies:        None    Time spent on discharge day patient visit, preparing discharge paperwork and arranging for patient follow up.    Summary of follow up issues:   Follow up [facility] in [facility].    Discharge Time (Minutes) :    45 min  Hospital Course Type:  Inpatient Stay >2 midnights      Condition on Discharge    ______________________________________________________________________    Interval history/exam for day of discharge:    Patient states he is doing well and does not have any complaints today. No weakness, pain.   On exam:  Constitutional: He is well-developed, well-nourished, and in no distress.   HENT:   Head: Normocephalic and atraumatic.   Eyes: Pupils are equal, round, and reactive to light. No scleral icterus.   Neck: Normal range of motion. Neck supple.   Cardiovascular: Normal rate, regular rhythm and normal heart sounds.    Pulmonary/Chest: Effort normal and breath sounds normal. No respiratory distress.    Abdominal: Soft. Bowel sounds are normal. He exhibits no distension. There is no tenderness.   Musculoskeletal: Normal range of motion. He exhibits no edema or tenderness.   Neurological: He is alert. He has normal reflexes. No cranial nerve deficit. He exhibits normal muscle tone. Coordination normal.   Skin: Skin is warm. No erythema.   Psychiatric: Mood and affect normal.     Most Recent Labs:    Lab Results   Component Value Date/Time    WBC 6.9 12/28/2018 12:20 AM    RBC 4.85 12/28/2018 12:20 AM    HEMOGLOBIN 15.8 12/28/2018 12:20 AM    HEMATOCRIT 47.1 12/28/2018 12:20 AM    MCV 97.1 12/28/2018 12:20 AM    MCH 32.6 12/28/2018 12:20 AM    MCHC 33.5 (L) 12/28/2018 12:20 AM    MPV 10.9 12/28/2018 12:20 AM    NEUTSPOLYS 60.00 12/28/2018 12:20 AM    LYMPHOCYTES 21.80 (L) 12/28/2018 12:20 AM    MONOCYTES 13.90 (H) 12/28/2018 12:20 AM    EOSINOPHILS 2.20 12/28/2018 12:20 AM    BASOPHILS 0.70 12/28/2018 12:20 AM      Lab Results   Component Value Date/Time    SODIUM 132 (L) 12/28/2018 06:12 AM    POTASSIUM 3.5 (L) 12/28/2018 12:20 AM    CHLORIDE 106 12/28/2018 12:20 AM    CO2 19 (L) 12/28/2018 12:20 AM    GLUCOSE 91 12/28/2018 12:20 AM    BUN 16 12/28/2018 12:20 AM    CREATININE 0.98 12/28/2018 12:20 AM      Lab Results   Component Value Date/Time    ALTSGPT 10 12/28/2018 12:20 AM    ASTSGOT 22 12/28/2018 12:20 AM    ALKPHOSPHAT 40 12/28/2018 12:20 AM    TBILIRUBIN 0.7 12/28/2018 12:20 AM    ALBUMIN 3.4 12/28/2018 12:20 AM    GLOBULIN 2.2 12/28/2018 12:20 AM    INR 1.11 07/04/2018 05:58 PM     Lab Results   Component Value Date/Time    PROTHROMBTM 14.0 07/04/2018 05:58 PM    INR 1.11 07/04/2018 05:58 PM

## 2018-12-30 LAB — MAGNESIUM SERPL-MCNC: 2 MG/DL (ref 1.5–2.5)

## 2020-09-07 ENCOUNTER — APPOINTMENT (OUTPATIENT)
Dept: CARDIOLOGY | Facility: MEDICAL CENTER | Age: 80
DRG: 247 | End: 2020-09-07
Attending: INTERNAL MEDICINE
Payer: COMMERCIAL

## 2020-09-07 ENCOUNTER — APPOINTMENT (OUTPATIENT)
Dept: RADIOLOGY | Facility: MEDICAL CENTER | Age: 80
DRG: 247 | End: 2020-09-07
Attending: INTERNAL MEDICINE
Payer: COMMERCIAL

## 2020-09-07 ENCOUNTER — HOSPITAL ENCOUNTER (INPATIENT)
Facility: MEDICAL CENTER | Age: 80
LOS: 1 days | DRG: 247 | End: 2020-09-08
Attending: EMERGENCY MEDICINE | Admitting: INTERNAL MEDICINE
Payer: COMMERCIAL

## 2020-09-07 DIAGNOSIS — I21.02 ST ELEVATION MYOCARDIAL INFARCTION INVOLVING LEFT ANTERIOR DESCENDING (LAD) CORONARY ARTERY (HCC): ICD-10-CM

## 2020-09-07 PROBLEM — R79.89 ELEVATED BRAIN NATRIURETIC PEPTIDE (BNP) LEVEL: Status: ACTIVE | Noted: 2020-09-07

## 2020-09-07 LAB
ALBUMIN SERPL BCP-MCNC: 3.8 G/DL (ref 3.2–4.9)
ALBUMIN/GLOB SERPL: 1.6 G/DL
ALP SERPL-CCNC: 74 U/L (ref 30–99)
ALT SERPL-CCNC: 8 U/L (ref 2–50)
ANION GAP SERPL CALC-SCNC: 16 MMOL/L (ref 7–16)
APTT PPP: 25.7 SEC (ref 24.7–36)
AST SERPL-CCNC: 12 U/L (ref 12–45)
BASOPHILS # BLD AUTO: 2 % (ref 0–1.8)
BASOPHILS # BLD: 0.19 K/UL (ref 0–0.12)
BILIRUB SERPL-MCNC: 0.6 MG/DL (ref 0.1–1.5)
BUN SERPL-MCNC: 5 MG/DL (ref 8–22)
CALCIUM SERPL-MCNC: 8.9 MG/DL (ref 8.5–10.5)
CHLORIDE SERPL-SCNC: 95 MMOL/L (ref 96–112)
CO2 SERPL-SCNC: 20 MMOL/L (ref 20–33)
CREAT SERPL-MCNC: 1.03 MG/DL (ref 0.5–1.4)
EKG IMPRESSION: NORMAL
EKG IMPRESSION: NORMAL
EOSINOPHIL # BLD AUTO: 0.36 K/UL (ref 0–0.51)
EOSINOPHIL NFR BLD: 3.7 % (ref 0–6.9)
ERYTHROCYTE [DISTWIDTH] IN BLOOD BY AUTOMATED COUNT: 59.9 FL (ref 35.9–50)
GLOBULIN SER CALC-MCNC: 2.4 G/DL (ref 1.9–3.5)
GLUCOSE SERPL-MCNC: 211 MG/DL (ref 65–99)
HCT VFR BLD AUTO: 49.6 % (ref 42–52)
HGB BLD-MCNC: 15.1 G/DL (ref 14–18)
IMM GRANULOCYTES # BLD AUTO: 0.08 K/UL (ref 0–0.11)
IMM GRANULOCYTES NFR BLD AUTO: 0.8 % (ref 0–0.9)
INR PPP: 1.03 (ref 0.87–1.13)
LIPASE SERPL-CCNC: 17 U/L (ref 11–82)
LV EJECT FRACT  99904: 65
LV EJECT FRACT MOD 2C 99903: 62.57
LV EJECT FRACT MOD 4C 99902: 64.63
LV EJECT FRACT MOD BP 99901: 62.89
LYMPHOCYTES # BLD AUTO: 1.31 K/UL (ref 1–4.8)
LYMPHOCYTES NFR BLD: 13.6 % (ref 22–41)
MCH RBC QN AUTO: 26.1 PG (ref 27–33)
MCHC RBC AUTO-ENTMCNC: 30.4 G/DL (ref 33.7–35.3)
MCV RBC AUTO: 85.8 FL (ref 81.4–97.8)
MONOCYTES # BLD AUTO: 0.76 K/UL (ref 0–0.85)
MONOCYTES NFR BLD AUTO: 7.9 % (ref 0–13.4)
NEUTROPHILS # BLD AUTO: 6.94 K/UL (ref 1.82–7.42)
NEUTROPHILS NFR BLD: 72 % (ref 44–72)
NRBC # BLD AUTO: 0 K/UL
NRBC BLD-RTO: 0 /100 WBC
NT-PROBNP SERPL IA-MCNC: 882 PG/ML (ref 0–125)
PLATELET # BLD AUTO: 284 K/UL (ref 164–446)
PMV BLD AUTO: 10.6 FL (ref 9–12.9)
POTASSIUM SERPL-SCNC: 4.2 MMOL/L (ref 3.6–5.5)
PROT SERPL-MCNC: 6.2 G/DL (ref 6–8.2)
PROTHROMBIN TIME: 13.8 SEC (ref 12–14.6)
RBC # BLD AUTO: 5.78 M/UL (ref 4.7–6.1)
SODIUM SERPL-SCNC: 131 MMOL/L (ref 135–145)
TROPONIN T SERPL-MCNC: 34 NG/L (ref 6–19)
WBC # BLD AUTO: 9.6 K/UL (ref 4.8–10.8)

## 2020-09-07 PROCEDURE — 85025 COMPLETE CBC W/AUTO DIFF WBC: CPT

## 2020-09-07 PROCEDURE — 700102 HCHG RX REV CODE 250 W/ 637 OVERRIDE(OP): Performed by: INTERNAL MEDICINE

## 2020-09-07 PROCEDURE — 83880 ASSAY OF NATRIURETIC PEPTIDE: CPT

## 2020-09-07 PROCEDURE — 700101 HCHG RX REV CODE 250

## 2020-09-07 PROCEDURE — B2111ZZ FLUOROSCOPY OF MULTIPLE CORONARY ARTERIES USING LOW OSMOLAR CONTRAST: ICD-10-PCS | Performed by: INTERNAL MEDICINE

## 2020-09-07 PROCEDURE — A9270 NON-COVERED ITEM OR SERVICE: HCPCS | Performed by: INTERNAL MEDICINE

## 2020-09-07 PROCEDURE — 93458 L HRT ARTERY/VENTRICLE ANGIO: CPT | Mod: 26,59 | Performed by: INTERNAL MEDICINE

## 2020-09-07 PROCEDURE — 85610 PROTHROMBIN TIME: CPT

## 2020-09-07 PROCEDURE — 700111 HCHG RX REV CODE 636 W/ 250 OVERRIDE (IP)

## 2020-09-07 PROCEDURE — 700117 HCHG RX CONTRAST REV CODE 255: Performed by: INTERNAL MEDICINE

## 2020-09-07 PROCEDURE — 93306 TTE W/DOPPLER COMPLETE: CPT | Mod: 26 | Performed by: INTERNAL MEDICINE

## 2020-09-07 PROCEDURE — C1887 CATHETER, GUIDING: HCPCS

## 2020-09-07 PROCEDURE — 71045 X-RAY EXAM CHEST 1 VIEW: CPT

## 2020-09-07 PROCEDURE — 84484 ASSAY OF TROPONIN QUANT: CPT

## 2020-09-07 PROCEDURE — 85730 THROMBOPLASTIN TIME PARTIAL: CPT

## 2020-09-07 PROCEDURE — 92941 PRQ TRLML REVSC TOT OCCL AMI: CPT | Mod: LD | Performed by: INTERNAL MEDICINE

## 2020-09-07 PROCEDURE — A9270 NON-COVERED ITEM OR SERVICE: HCPCS

## 2020-09-07 PROCEDURE — 93005 ELECTROCARDIOGRAM TRACING: CPT | Performed by: INTERNAL MEDICINE

## 2020-09-07 PROCEDURE — 700111 HCHG RX REV CODE 636 W/ 250 OVERRIDE (IP): Performed by: INTERNAL MEDICINE

## 2020-09-07 PROCEDURE — 700102 HCHG RX REV CODE 250 W/ 637 OVERRIDE(OP)

## 2020-09-07 PROCEDURE — 99223 1ST HOSP IP/OBS HIGH 75: CPT | Performed by: INTERNAL MEDICINE

## 2020-09-07 PROCEDURE — 93010 ELECTROCARDIOGRAM REPORT: CPT | Mod: 59 | Performed by: INTERNAL MEDICINE

## 2020-09-07 PROCEDURE — 027034Z DILATION OF CORONARY ARTERY, ONE ARTERY WITH DRUG-ELUTING INTRALUMINAL DEVICE, PERCUTANEOUS APPROACH: ICD-10-PCS | Performed by: INTERNAL MEDICINE

## 2020-09-07 PROCEDURE — 83690 ASSAY OF LIPASE: CPT

## 2020-09-07 PROCEDURE — 94760 N-INVAS EAR/PLS OXIMETRY 1: CPT

## 2020-09-07 PROCEDURE — 770020 HCHG ROOM/CARE - TELE (206)

## 2020-09-07 PROCEDURE — 93005 ELECTROCARDIOGRAM TRACING: CPT | Performed by: EMERGENCY MEDICINE

## 2020-09-07 PROCEDURE — 93306 TTE W/DOPPLER COMPLETE: CPT

## 2020-09-07 PROCEDURE — 4A023N7 MEASUREMENT OF CARDIAC SAMPLING AND PRESSURE, LEFT HEART, PERCUTANEOUS APPROACH: ICD-10-PCS | Performed by: INTERNAL MEDICINE

## 2020-09-07 PROCEDURE — 99223 1ST HOSP IP/OBS HIGH 75: CPT | Mod: 25 | Performed by: INTERNAL MEDICINE

## 2020-09-07 PROCEDURE — B2151ZZ FLUOROSCOPY OF LEFT HEART USING LOW OSMOLAR CONTRAST: ICD-10-PCS | Performed by: INTERNAL MEDICINE

## 2020-09-07 PROCEDURE — 700105 HCHG RX REV CODE 258: Performed by: INTERNAL MEDICINE

## 2020-09-07 PROCEDURE — 99152 MOD SED SAME PHYS/QHP 5/>YRS: CPT | Performed by: INTERNAL MEDICINE

## 2020-09-07 PROCEDURE — 80053 COMPREHEN METABOLIC PANEL: CPT

## 2020-09-07 PROCEDURE — 99285 EMERGENCY DEPT VISIT HI MDM: CPT

## 2020-09-07 RX ORDER — MIDAZOLAM HYDROCHLORIDE 1 MG/ML
INJECTION INTRAMUSCULAR; INTRAVENOUS
Status: COMPLETED
Start: 2020-09-07 | End: 2020-09-07

## 2020-09-07 RX ORDER — LOSARTAN POTASSIUM 50 MG/1
50 TABLET ORAL DAILY
Status: DISCONTINUED | OUTPATIENT
Start: 2020-09-08 | End: 2020-09-08 | Stop reason: HOSPADM

## 2020-09-07 RX ORDER — LIDOCAINE HYDROCHLORIDE 20 MG/ML
INJECTION, SOLUTION INFILTRATION; PERINEURAL
Status: COMPLETED
Start: 2020-09-07 | End: 2020-09-07

## 2020-09-07 RX ORDER — ONDANSETRON 2 MG/ML
4 INJECTION INTRAMUSCULAR; INTRAVENOUS EVERY 4 HOURS PRN
Status: DISCONTINUED | OUTPATIENT
Start: 2020-09-07 | End: 2020-09-08 | Stop reason: HOSPADM

## 2020-09-07 RX ORDER — BISACODYL 10 MG
10 SUPPOSITORY, RECTAL RECTAL
Status: DISCONTINUED | OUTPATIENT
Start: 2020-09-07 | End: 2020-09-08 | Stop reason: HOSPADM

## 2020-09-07 RX ORDER — ACETAMINOPHEN 325 MG/1
650 TABLET ORAL EVERY 6 HOURS PRN
Status: DISCONTINUED | OUTPATIENT
Start: 2020-09-07 | End: 2020-09-08 | Stop reason: HOSPADM

## 2020-09-07 RX ORDER — SODIUM CHLORIDE 9 MG/ML
INJECTION, SOLUTION INTRAVENOUS CONTINUOUS
Status: DISPENSED | OUTPATIENT
Start: 2020-09-07 | End: 2020-09-07

## 2020-09-07 RX ORDER — ONDANSETRON 4 MG/1
4 TABLET, ORALLY DISINTEGRATING ORAL EVERY 4 HOURS PRN
Status: DISCONTINUED | OUTPATIENT
Start: 2020-09-07 | End: 2020-09-08 | Stop reason: HOSPADM

## 2020-09-07 RX ORDER — AMLODIPINE BESYLATE 5 MG/1
5 TABLET ORAL DAILY
Status: DISCONTINUED | OUTPATIENT
Start: 2020-09-08 | End: 2020-09-08 | Stop reason: HOSPADM

## 2020-09-07 RX ORDER — POLYETHYLENE GLYCOL 3350 17 G/17G
1 POWDER, FOR SOLUTION ORAL
Status: DISCONTINUED | OUTPATIENT
Start: 2020-09-07 | End: 2020-09-08 | Stop reason: HOSPADM

## 2020-09-07 RX ORDER — HEPARIN SODIUM 200 [USP'U]/100ML
INJECTION, SOLUTION INTRAVENOUS
Status: COMPLETED
Start: 2020-09-07 | End: 2020-09-07

## 2020-09-07 RX ORDER — AMOXICILLIN 250 MG
2 CAPSULE ORAL 2 TIMES DAILY
Status: DISCONTINUED | OUTPATIENT
Start: 2020-09-07 | End: 2020-09-08 | Stop reason: HOSPADM

## 2020-09-07 RX ORDER — VERAPAMIL HYDROCHLORIDE 2.5 MG/ML
INJECTION, SOLUTION INTRAVENOUS
Status: COMPLETED
Start: 2020-09-07 | End: 2020-09-07

## 2020-09-07 RX ORDER — ATORVASTATIN CALCIUM 40 MG/1
80 TABLET, FILM COATED ORAL NIGHTLY
Status: ON HOLD | COMMUNITY
End: 2020-10-23

## 2020-09-07 RX ORDER — M-VIT,TX,IRON,MINS/CALC/FOLIC 27MG-0.4MG
1 TABLET ORAL DAILY
COMMUNITY
End: 2020-10-22

## 2020-09-07 RX ORDER — AMLODIPINE BESYLATE 5 MG/1
5 TABLET ORAL DAILY
Status: ON HOLD | COMMUNITY
End: 2020-09-08 | Stop reason: SDUPTHER

## 2020-09-07 RX ORDER — HEPARIN SODIUM,PORCINE 1000/ML
VIAL (ML) INJECTION
Status: COMPLETED
Start: 2020-09-07 | End: 2020-09-07

## 2020-09-07 RX ORDER — CLOPIDOGREL BISULFATE 75 MG/1
75 TABLET ORAL DAILY
Status: DISCONTINUED | OUTPATIENT
Start: 2020-09-07 | End: 2020-09-07

## 2020-09-07 RX ORDER — LOSARTAN POTASSIUM 50 MG/1
50 TABLET ORAL DAILY
COMMUNITY

## 2020-09-07 RX ORDER — ATORVASTATIN CALCIUM 80 MG/1
80 TABLET, FILM COATED ORAL NIGHTLY
Status: DISCONTINUED | OUTPATIENT
Start: 2020-09-07 | End: 2020-09-08 | Stop reason: HOSPADM

## 2020-09-07 RX ORDER — ACETAMINOPHEN 325 MG/1
650 TABLET ORAL EVERY 6 HOURS PRN
Status: DISCONTINUED | OUTPATIENT
Start: 2020-09-07 | End: 2020-09-07

## 2020-09-07 RX ADMIN — BIVALIRUDIN 1.75 MG/KG/HR: 250 INJECTION, POWDER, LYOPHILIZED, FOR SOLUTION INTRAVENOUS at 10:29

## 2020-09-07 RX ADMIN — ATORVASTATIN CALCIUM 80 MG: 80 TABLET, FILM COATED ORAL at 21:46

## 2020-09-07 RX ADMIN — SODIUM CHLORIDE: 9 INJECTION, SOLUTION INTRAVENOUS at 12:01

## 2020-09-07 RX ADMIN — IOHEXOL 148 ML: 350 INJECTION, SOLUTION INTRAVENOUS at 11:00

## 2020-09-07 RX ADMIN — TICAGRELOR 90 MG: 90 TABLET ORAL at 21:47

## 2020-09-07 RX ADMIN — HEPARIN SODIUM: 200 INJECTION, SOLUTION INTRAVENOUS at 10:15

## 2020-09-07 RX ADMIN — HEPARIN SODIUM: 1000 INJECTION, SOLUTION INTRAVENOUS; SUBCUTANEOUS at 10:39

## 2020-09-07 RX ADMIN — VERAPAMIL HYDROCHLORIDE 2.5 MG: 2.5 INJECTION, SOLUTION INTRAVENOUS at 10:39

## 2020-09-07 RX ADMIN — LIDOCAINE HYDROCHLORIDE: 20 INJECTION, SOLUTION INFILTRATION; PERINEURAL at 10:39

## 2020-09-07 RX ADMIN — NITROGLYCERIN 10 ML: 20 INJECTION INTRAVENOUS at 10:39

## 2020-09-07 RX ADMIN — TICAGRELOR 180 MG: 90 TABLET ORAL at 10:58

## 2020-09-07 RX ADMIN — FENTANYL CITRATE 50 MCG: 50 INJECTION INTRAMUSCULAR; INTRAVENOUS at 10:40

## 2020-09-07 RX ADMIN — MIDAZOLAM HYDROCHLORIDE 2 MG: 1 INJECTION, SOLUTION INTRAMUSCULAR; INTRAVENOUS at 10:40

## 2020-09-07 ASSESSMENT — ENCOUNTER SYMPTOMS
VOMITING: 0
NECK PAIN: 0
EYE DISCHARGE: 0
WEIGHT LOSS: 0
HEARTBURN: 0
FOCAL WEAKNESS: 0
CHILLS: 0
ABDOMINAL PAIN: 0
DIZZINESS: 0
HEADACHES: 0
FEVER: 0
EYE REDNESS: 0
EYE PAIN: 0
COUGH: 0
ORTHOPNEA: 0
BLURRED VISION: 0
DIARRHEA: 0
SPUTUM PRODUCTION: 0
NAUSEA: 0
SHORTNESS OF BREATH: 0
PALPITATIONS: 0
BACK PAIN: 0
STRIDOR: 0
MYALGIAS: 0
SEIZURES: 0

## 2020-09-07 ASSESSMENT — LIFESTYLE VARIABLES
HAVE PEOPLE ANNOYED YOU BY CRITICIZING YOUR DRINKING: NO
CONSUMPTION TOTAL: NEGATIVE
TOTAL SCORE: 0
HAVE YOU EVER FELT YOU SHOULD CUT DOWN ON YOUR DRINKING: NO
TOTAL SCORE: 0
HOW MANY TIMES IN THE PAST YEAR HAVE YOU HAD 5 OR MORE DRINKS IN A DAY: 0
EVER FELT BAD OR GUILTY ABOUT YOUR DRINKING: NO
ALCOHOL_USE: YES
AVERAGE NUMBER OF DAYS PER WEEK YOU HAVE A DRINK CONTAINING ALCOHOL: 3
EVER HAD A DRINK FIRST THING IN THE MORNING TO STEADY YOUR NERVES TO GET RID OF A HANGOVER: NO
TOTAL SCORE: 0
DOES PATIENT WANT TO STOP DRINKING: NO
ON A TYPICAL DAY WHEN YOU DRINK ALCOHOL HOW MANY DRINKS DO YOU HAVE: 1

## 2020-09-07 ASSESSMENT — COGNITIVE AND FUNCTIONAL STATUS - GENERAL
DAILY ACTIVITIY SCORE: 24
MOBILITY SCORE: 24
SUGGESTED CMS G CODE MODIFIER MOBILITY: CH
SUGGESTED CMS G CODE MODIFIER DAILY ACTIVITY: CH

## 2020-09-07 ASSESSMENT — PATIENT HEALTH QUESTIONNAIRE - PHQ9
1. LITTLE INTEREST OR PLEASURE IN DOING THINGS: NOT AT ALL
2. FEELING DOWN, DEPRESSED, IRRITABLE, OR HOPELESS: NOT AT ALL
SUM OF ALL RESPONSES TO PHQ9 QUESTIONS 1 AND 2: 0

## 2020-09-07 ASSESSMENT — FIBROSIS 4 INDEX
FIB4 SCORE: 1.22
FIB4 SCORE: 2.01

## 2020-09-07 NOTE — PROGRESS NOTES
This 80-year-old male with a past medical history significant for CVA,Aortic aneurysm, hypertension presented to the ER with a complaint of chest pain.  Upon presentation he is noted to have anterior ST segment elevation MI.  Cardiology was consulted, patient underwent Cath Lab and had a stent in LAD.    The patient is being transferred from Cath Lab to T7.  He is on aspirin, Brilinta, losartan 50 mg p.o. daily, atorvastatin 80 mg p.o. daily.  Cardiology will continue to follow the patient.    Dr Chidi Simental will be admitting this patient and will be placing necessary ADT orders.

## 2020-09-07 NOTE — PROGRESS NOTES
Patient arrived to the room from [facility] via bed on a ZOLL with cathlabRN. Received report from cathlab RN, Pt assessed, A&Ox4, postprocedure vitals initiated, vitals stable, pt denies pain. no SOB Noted.Right radial TR band in place, 14 ml of air in band per RN report. Site clean, dry and soft. CMT WDL.Pt instructed to limit right wrist movement.   Pt updated on plan of care, Call light within reach, personal belongings within reach.  Bed in lowest position.  Tele monitor on and monitor room notified, rhythm is SR 73. Will continue to monitor. Hourly rounding in place.

## 2020-09-07 NOTE — ASSESSMENT & PLAN NOTE
Cath:  1.  98% focal culprit mid LAD stenosis Lugo 1, 1, 0 bifurcation  2.  Otherwise nonobstructive CAD  3.  LVEF 65 to 70% prior to intervention  4.  Successful PCI LAD culprit (3.0 x 15 mm Bertin SANTINO), excellent result  Asa, statin, metoprolol  Echo pending  Card following

## 2020-09-07 NOTE — ED PROVIDER NOTES
ED Provider Note    CHIEF COMPLAINT  Chest pain    HPI  Bg Andrea is a 80 y.o. male who presents with chest pain.  Started 3 days ago.  Off and on.  Sharp central chest.  Nonradiating.  Called EMS because of the pain.  Identified to have ST elevation in the anterior leads.  Arrives as a STEMI activation.  Reports chest pain is better now.    REVIEW OF SYSTEMS  As per HPI, otherwise a 10 point review of systems is negative    PAST MEDICAL HISTORY  Past Medical History:   Diagnosis Date   • Cancer (HCC)     skin cancer on facial   • COPD (chronic obstructive pulmonary disease) (HCC)    • Dementia 12/26/2018   • Hypertension    • Severe aortic stenosis    • Severe aortic stenosis 7/3/2018   • Stroke (HCC)     6/30/18       SOCIAL HISTORY  Social History     Tobacco Use   • Smoking status: Current Every Day Smoker     Packs/day: 1.00     Years: 60.00     Pack years: 60.00     Types: Cigarettes     Start date: 7/2/1950   • Smokeless tobacco: Never Used   • Tobacco comment: cut down to 3-4 cigs/day past 2 years   Substance Use Topics   • Alcohol use: No     Comment: Quit 4 yrs ago. Rarely drank.    • Drug use: No       SURGICAL HISTORY  Past Surgical History:   Procedure Laterality Date   • LOBECTOMY Left    • OTHER      right hand finger   • OTHER ORTHOPEDIC SURGERY      back sx x3       CURRENT MEDICATIONS  Home Medications    **Home medications have not yet been reviewed for this encounter**         ALLERGIES  No Known Allergies    PHYSICAL EXAM  VITAL SIGNS: See STEMI flowsheet  Constitutional: Awake and alert.  Elderly male  HENT: Grossly normal  Eyes: Normal inspection  Neck: Supple  Cardiovascular: Normal heart rate, Normal rhythm.  Symmetric peripheral pulses.   Thorax & Lungs: No respiratory distress   Skin: Warm, Dry, No rash.   Extremities: No asymmetric swelling  Neurologic: Grossly normal   Psychiatric: Anxious appearing    RADIOLOGY/PROCEDURES  CL-LEFT HEART CATHETERIZATION WITH POSSIBLE INTERVENTION     (Results Pending)   DX-CHEST-LIMITED (1 VIEW)    (Results Pending)   CL-LEFT HEART CATHETERIZATION WITH POSSIBLE INTERVENTION    (Results Pending)        Imaging is interpreted by radiologist    Labs:  Results for orders placed or performed during the hospital encounter of 20   EKG   Result Value Ref Range    Report       [facility]    Test Date:  2020  Pt Name:    LOVE BRAN               Department: ER  MRN:        [id number]                      Room:  Gender:     Male                         Technician: [id number]  :        1940                   Requested By:NEREYDA QUIÑONES  Order #:    [id number]                    Reading MD:    Measurements  Intervals                                Axis  Rate:       104                          P:          38  OK:         148                          QRS:        55  QRSD:       84                           T:          70  QT:         364  QTc:        479    Interpretive Statements  SINUS TACHYCARDIA  PAIRED VENTRICULAR PREMATURE COMPLEXES  CONSIDER ANTERIOR INFARCT  MINIMAL ST DEPRESSION, ANTEROLATERAL LEADS  BASELINE WANDER IN LEAD(S) V6  Compared to ECG 2018 18:11:57  Myocardial infarct finding now present  Sinus rhythm no longer present  ST (T wave) deviation still present       Reviewed EKGs presented by paramedics demonstrating ST elevation in lead I, aVL and third precordial's.    Medications   LIDOCAINE HCL 2 % INJ SOLN (has no administration in time range)   HEPARIN 1000 UNITS/ML OR USE ONLY (has no administration in time range)   VERAPAMIL HCL 2.5 MG/ML IV SOLN (has no administration in time range)   HEPARIN (PORCINE) IN NACL 2000-0.9 UNIT/L-% IV SOLN (has no administration in time range)   NITROGLYCERIN 2 MG IV SOLN (has no administration in time range)   MIDAZOLAM HCL 2 MG/2ML INJ SOLN (WRAPPED) (has no administration in time range)   FENTANYL CITRATE (PF) 0.05 MG/ML INJ SOLN (WRAPPED) (has no  administration in time range)     Aspirin given prior to arrival    COURSE & MEDICAL DECISION MAKING  Patient presents with intermittent chest pain with obvious ST elevation on EKG.  Dr. Smith was present on patient's arrival.  Patient was interviewed and will be taken directly to the cardiac catheterization lab.  Condition is guarded.    FINAL IMPRESSION  1.  ST elevation myocardial infarction      This dictation was created using voice recognition software. The accuracy of the dictation is limited to the abilities of the software.  The nursing notes were reviewed and certain aspects of this information were incorporated into this note.      Electronically signed by: Bridger Muse M.D., 9/7/2020 10:08 AM

## 2020-09-07 NOTE — PROGRESS NOTES
1130Remove 3 ml's of air from right radial artery compression device, checked hemostasis, no bleeding noted. Right radial Pulse 2+, no pain in hand, CMS intact.   1145 Remove 3 ml's of air from right radial artery compression device, checked hemostasis, no bleeding noted. Right radial Pulse 2+, no pain in hand, CMS intact.   1200Remove 3 ml's of air from right radial artery compression device, checked hemostasis, no bleeding noted. Right radial Pulse 2+, no pain in hand, CMS intact.   1215 Remove 3 ml's of air from right radial artery compression device, checked hemostasis, no bleeding noted. Right radial Pulse 2+, no pain in hand, CMS intact.   1230Remove 3 ml's of air from right radial artery compression device, checked hemostasis, no bleeding noted. Right radial Pulse 2+, no pain in hand, CMS intact.     Gauze and tegaderm dressing to R radial site applied. RUE circulation, movement and sensation intact

## 2020-09-07 NOTE — PROCEDURES
REFERRING PHYSICIAN: Dr. Smith    PREOPERATIVE DIAGNOSIS:  1.  Anterior STEMI    POSTOPERATIVE DIAGNOSIS:  1.  98% focal culprit mid LAD stenosis Lugo 1, 1, 0 bifurcation  2.  Otherwise nonobstructive CAD  3.  LVEF 65 to 70% prior to intervention  4.  Successful PCI LAD culprit (3.0 x 15 mm [place] SANTINO), excellent result      PROCEDURE PERFORMED:  Selective coronary angiography of the native vessels  Left heart catheterization  Left ventriculogram  PCI of LAD SANTINO  Supervision moderate sedation    DESCRIPTION OF PROCEDURE:  The risks and benefits of cardiac catheterization as well as the procedure itself, rationale and appropriateness were discussed with the patient today. Complications including but not limited to death, stroke, MI, urgent bypass surgery, contrast nephropathy, vascular complications, bleeding and infection were explained to the patient. The potential outcomes associated with the procedure (possible PCI, possible CABG, possible medical Rx only) were also discussed at length. The patient agreed to proceed.    The patient was transported to the catheterization laboratory in the fasting state. The right radial area and right groin were prepped and draped in the usual fashion. Right radial area was entered with a single through and through puncture under direct ultrasound guidance and a 6F glide sheath was placed. An intra-arterial cocktail of heparin, verapamil and nitroglycerine was administered. Over a wire, a 5F Marry catheter was passed to the aortic root and used to engage the right and left coronaries without difficulty. Contrast was administered and multiple images obtained. This catheter was then used to cross the aortic valve for LHC and contrast was administered at 8cc/s for 24cc's for left ventriculogram.     DESCRIPTION OF PCI:  The decision was made to intervene on the culprit coronary artery.  Bivalirudin bolus infusion initiated.  The diagnostic catheter was exchanged over a wire for an  6 [place] EBU 3.5 guide seated appropriately. A BMW 0.014 floppy tip wire was navigated across the culprit stenosis. A 3.0 mm balloon was used to predilate the lesion. A 3.0 x 15 mm Bertin SANTINO was unable to cross and therefore a guide extension catheter was utilized to allow positioning.  It was then positioned and deployed at nominal pressure. Following this the stenting balloon at high pressure was used to post dilate the stent. There was an excellent angiographic result with JEANETH-3 flow and no residual stenosis in the stented segment. All catheters and guidewires were removed and a TR band was applied to achieve patent hemostasis. Patient left the cath lab in stable condition.      Moderate sedation directly monitored by me during the case while supervising the administration of the sedation medication by an independent trained RN to assist in the monitoring of the patient's level of consciousness and physiological status. I, the supervising physician was present the entire time from beginning of medication administration until the end of the procedure from 10:20 AM until 10:41 AM. For detailed administration records please see the moderate sedation documentation in the median tab.      FINDINGS:  I. HEMODYNAMICS:    Ao: 121/57 mmHg   LEDP: 10No mmHg   Gradient on LV pullback: No    II. LEFT VENTRICULOGRAM:   LVEF JESUS PROJECTION: 65 %     III. CORONARY ANGIOGRAPHY:  Left Main: Large caliber mild nonobstructive CAD trifurcating.  Left Anterior Descending: Moderate caliber vessel terminating at the apex gives rise to several diagonals.  There is a 98% focal culprit thrombotic stenosis in its midportion and a Lugo 1, 1, 0 configuration with a diagonal.  Postintervention there is 0% residual stenosis in the stented segment JEANETH-3 flow.  There is moderate nonobstructive proximal calcific CAD in this vessel as well.  Left Circumflex: Moderate caliber nondominant supplying a large first obtuse marginal/ramus intermedius.   No significant CAD in these territories.  Right Coronary: Large and dominant with a nonobstructive 50% midportion stenosis and diffuse calcific atherosclerosis.    COMPLICATIONS: none apparent    CONCLUSIONS:  1.  98% focal culprit mid LAD stenosis Lugo 1, 1, 0 bifurcation  2.  Otherwise nonobstructive CAD  3.  LVEF 65 to 70% prior to intervention  4.  Successful PCI LAD culprit (3.0 x 15 mm [place] SANTINO), excellent result

## 2020-09-07 NOTE — H&P
Hospital Medicine History & Physical Note    Date of Service  9/7/2020    Primary Care Physician  No primary care provider on file.    Consultants  cardiology    Code Status  Full Code    Chief Complaint  Chest pain    History of Presenting Illness  80 y.o. male with PMH of HTN, DLDs, [facility], CV[facility] who presented 9/7/2020 with chest pain for the past 3 days.  He was diagnosed with STEMI and cardiology was consulted and the patient was immediately taken to angiogram.  By the time I saw the patient he already has stent in LAD.  Currently the patient is chest pain-free.  He stated that he do not have history of dementia and he lives with his wife.    Review of Systems  Review of Systems   Constitutional: Negative for chills, fever and weight loss.   HENT: Negative for congestion and nosebleeds.    Eyes: Negative for blurred vision, pain, discharge and redness.   Respiratory: Negative for cough, sputum production, shortness of breath and stridor.    Cardiovascular: Negative for chest pain, palpitations and orthopnea.   Gastrointestinal: Negative for abdominal pain, diarrhea, heartburn, nausea and vomiting.   Genitourinary: Negative for dysuria, frequency and urgency.   Musculoskeletal: Negative for back pain, myalgias and neck pain.   Skin: Negative for itching and rash.   Neurological: Negative for dizziness, focal weakness, seizures and headaches.       Past Medical History   has a past medical history of Cancer ([facility]), COPD (chronic obstructive pulmonary disease) ([facility]), Dementia ([facility]) (12/26/2018), Hypertension, Severe aortic stenosis, Severe aortic stenosis (7/3/2018), ST elevation myocardial infarction involving left anterior descending (LAD) coronary artery ([facility]) (9/7/2020), and Stroke ([facility]).    Surgical History   has a past surgical history that includes other orthopedic surgery; other; and lobectomy (Left).     Family History  family history includes No Known Problems in his father and mother.     Social  History   reports that he has been smoking cigarettes. He started smoking about 70 years ago. He has a 60.00 pack-year smoking history. He has never used smokeless tobacco. He reports that he does not drink alcohol or use drugs.    Allergies  No Known Allergies    Medications  Prior to Admission Medications   Prescriptions Last Dose Informant Patient Reported? Taking?   BACITRACIN-POLYMYXIN B EX unknown at Unknown time Patient's Home Pharmacy Yes Yes   Sig: Apply 1 Application to affected area(s) 2 Times a Day.   Cholecalciferol 1000 UNIT Cap unknown at Unknown time Patient's Home Pharmacy Yes Yes   Sig: Take 1 Cap by mouth 2 Times a Day.   Cyanocobalamin (VITAMIN B-12) 1000 MCG Tab unknown at Unknown time Patient's Home Pharmacy Yes No   Sig: Take 2,000 mcg by mouth every day.   Thiamine HCl (THIAMINE PO) unknown at Unknown time Patient's Home Pharmacy Yes Yes   Sig: Take 1 Tab by mouth every day.   amLODIPine (NORVASC) 5 MG Tab unknown at Unknown time Patient's Home Pharmacy Yes Yes   Sig: Take 5 mg by mouth every day.   atorvastatin (LIPITOR) 40 MG Tab unknown at Unknown time Patient's Home Pharmacy Yes Yes   Sig: Take 80 mg by mouth every evening.   clopidogrel (PLAVIX) 75 MG Tab unknown at Unknown time Patient's Home Pharmacy No No   Sig: Take 1 Tab by mouth every day.   losartan (COZAAR) 50 MG Tab unknown at Unknown time Patient's Home Pharmacy Yes Yes   Sig: Take 50 mg by mouth every day.   therapeutic multivitamin-minerals (THERAGRAN-M) Tab unknown at Unknown time Patient's Home Pharmacy Yes Yes   Sig: Take 1 Tab by mouth every day.      Facility-Administered Medications: None       Physical Exam       Physical Exam  Vitals signs reviewed.   Constitutional:       General: He is not in acute distress.     Appearance: Normal appearance.   HENT:      Head: Normocephalic and atraumatic.      Nose: No congestion or rhinorrhea.   Eyes:      Extraocular Movements: Extraocular movements intact.      Pupils: Pupils  are equal, round, and reactive to light.   Neck:      Musculoskeletal: Normal range of motion and neck supple.   Cardiovascular:      Rate and Rhythm: Normal rate and regular rhythm.      Pulses: Normal pulses.   Pulmonary:      Effort: Pulmonary effort is normal. No respiratory distress.      Breath sounds: Normal breath sounds.   Abdominal:      General: Bowel sounds are normal. There is no distension.      Palpations: Abdomen is soft.      Tenderness: There is no abdominal tenderness.   Musculoskeletal:         General: No swelling or tenderness.   Skin:     General: Skin is warm.      Findings: No erythema.   Neurological:      Mental Status: He is alert.         Laboratory:  Recent Labs     09/07/20  1001   WBC 9.6   RBC 5.78   HEMOGLOBIN 15.1   HEMATOCRIT 49.6   MCV 85.8   MCH 26.1*   MCHC 30.4*   RDW 59.9*   PLATELETCT 284   MPV 10.6     Recent Labs     09/07/20  1001   SODIUM 131*   POTASSIUM 4.2   CHLORIDE 95*   CO2 20   GLUCOSE 211*   BUN 5*   CREATININE 1.03   CALCIUM 8.9     Recent Labs     09/07/20  1001   ALTSGPT 8   ASTSGOT 12   ALKPHOSPHAT 74   TBILIRUBIN 0.6   LIPASE 17   GLUCOSE 211*     Recent Labs     09/07/20  1001   APTT 25.7   INR 1.03     Recent Labs     09/07/20  1001   NTPROBNP 882*         Recent Labs     09/07/20  1001   TROPONINT 34*       Imaging:  DX-CHEST-LIMITED (1 VIEW)   Final Result      1.  Mild diffuse prominence of the pulmonary interstitium, edema versus interstitial lung disease.   2.  No pneumonia or pneumothorax.      CL-LEFT HEART CATHETERIZATION WITH POSSIBLE INTERVENTION    (Results Pending)   CL-LEFT HEART CATHETERIZATION WITH POSSIBLE INTERVENTION    (Results Pending)   EC-ECHOCARDIOGRAM COMPLETE W/O CONT    (Results Pending)         Assessment/Plan:  I anticipate this patient will require at least two midnights for appropriate medical management, necessitating inpatient admission.    Essential hypertension- (present on admission)  Assessment & Plan  Continue  amlodipine, losartan      Elevated brain natriuretic peptide (BNP) level- (present on admission)  Assessment & Plan  Echo pending      ST elevation myocardial infarction involving left anterior descending (LAD) coronary artery (HCC)- (present on admission)  Assessment & Plan  Cath:  1.  98% focal culprit mid LAD stenosis [place] 1, 1, 0 bifurcation  2.  Otherwise nonobstructive CAD  3.  LVEF 65 to 70% prior to intervention  4.  Successful PCI LAD culprit (3.0 x 15 mm Morgan SANTINO), excellent result  Asa, statin, metoprolol  Echo pending  Card following

## 2020-09-07 NOTE — PROGRESS NOTES
2 RN Skin Check     2 RN skin check complete with Naveed SAINI.   Devices in place: telemetry monitoring, PIV, nasal cannula  Skin assessed under devices: yes.  Confirmed pressure ulcers found on: n/a.  New potential pressure ulcers noted on n/a. Wound consult placed No.  The following interventions in place Pillows and Barrier cream.        Skin is CDI,  old scar noted to pt's left forearm,

## 2020-09-07 NOTE — DISCHARGE PLANNING
STEMI Response    Referral: Code STEMI Response    Intervention: SW responded to STEMI.  Pt was BIB [facility] after reported complaints of chest pain for the last three days.  Pt was alert upon arrival.  Pts name is Bg Andrea (: 36).  SW obtained the following pt information: Pt reported that he has been having chest pain for the past three days off and on.  Pt is  and lives at home with his wife who was present while EMS was at the home.  SW was asked to contact pts wife Mandi Andrea at [phone].  SW called and updated Pt's wife Mandi on Pt condition and that staff would update her as soon as he is out of the procedure.    Plan: SW will continue to monitor and assist as needed.

## 2020-09-07 NOTE — CONSULTS
Cardiology Initial Consult Note    Date of note:    9/7/2020      Consulting Physician: Bridger Muse M.D.    Patient ID:    Name:   Bg Andrea     YOB: 1940  Age:   80 y.o.  male   MRN:   [id number]      Reason for Consultation: STEMI    HPI:  Bg Andrea is a 80 y.o.-year-old male with a history of stroke and hypertension who presents with chest pain.     [facility] He does not remember his medical history except the fact he has had a CVA in the past. Per EMS, he has a history of aortic aneurysm [facility] which [facility]. He does not think he has a cardiologist.    For the last three days he has had stuttering mild to severe substernal chest pressure which is worse with movement. EMS was called this morning and intiial EKG showed anterolateral STEMI. He received ntg and Aspirin and his chest pain improved to mild. His ST changes improved as well.    He had mild chest pain on arrival to the ED.     He had no symptoms of COVID or COVID exposure.       Review of Systems   Unable to perform ROS: acuity of condition         Past Medical History:   Diagnosis Date   • Cancer (HCC)     skin cancer on facial   • COPD (chronic obstructive pulmonary disease) (HCC)    • Dementia 12/26/2018   • Hypertension    • Severe aortic stenosis    • Severe aortic stenosis 7/3/2018   • Stroke (HCC)     6/30/18       Past Surgical History:   Procedure Laterality Date   • LOBECTOMY Left    • OTHER      right hand finger   • OTHER ORTHOPEDIC SURGERY      back sx x3         Current Outpatient Medications   Medication Sig Dispense Refill   • amLODIPine (NORVASC) 5 MG Tab Take 5 mg by mouth every day.     • atorvastatin (LIPITOR) 40 MG Tab Take 80 mg by mouth every evening.     • losartan (COZAAR) 50 MG Tab Take 50 mg by mouth every day.     • Cholecalciferol 1000 UNIT Cap Take 1 Cap by mouth 2 Times a Day.     • therapeutic multivitamin-minerals (THERAGRAN-M) Tab Take 1 Tab by mouth every  day.     • Thiamine HCl (THIAMINE PO) Take 1 Tab by mouth every day.     • BACITRACIN-POLYMYXIN B EX Apply 1 Application to affected area(s) 2 Times a Day.     • clopidogrel (PLAVIX) 75 MG Tab Take 1 Tab by mouth every day. 30 Tab 0   • Cyanocobalamin (VITAMIN B-12) 1000 MCG Tab Take 2,000 mcg by mouth every day.           No Known Allergies      Family History   Problem Relation Age of Onset   • No Known Problems Mother    • No Known Problems Father    • Cancer Neg Hx    • Heart Disease Neg Hx          Social History     Socioeconomic History   • Marital status:      Spouse name: Not on file   • Number of children: Not on file   • Years of education: Not on file   • Highest education level: Not on file   Occupational History   • Not on file   Social Needs   • Financial resource strain: Not on file   • Food insecurity     Worry: Not on file     Inability: Not on file   • Transportation needs     Medical: Not on file     Non-medical: Not on file   Tobacco Use   • Smoking status: Current Every Day Smoker     Packs/day: 1.00     Years: 60.00     Pack years: 60.00     Types: Cigarettes     Start date: 7/2/1950   • Smokeless tobacco: Never Used   • Tobacco comment: cut down to 3-4 cigs/day past 2 years   Substance and Sexual Activity   • Alcohol use: No     Comment: Quit 4 yrs ago. Rarely drank.    • Drug use: No   • Sexual activity: Not on file   Lifestyle   • Physical activity     Days per week: Not on file     Minutes per session: Not on file   • Stress: Not on file   Relationships   • Social connections     Talks on phone: Not on file     Gets together: Not on file     Attends [employer] service: Not on file     Active member of club or organization: Not on file     Attends meetings of clubs or organizations: Not on file     Relationship status: Not on file   • Intimate partner violence     Fear of current or ex partner: Not on file     Emotionally abused: Not on file     Physically abused: Not on file      "Forced sexual activity: Not on file   Other Topics Concern   • Not on file   Social History Narrative   • Not on file         Physical Exam  Body mass index is 25.06 kg/m².  Ht 1.702 m (5' 7\")   Wt 72.6 kg (160 lb)        /81 Ow 95% on 2L.     Vitals:    09/07/20 1001   Weight: 72.6 kg (160 lb)   Height: 1.702 m (5' 7\")     Oxygen Therapy:       General: No apparent distress  Eyes: nl conjunctiva  ENT: OP clear  Neck: JVP <8 cm H2O, no carotid bruits  Lungs: normal respiratory effort, CTAB  Heart: RRR, 2/6 systolic mumrur, no rubs or gallops, no edema bilateral lower extremities. No LV/RV heave on cardiac palpatation. 2+ bilateral radial pulses.  2+ bilateral dp pulses.   Abdomen: soft, non tender, non distended, no masses, normal bowel sounds.  No HSM.  Extremities/MSK: no clubbing, no cyanosis  Neurological: No focal sensory deficits  Psychiatric: Appropriate affect, A/O x 3  Skin: Warm extremities        Labs (personally reviewed and notable for):   Creatinine 1.  Trop 34        Cardiac Imaging and Procedures Review:    EKG dated 9/7/2020: My personal interpretation is NSR, anterolateral ST changes    Radiology test Review:  CXR: personally reviewed and showed no      Impression and Medical Decision Making:  # STEMI - anterolateral  # Hypertension  # history of CVA, ischemic in 2018.   # Hyponatremia  # ? Of aortic aneurysm.     Recommendations:  # Discussed case with Dr. Santacruz who agree emergent cardiac catheterization. This confirmed mid LAD STEMI which was revascularized with excellent results  # admit to telemetry, Discussed with Dr. Mccoy.   # Echo  # aspirin 81mg PO daily  # lipitor 40mg PO daily  # restart home norvasc, losartan  # stop home plavix.   # obtain OSH records.       Thank you for allowing me to participate in the care of this patient, I will continue to follow.  Please contact me with any questions.      Satinder Smith MD  Cardiologist, [facility] [place] "   [phone]

## 2020-09-08 ENCOUNTER — PATIENT OUTREACH (OUTPATIENT)
Dept: HEALTH INFORMATION MANAGEMENT | Facility: OTHER | Age: 80
End: 2020-09-08

## 2020-09-08 VITALS
HEIGHT: 67 IN | WEIGHT: 157.63 LBS | OXYGEN SATURATION: 97 % | BODY MASS INDEX: 24.74 KG/M2 | DIASTOLIC BLOOD PRESSURE: 87 MMHG | TEMPERATURE: 96.8 F | HEART RATE: 78 BPM | SYSTOLIC BLOOD PRESSURE: 141 MMHG | RESPIRATION RATE: 18 BRPM

## 2020-09-08 PROBLEM — Z95.5 STATUS POST INSERTION OF DRUG-ELUTING STENT INTO LEFT ANTERIOR DESCENDING (LAD) ARTERY: Status: ACTIVE | Noted: 2020-09-08

## 2020-09-08 PROBLEM — I21.02 ST ELEVATION MYOCARDIAL INFARCTION INVOLVING LEFT ANTERIOR DESCENDING (LAD) CORONARY ARTERY (HCC): Status: RESOLVED | Noted: 2020-09-07 | Resolved: 2020-09-08

## 2020-09-08 LAB
ANION GAP SERPL CALC-SCNC: 12 MMOL/L (ref 7–16)
BUN SERPL-MCNC: 5 MG/DL (ref 8–22)
CALCIUM SERPL-MCNC: 8.8 MG/DL (ref 8.5–10.5)
CHLORIDE SERPL-SCNC: 99 MMOL/L (ref 96–112)
CO2 SERPL-SCNC: 21 MMOL/L (ref 20–33)
CREAT SERPL-MCNC: 0.89 MG/DL (ref 0.5–1.4)
ERYTHROCYTE [DISTWIDTH] IN BLOOD BY AUTOMATED COUNT: 58.8 FL (ref 35.9–50)
GLUCOSE SERPL-MCNC: 98 MG/DL (ref 65–99)
HCT VFR BLD AUTO: 47.3 % (ref 42–52)
HGB BLD-MCNC: 14.7 G/DL (ref 14–18)
MCH RBC QN AUTO: 25.9 PG (ref 27–33)
MCHC RBC AUTO-ENTMCNC: 31.1 G/DL (ref 33.7–35.3)
MCV RBC AUTO: 83.3 FL (ref 81.4–97.8)
PLATELET # BLD AUTO: 292 K/UL (ref 164–446)
PMV BLD AUTO: 10.4 FL (ref 9–12.9)
POTASSIUM SERPL-SCNC: 3.9 MMOL/L (ref 3.6–5.5)
RBC # BLD AUTO: 5.68 M/UL (ref 4.7–6.1)
SODIUM SERPL-SCNC: 132 MMOL/L (ref 135–145)
WBC # BLD AUTO: 10.5 K/UL (ref 4.8–10.8)

## 2020-09-08 PROCEDURE — 99232 SBSQ HOSP IP/OBS MODERATE 35: CPT | Mod: GC | Performed by: INTERNAL MEDICINE

## 2020-09-08 PROCEDURE — 80048 BASIC METABOLIC PNL TOTAL CA: CPT

## 2020-09-08 PROCEDURE — A9270 NON-COVERED ITEM OR SERVICE: HCPCS | Performed by: INTERNAL MEDICINE

## 2020-09-08 PROCEDURE — 85027 COMPLETE CBC AUTOMATED: CPT

## 2020-09-08 PROCEDURE — 97535 SELF CARE MNGMENT TRAINING: CPT

## 2020-09-08 PROCEDURE — 36415 COLL VENOUS BLD VENIPUNCTURE: CPT

## 2020-09-08 PROCEDURE — 99239 HOSP IP/OBS DSCHRG MGMT >30: CPT | Performed by: HOSPITALIST

## 2020-09-08 PROCEDURE — 700102 HCHG RX REV CODE 250 W/ 637 OVERRIDE(OP): Performed by: INTERNAL MEDICINE

## 2020-09-08 PROCEDURE — 99406 BEHAV CHNG SMOKING 3-10 MIN: CPT

## 2020-09-08 RX ORDER — AMLODIPINE BESYLATE 5 MG/1
10 TABLET ORAL DAILY
Qty: 30 TAB | Refills: 0 | Status: SHIPPED | OUTPATIENT
Start: 2020-09-08 | End: 2020-10-22

## 2020-09-08 RX ORDER — CARVEDILOL 3.12 MG/1
3.12 TABLET ORAL 2 TIMES DAILY WITH MEALS
Status: DISCONTINUED | OUTPATIENT
Start: 2020-09-08 | End: 2020-09-08 | Stop reason: HOSPADM

## 2020-09-08 RX ORDER — CARVEDILOL 3.12 MG/1
3.12 TABLET ORAL 2 TIMES DAILY WITH MEALS
Qty: 60 TAB | Refills: 0 | Status: SHIPPED | OUTPATIENT
Start: 2020-09-08

## 2020-09-08 RX ORDER — ASPIRIN 81 MG/1
81 TABLET ORAL DAILY
Qty: 100 TAB | Refills: 0 | Status: SHIPPED | OUTPATIENT
Start: 2020-09-09

## 2020-09-08 RX ADMIN — TICAGRELOR 90 MG: 90 TABLET ORAL at 06:10

## 2020-09-08 RX ADMIN — AMLODIPINE BESYLATE 5 MG: 5 TABLET ORAL at 06:09

## 2020-09-08 RX ADMIN — ASPIRIN 81 MG: 81 TABLET, COATED ORAL at 06:10

## 2020-09-08 RX ADMIN — DOCUSATE SODIUM 50 MG AND SENNOSIDES 8.6 MG 2 TABLET: 8.6; 5 TABLET, FILM COATED ORAL at 06:10

## 2020-09-08 RX ADMIN — LOSARTAN POTASSIUM 50 MG: 50 TABLET, FILM COATED ORAL at 06:10

## 2020-09-08 NOTE — DISCHARGE PLANNING
Hospital Care Management Team Assessment     In the case of an emergency, pt's NOK is Mandi Andrea (wife) at [phone].     This RNCM spoke with patient at bedside, verified the accuracy of the facesheet, and obtained the information used in this assessment.      Pt lives with his wife in a studio apartment in [place]. Prior to current hospitalization, pt was independent with ADLS/IADLS. Pt [facility]. Pt doesn't drive. Pt is currently retired. Pt has prescription coverage, and [facility] [place]. Pt's support system includes his wife. Pt's discharge plan is to return home once medically cleared.       Information Source  Orientation : Oriented x 4  Information Given By: Patient  Informant's Name: Malik Andrea  Who is responsible for making decisions for patient? : Patient    Readmission Evaluation  Is this a readmission?: No    Elopement Risk  Legal Hold: No  Ambulatory or Self Mobile in Wheelchair: Yes  Disoriented: No  Psychiatric Symptoms: None  History of Wandering: No  Elopement this Admit: No  Vocalizing Wanting to Leave: No  Displays Behaviors, Body Language Wanting to Leave: No-Not at Risk for Elopement  Elopement Risk: Not at Risk for Elopement    Interdisciplinary Discharge Planning  Does Admitting Nurse Feel This Could be a Complex Discharge?: No  [facility]  Lives with - Patient's Self Care Capacity: Friends([facility])  Patient or legal guardian wants to designate a caregiver: No  Support Systems: Spouse / Significant Other  Housing / Facility: 1 Story Apartment / Condo  Do You Take your Prescribed Medications Regularly: Yes  Able to Return to Previous ADL's: Yes  Mobility Issues: Yes  Prior Services: None, Home-Independent  Patient Prefers to be Discharged to:: Home  Assistance Needed: Unknown at this Time  Durable Medical Equipment: Walker, Other - Specify(power-chair)  [facility] Provider / Phone: [facility]    Discharge Preparedness  What is  your plan after discharge?: Home with help  What are your discharge supports?: Spouse  Prior Functional Level: Uses Wheelchair, Independent with Activities of Daily Living, Independent with Medication Management  Difficulity with ADLs: Walking  Difficulty with ADLs Comment: Uses power-chair  Difficulity with IADLs: Driving  Difficulity with IADL Comments: States he doesn't drive    Functional Assesment  Prior Functional Level: Uses Wheelchair, Independent with Activities of Daily Living, Independent with Medication Management    Finances  Financial Barriers to Discharge: No  Prescription Coverage: Yes              Advance Directive  Advance Directive?: DPOA for Health Care  Durable Power of  Name and Contact : Mandi Suman [phone]    Domestic Abuse  Have you ever been the victim of abuse or violence?: No  Physical Abuse or Sexual Abuse: No  Verbal Abuse or Emotional Abuse: No  Possible Abuse/Neglect Reported to:: Not Applicable    Psychological Assessment  History of Substance Abuse: None  History of Psychiatric Problems: No  Non-compliant with Treatment: No  Newly Diagnosed Illness: No    Discharge Risks or Barriers  Discharge risks or barriers?: No  Patient risk factors: Vulnerable adult

## 2020-09-08 NOTE — DISCHARGE INSTRUCTIONS
Discharge Instructions    Discharged to home by taxi with self. Discharged via wheelchair, hospital escort: Yes.  Special equipment needed: Not Applicable    Be sure to schedule a follow-up appointment with your primary care doctor or any specialists as instructed.     Discharge Plan:   Diet Plan: Discussed  Activity Level: Discussed  Confirmed Follow up Appointment: Patient to Call and Schedule Appointment  Confirmed Symptoms Management: Discussed  Medication Reconciliation Updated: Yes    I understand that a diet low in cholesterol, fat, and sodium is recommended for good health. Unless I have been given specific instructions below for another diet, I accept this instruction as my diet prescription.   Other diet: low sodium    Special Instructions: Diagnosis:  Acute Coronary Syndrome (ACS) is a diagnosis that encompasses cardiac-related chest pain and heart attack. ACS occurs when the blood flow to the heart muscle is severely reduced or cut off completely due to a slow process called atherosclerosis.  Atherosclerosis is a disease in which the coronary arteries become narrow from a buildup of fat, cholesterol, and other substances that combine to form plaque. If the plaque breaks, a blood clot will form and block the blood flow to the heart muscle. This lack of blood flow can cause damage or death to the heart muscle which is called a heart attack or Myocardial Infarction (MI). There are two different types of MIs:  ST Elevation Myocardial Infarction or STEMI (the most severe type of heart attack) and Non-ST Elevation Myocardial Infarction or NSTEMI.    Treatment Plan:  · Cardiac Diet  - Low fat, low salt, low cholesterol   · Cardiac Rehab  - Your doctor has ordered you a referral to [facility].  Call [phone] to schedule an appointment.  · Attend my follow-up appointment with my Cardiologist.  · Take my medications as prescribed by my doctor  · Exercise daily  · Quit Smoking, Lower my bad cholesterol and raise  my good cholesterol, lower my blood pressure and Reduce stress    Medications:  Certain medications are used to treat ACS.  Remember to always take medications as prescribed and never stop talking medications unless told by your doctor.    You have been prescribed the following medicatons:    Aspirin - Aspirin is used as a blood thinning medication and you will require this medication indefinitely.  Anti-platelet/blood thinner - Your Anti-platelet/Blood thinning medication is called Brilinta, and is used in combination with aspirin to prevent clots from forming in your heart and/or around your stent.  Your doctor will determine how long you need to be on this medicine.  Beta-Blocker - Beta-Blocker Carvedilol is used to lower blood pressure and heart rate, and/or helps your heart heal after a heart attack.  Statin - Statin Lipitor is used to lower cholesterol.    · Is patient discharged on Warfarin / Coumadin?   No     Depression / Suicide Risk    As you are discharged from this [facility] facility, it is important to learn how to keep safe from harming yourself.    Recognize the warning signs:  · Abrupt changes in personality, positive or negative- including increase in energy   · Giving away possessions  · Change in eating patterns- significant weight changes-  positive or negative  · Change in sleeping patterns- unable to sleep or sleeping all the time   · Unwillingness or inability to communicate  · Depression  · Unusual sadness, discouragement and loneliness  · Talk of wanting to die  · Neglect of personal appearance   · Rebelliousness- reckless behavior  · Withdrawal from people/activities they love  · Confusion- inability to concentrate     If you or a loved one observes any of these behaviors or has concerns about self-harm, here's what you can do:  · Talk about it- your feelings and reasons for harming yourself  · Remove any means that you might use to hurt yourself (examples: pills, rope, extension  cords, firearm)  · Get professional help from the community (Mental Health, Substance Abuse, psychological counseling)  · Do not be alone:Call your Safe Contact- someone whom you trust who will be there for you.  · Call your local CRISIS HOTLINE 511-3347 or 595-061-9191  · Call your local Children's Mobile Crisis Response Team [place] (185) 492-4106 or www.CredSimple  · Call the toll free National Suicide Prevention Hotlines   · National Suicide Prevention Lifeline 692-829-AFUF (6495)  · [employer] Line Network 800-SUICIDE (583-8156)        Heart Attack  A heart attack occurs when blood and oxygen supply to the heart is cut off. A heart attack causes damage to the heart that cannot be fixed. A heart attack is also called a myocardial infarction, or MI. If you think you are having a heart attack, do not wait to see if the symptoms will go away. Get medical help right away.  What are the causes?  This condition may be caused by:  · A fatty substance (plaque) in the blood vessels (arteries). This can block the flow of blood to the heart.  · A blood clot in the blood vessels that go to the heart. The blood clot blocks blood flow.  · Low blood pressure.  · An abnormal heartbeat.  · Some diseases, such as problems in red blood cells (anemia)orproblems in breathing (respiratory failure).  · Tightening (spasm) of a blood vessel that cuts off blood to the heart.  · A tear in a blood vessel of the heart.  · High blood pressure.  What increases the risk?  The following factors may make you more likely to develop this condition:  · Aging. The older you are, the higher your risk.  · Having a personal or family history of chest pain, heart attack, stroke, or narrowing of the arteries in the legs, arms, head, or stomach (peripheral artery disease).  · Being male.  · Smoking.  · Not getting regular exercise.  · Being overweight or obese.  · Having high blood pressure.  · Having high cholesterol.  · Having  diabetes.  · Drinking too much alcohol.  · Using illegal drugs, such as cocaine or methamphetamine.  What are the signs or symptoms?  Symptoms of this condition include:  · Chest pain. It may feel like:  ? Crushing or squeezing.  ? Tightness, pressure, fullness, or heaviness.  · Pain in the arm, neck, jaw, back, or upper body.  · Shortness of breath.  · Heartburn.  · Upset stomach (indigestion).  · Feeling like you may vomit (nauseous).  · Cold sweats.  · Feeling tired.  · Sudden light-headedness.  How is this treated?  A heart attack must be treated as soon as possible. Treatment may include:  · Medicines to:  ? Break up or dissolve blood clots.  ? Thin blood and help prevent blood clots.  ? Treat blood pressure.  ? Improve blood flow to the heart.  ? Reduce pain.  ? Reduce cholesterol.  · Procedures to widen a blocked artery and keep it open.  · Open heart surgery.  · Receiving oxygen.  · Making your heart strong again (cardiac rehabilitation) through exercise, education, and counseling.  Follow these instructions at home:  Medicines  · Take over-the-counter and prescription medicines only as told by your doctor. You may need to take medicine:  ? To keep your blood from clotting too easily.  ? To control blood pressure.  ? To lower cholesterol.  ? To control heart rhythms.  · Do not take these medicines unless your doctor says it is okay:  ? NSAIDs, such as ibuprofen.  ? Supplements that have vitamin A, vitamin E, or both.  ? Hormone replacement therapy that has estrogen with or without progestin.  Lifestyle         · Do not use any products that have nicotine or tobacco, such as cigarettes, e-cigarettes, and chewing tobacco. If you need help quitting, ask your doctor.  · Avoid secondhand smoke.  · Exercise regularly. Ask your doctor about a cardiac rehab program.  · Eat heart-healthy foods. Your doctor will tell you what foods to eat.  · Stay at a healthy weight.  · Lower your stress level.  · Do not use illegal  drugs.  Alcohol use  · Do not drink alcohol if:  ? Your doctor tells you not to drink.  ? You are pregnant, may be pregnant, or are planning to become pregnant.  · If you drink alcohol:  ? Limit how much you use to:  § 0-1 drink a day for women.  § 0-2 drinks a day for men.  ? Know how much alcohol is in your drink. In the [place] one drink equals one 12 oz bottle of beer (355 mL), one 5 oz glass of wine (148 mL), or one 1½ oz glass of hard liquor (44 mL).  General instructions  · Work with your doctor to treat other problems you may have, such as diabetes or high blood pressure.  · Get screened for depression. Get treatment if needed.  · Keep your vaccines up to date. Get the flu shot (influenza vaccine) every year.  · Keep all follow-up visits as told by your doctor. This is important.  Contact a doctor if:  · You feel very sad.  · You have trouble doing your daily activities.  Get help right away if:  · You have sudden, unexplained discomfort in your chest, arms, back, neck, jaw, or upper body.  · You have shortness of breath.  · You have sudden sweating or clammy skin.  · You feel like you may vomit.  · You vomit.  · You feel tired or weak.  · You get light-headed or dizzy.  · You feel your heart beating fast.  · You feel your heart skipping beats.  · You have blood pressure that is higher than 180/120.  These symptoms may be an emergency. Do not wait to see if the symptoms will go away. Get medical help right away. Call your local emergency services (911 in the [place] Do not drive yourself to the hospital.  Summary  · A heart attack occurs when blood and oxygen supply to the heart is cut off.  · Do not take NSAIDs unless your doctor says it is okay.  · Do not smoke. Avoid secondhand smoke.  · Exercise regularly. Ask your doctor about a cardiac rehab program.  This information is not intended to replace advice given to you by your health care provider. Make sure you discuss any questions you have with your health  "care provider.  Document Released: 06/18/2013 Document Revised: 03/30/2020 Document Reviewed: 03/30/2020  Elsevier Patient Education © 2020 [employer]        Low-Sodium Eating Plan  Sodium, which is an element that makes up salt, helps you maintain a healthy balance of fluids in your body. Too much sodium can increase your blood pressure and cause fluid and waste to be held in your body.  Your health care provider or dietitian may recommend following this plan if you have high blood pressure (hypertension), kidney disease, liver disease, or heart failure. Eating less sodium can help lower your blood pressure, reduce swelling, and protect your heart, liver, and kidneys.  What are tips for following this plan?  General guidelines  · Most people on this plan should limit their sodium intake to 1,500-2,000 mg (milligrams) of sodium each day.  Reading food labels    · The Nutrition Facts label lists the amount of sodium in one serving of the food. If you eat more than one serving, you must multiply the listed amount of sodium by the number of servings.  · Choose foods with less than 140 mg of sodium per serving.  · Avoid foods with 300 mg of sodium or more per serving.  Shopping  · Look for lower-sodium products, often labeled as \"low-sodium\" or \"no salt added.\"  · Always check the sodium content even if foods are labeled as \"unsalted\" or \"no salt added\".  · Buy fresh foods.  ? Avoid canned foods and premade or frozen meals.  ? Avoid canned, cured, or processed meats  · Buy breads that have less than 80 mg of sodium per slice.  Cooking  · Eat more home-cooked food and less restaurant, buffet, and fast food.  · Avoid adding salt when cooking. Use salt-free seasonings or herbs instead of table salt or sea salt. Check with your health care provider or pharmacist before using salt substitutes.  · Cook with plant-based oils, such as canola, sunflower, or olive oil.  Meal planning  · When eating at a restaurant, ask that " "your food be prepared with less salt or no salt, if possible.  · Avoid foods that contain MSG (monosodium glutamate). MSG is sometimes added to Chinese food, bouillon, and some canned foods.  What foods are recommended?  The items listed may not be a complete list. Talk with your dietitian about what dietary choices are best for you.  Grains  Low-sodium cereals, including oats, puffed wheat and rice, and shredded wheat. Low-sodium crackers. Unsalted rice. Unsalted pasta. Low-sodium bread. Whole-grain breads and whole-grain pasta.  Vegetables  Fresh or frozen vegetables. \"No salt added\" canned vegetables. \"No salt added\" tomato sauce and paste. Low-sodium or reduced-sodium tomato and vegetable juice.  Fruits  Fresh, frozen, or canned fruit. Fruit juice.  Meats and other protein foods  Fresh or frozen (no salt added) meat, poultry, seafood, and fish. Low-sodium canned tuna and salmon. Unsalted nuts. Dried peas, beans, and lentils without added salt. Unsalted canned beans. Eggs. Unsalted nut butters.  Dairy  Milk. Soy milk. Cheese that is naturally low in sodium, such as ricotta cheese, fresh mozzarella, or Swiss cheese Low-sodium or reduced-sodium cheese. Cream cheese. Yogurt.  Fats and oils  Unsalted butter. Unsalted margarine with no trans fat. Vegetable oils such as canola or olive oils.  Seasonings and other foods  Fresh and dried herbs and spices. Salt-free seasonings. Low-sodium mustard and ketchup. Sodium-free salad dressing. Sodium-free light mayonnaise. Fresh or refrigerated horseradish. Lemon juice. Vinegar. Homemade, reduced-sodium, or low-sodium soups. Unsalted popcorn and pretzels. Low-salt or salt-free chips.  What foods are not recommended?  The items listed may not be a complete list. Talk with your dietitian about what dietary choices are best for you.  Grains  Instant hot cereals. Bread stuffing, pancake, and biscuit mixes. Croutons. Seasoned rice or pasta mixes. Noodle soup cups. Boxed or frozen " macaroni and cheese. Regular salted crackers. Self-rising flour.  Vegetables  Sauerkraut, pickled vegetables, and relishes. Olives. French fries. Onion rings. Regular canned vegetables (not low-sodium or reduced-sodium). Regular canned tomato sauce and paste (not low-sodium or reduced-sodium). Regular tomato and vegetable juice (not low-sodium or reduced-sodium). Frozen vegetables in sauces.  Meats and other protein foods  Meat or fish that is salted, canned, smoked, spiced, or pickled. Wade, ham, sausage, hotdogs, corned beef, chipped beef, packaged lunch meats, salt pork, jerky, pickled herring, anchovies, regular canned tuna, sardines, salted nuts.  Dairy  Processed cheese and cheese spreads. Cheese curds. Blue cheese. Feta cheese. String cheese. Regular cottage cheese. Buttermilk. Canned milk.  Fats and oils  Salted butter. Regular margarine. Ghee. Wade fat.  Seasonings and other foods  Onion salt, garlic salt, seasoned salt, table salt, and sea salt. Canned and packaged gravies. Worcestershire sauce. Tartar sauce. Barbecue sauce. Teriyaki sauce. Soy sauce, including reduced-sodium. Steak sauce. Fish sauce. Oyster sauce. Cocktail sauce. Horseradish that you find on the shelf. Regular ketchup and mustard. Meat flavorings and tenderizers. Bouillon cubes. Hot sauce and Tabasco sauce. Premade or packaged marinades. Premade or packaged taco seasonings. Relishes. Regular salad dressings. Salsa. Potato and tortilla chips. Corn chips and puffs. Salted popcorn and pretzels. Canned or dried soups. Pizza. Frozen entrees and pot pies.  Summary  · Eating less sodium can help lower your blood pressure, reduce swelling, and protect your heart, liver, and kidneys.  · Most people on this plan should limit their sodium intake to 1,500-2,000 mg (milligrams) of sodium each day.  · Canned, boxed, and frozen foods are high in sodium. Restaurant foods, fast foods, and pizza are also very high in sodium. You also get sodium by adding  salt to food.  · Try to cook at home, eat more fresh fruits and vegetables, and eat less fast food, canned, processed, or prepared foods.  This information is not intended to replace advice given to you by your health care provider. Make sure you discuss any questions you have with your health care provider.  Document Released: 06/09/2003 Document Revised: 11/30/2018 Document Reviewed: 12/11/2017  [employer] Patient Education © 2020 [employer]          Cardiac Rehabilitation  What is cardiac rehabilitation?  Cardiac rehabilitation is a treatment program that helps improve the health and well-being of people who have heart problems. Cardiac rehabilitation includes exercise training, education, and counseling to help you get stronger and return to an active lifestyle. This program can help you get better faster and reduce any future hospital stays.  Why might I need cardiac rehabilitation?  Cardiac rehabilitation programs can help when you have or have had:  · A heart attack.  · Heart failure.  · Peripheral artery disease.  · Coronary artery disease.  · Angina.  · Lung or breathing problems.  Cardiac rehabilitation programs are also used when you have had:  · Coronary artery bypass graft surgery.  · Heart valve replacement.  · Heart stent placement.  · Heart transplant.  · Aneurysm repair.  What are the benefits of cardiac rehabilitation?  Cardiac rehabilitation can help you:  · Reduce problems like chest pain and trouble breathing.  · Change risk factors that contribute to heart disease, such as:  ? Smoking.  ? High blood pressure.  ? High cholesterol.  ? Diabetes.  ? Being inactive.  ? Weighing over 30% more than your ideal weight.  ? Diet.  · Improve your emotional outlook so you feel:  ? More hopeful.  ? Better about yourself.  ? More confident about taking care of yourself.  · Get support from health experts as well as other people with similar problems.  · Learn healthy ways to manage stress.  · Learn how to  manage and understand your medicines.  · Teach your family about your condition and how to participate in your recovery.  What happens in cardiac rehabilitation?  You will be assessed by a cardiac rehabilitation team. They will check your health history and do a physical exam. You may need blood tests, exercise stress tests, and other evaluations to make sure that you are ready to start cardiac rehabilitation.  The cardiac rehabilitation team works with you to make a plan based on your health and goals. Your program will be tailored to fit you and your needs and may change as you progress. You may work with a health care team that includes:  · Doctors.  · Nurses.  · Dietitians.  · Psychologists.  · Exercise specialists.  · Physical and occupational therapists.  What are the phases of cardiac rehabilitation?  A cardiac rehabilitation program is often divided into phases. You advance from one phase to the next.  Phase 1  This phase starts while you are still in the hospital. You may:  · Start by walking in your room and then in the coon.  · Do some simple exercises with a therapist.    Phase 2  This phase begins when you go home or to another facility. You will travel to a cardiac rehabilitation center or another place where rehabilitation is offered. This phase may last 8-12 weeks. During this phase:  · You will slowly increase your activity level while being closely watched by a nurse or therapist.  · You will have medical tests and exams to monitor your progress.  · Your exercises may include strength or resistance training along with activities that cause your heart to beat faster (aerobic exercises), such as walking on a treadmill.  · Your condition will determine how often and how long these sessions last.  · You may learn how to:  ? Cook heart-healthy meals.  ? Control your blood sugar, if this applies.  ? Stop smoking.  ? Manage your medicines. You may need help with scheduling or planning how and when to take  your medicines. If you have questions about your medicines, it is very important that you talk with your health care provider.    Phase 3  This phase continues for the rest of your life. In this phase:  · There will be less supervision.  · You may continue to participate in cardiac rehabilitation activities or become part of a group in your community.  · You may benefit from talking about your experience with other people who are facing similar challenges.  Follow these instructions at home:  · Take over-the-counter and prescription medicines only as told by your health care provider.  · Keep all follow-up visits as told by your health care provider. This is important.  Get help right away if:  · You have severe chest discomfort, especially if the pain is crushing or pressure-like and spreads to your arms, back, neck, or jaw. Do not wait to see if the pain will go away.  · You have weakness or numbness in your face, arms, or legs, especially on one side of the body.  · Your speech is slurred.  · You are confused.  · You have a sudden, severe headache or loss of vision.  · You have shortness of breath.  · You are sweating and have nausea.  · You feel dizzy or faint.  · You are fatigued.  These symptoms may represent a serious problem that is an emergency. Do not wait to see if the symptoms will go away. Get medical help right away. Call your local emergency services (911 in the [place] Do not drive yourself to the hospital.  Summary  · Cardiac rehabilitation is a treatment program that helps improve the health and well-being of people who have heart problems.  · A cardiac rehabilitation program is often divided into phases. You advance from one phase to the next.  · The cardiac rehabilitation team works with you to make a plan based on your health and goals.  · Cardiac rehabilitation includes exercise training, education, and counseling to help you get stronger and return to an active lifestyle.  This information is  not intended to replace advice given to you by your health care provider. Make sure you discuss any questions you have with your health care provider.  Document Released: 09/26/2009 Document Revised: 04/08/2020 Document Reviewed: 10/17/2019  Elsevier Patient Education © 2020 [employer] Inc.

## 2020-09-08 NOTE — RESPIRATORY CARE
" COPD EDUCATION by COPD CLINICAL EDUCATOR  9/8/2020 at 12:44 PM by Renetta Brandt, RRT     Patient reviewed by COPD education team. Patient denies history/diagnosis of COPD. Patient does not take any respiratory medications at home. Smoking Cessation Intervention and education completed, 5 minutes spent on smoking cessation education with patient    Provided smoking cessation packet with \"Tips to Quit\" and flyer for \"Free Smoking Cessation Classes\".     "

## 2020-09-08 NOTE — PROGRESS NOTES
Discharge instructions given to patient at bedside, verbalizes understanding and states plans for [facility], physical prescription for new medications provided, patient instructed to get filled as soon as possible. Individualized instruction anddischarge information provided on STEMI, low sodium diet, new and home medication review, post-discharge activity level, smoking/etoh cessation and worsening of symptoms needing follow-up care. Telemetry monitor/IV cathlon removed, monitor room notifed. All belongings accounted for, all questions answered at this time. Patient discharged home via bus.

## 2020-09-08 NOTE — PROGRESS NOTES
Received bedside report from RN, pt care assumed, VSS. Pt AAOx4 with no complaint of pain at this time. No signs of acute distress noted at this time. POC discussed with pt and verbalizes no questions. Pt denies any additional needs at this time. Bed in lowest position, pt educated on fall risk and verbalized understanding, call light within reach, hourly rounding initiated.

## 2020-09-08 NOTE — DISCHARGE PLANNING
Anticipated Discharge Disposition: Home.    Action: Patient being discharged home today. Bus pass given to BS RN to provide to patient upon discharge. [facility].     Barriers to Discharge: None.    Plan: D/C home.

## 2020-09-08 NOTE — DISCHARGE SUMMARY
"Discharge Summary    CHIEF COMPLAINT ON ADMISSION  No chief complaint on file.      Reason for Admission  EMS STEMI     Admission Date  9/7/2020    CODE STATUS  Full Code    HPI & HOSPITAL COURSE  For full details of admission please see the H&P of Dr. Simental dated the 9/7/2020, briefly, \"80 y.o. male with PMH of HTN, DLDs, [facility], CVA who presented 9/7/2020 with chest pain for the past 3 days.  He was diagnosed with STEMI and cardiology was consulted and the patient was immediately taken to angiogram.  By the time I saw the patient he already has stent in LAD.  Currently the patient is chest pain-free.  He stated that he do not have history of dementia and he lives with his wife.\"  [From the H&P of Dr. Simental]    Hospital course: Patient underwent percutaneous intervention where a 98% stenosed lesion was found to the left anterior descending artery, he underwent placement of a drug-eluting stent on 9/7/2020.  Procedure was uncomplicated.  Patient had resolution of his chest discomfort, patient was seen on hospital day 1, cleared for discharge by cardiology, ambulatory with bedside nurse, no acute distress and eager to return home.       Therefore, he is discharged in good and stable condition to home with close outpatient follow-up.    The patient recovered much more quickly than anticipated on admission.    Discharge Date  09/08/20      FOLLOW UP ITEMS POST DISCHARGE  [facility], for follow-up of our inpatient cardiology recommendations;  -Continue dual antiplatelet therapy at least 6 months, preferably for one year; aspirin 81/day and Brillanta 90 mg twice daily: stop home Plavix  -Continue atorvastatin 80 mg/day and losartan 50 mg/day  -Low-dose carvedilol, 3.125 mg twice daily added  -Outpatient cardiology follow-up  -Blood pressure goal less than 130/80 for management of ascending aortic aneurysm.    DISCHARGE DIAGNOSES  Active Problems:    Essential hypertension POA: Yes    Elevated brain natriuretic " peptide (BNP) level POA: Yes    Status post insertion of drug-eluting stent into left anterior descending (LAD) artery POA: Unknown      Overview: 3.0 x 15 mm Bertin SANTINO placed 9/7/2020 by Dr. Santacruz.    Resolved Problems:    ST elevation myocardial infarction involving left anterior descending (LAD) coronary artery (HCC) POA: Yes      FOLLOW UP  No future appointments.  [place] [id number] [id number] [phone]  Schedule an appointment as soon as possible for a visit today  For Cardiology outpatient followup.        MEDICATIONS ON DISCHARGE     Medication List      START taking these medications      Instructions   aspirin 81 MG EC tablet  Start taking on: September 9, 2020   Take 1 Tab by mouth every day.  Dose: 81 mg     carvedilol 3.125 MG Tabs  Commonly known as: COREG   Take 1 Tab by mouth 2 times a day, with meals.  Dose: 3.125 mg     ticagrelor 90 MG Tabs tablet  Commonly known as: BRILINTA   Take 1 Tab by mouth 2 Times a Day.  Dose: 90 mg        CHANGE how you take these medications      Instructions   amLODIPine 5 MG Tabs  What changed: how much to take  Commonly known as: NORVASC   Take 2 Tabs by mouth every day.  Dose: 10 mg        CONTINUE taking these medications      Instructions   atorvastatin 40 MG Tabs  Commonly known as: LIPITOR   Take 80 mg by mouth every evening.  Dose: 80 mg     BACITRACIN-POLYMYXIN B EX   Apply 1 Application to affected area(s) 2 Times a Day.  Dose: 1 Application     Cholecalciferol 1000 UNIT Caps   Take 1 Cap by mouth 2 Times a Day.  Dose: 1 Cap     losartan 50 MG Tabs  Commonly known as: COZAAR   Take 50 mg by mouth every day.  Dose: 50 mg     therapeutic multivitamin-minerals Tabs   Take 1 Tab by mouth every day.  Dose: 1 Tab     THIAMINE PO   Take 1 Tab by mouth every day.  Dose: 1 Tab     vitamin B-12 1000 MCG Tabs   Take 2,000 mcg by mouth every day.  Dose: 2,000 mcg        STOP taking these medications    clopidogrel 75 MG  Tabs  Commonly known as: PLAVIX            Allergies  No Known Allergies    DIET  Orders Placed This Encounter   Procedures   • Diet Order Cardiac     Standing Status:   Standing     Number of Occurrences:   1     Order Specific Question:   Diet:     Answer:   Cardiac [6]       ACTIVITY  As tolerated.  Weight bearing as tolerated    CONSULTATIONS  Cardiology, Satinder Smith MD.     PROCEDURES   PROCEDURE PERFORMED:  Selective coronary angiography of the native vessels  Left heart catheterization  Left ventriculogram  PCI of LAD SANTINO placement.    Supervision moderate sedation  With Dr. Santacruz on 9/7/2020.          LABORATORY  Lab Results   Component Value Date    SODIUM 132 (L) 09/08/2020    POTASSIUM 3.9 09/08/2020    CHLORIDE 99 09/08/2020    CO2 21 09/08/2020    GLUCOSE 98 09/08/2020    BUN 5 (L) 09/08/2020    CREATININE 0.89 09/08/2020        Lab Results   Component Value Date    WBC 10.5 09/08/2020    HEMOGLOBIN 14.7 09/08/2020    HEMATOCRIT 47.3 09/08/2020    PLATELETCT 292 09/08/2020        Total time of the discharge process exceeds 36 minutes.

## 2020-09-08 NOTE — PROGRESS NOTES
Cardiology Follow-up Consult Note    Date of note:    9/8/2020      Consulting Physician: Titus Dumont M.D.    Patient ID:  Name:   Bg Andrea     YOB: 1940  Age:   80 y.o.  male   MRN:   [id number]    CC: STEMI    ID: 80 y.o. male with past medical history of hypertension, hyperlipidemia, CVA on Plavix, who presented on 09/07/2020 to the ED with STEMI.  Patient was immediately sent for left heart cath and was found to have mid LAD lesion status post SANTINO.    Interim Events:  -No acute events overnight.  Patient denied any episodes of chest pain, shortness of breath, palpitation or other cardiopulmonary symptoms  -Vital signs are within normal limits, SBP in 140s 150s  -Creatinine stable.  -Echo on 09/07 showed preserved EF of 65%    ROS  Constitutional: Negative for chills, fever and weight loss.   HENT: Negative for congestion and nosebleeds.    Eyes: Negative for blurred vision, pain, discharge and redness.   Respiratory: Negative for cough, sputum production, shortness of breath and stridor.    Cardiovascular: Negative for chest pain, palpitations and orthopnea.   Gastrointestinal: Negative for abdominal pain, diarrhea, heartburn, nausea and vomiting.   Genitourinary: Negative for dysuria, frequency and urgency.   Musculoskeletal: Negative for back pain, myalgias and neck pain.   Skin: Negative for itching and rash.   Neurological: Negative for dizziness, focal weakness, seizures and headaches.       Past medical, surgical, social, and family history reviewed and unchanged from admission except as noted in assessment and plan.    Medications: Reviewed in MAR  Current Facility-Administered Medications   Medication Dose Frequency Provider Last Rate Last Dose   • aspirin EC (ECOTRIN) tablet 81 mg  81 mg DAILY Bobby Santacruz M.D.   81 mg at 09/08/20 0610   • acetaminophen (TYLENOL) tablet 650 mg  650 mg Q6HRS PRN Bobby Santacruz M.D.       • ticagrelor (BRILINTA) tablet 90 mg  90 mg BID  "Bobby Santacruz M.D.   90 mg at 09/08/20 0610   • amLODIPine (NORVASC) tablet 5 mg  5 mg DAILY Chidi Simental M.D.   5 mg at 09/08/20 0609   • atorvastatin (LIPITOR) tablet 80 mg  80 mg Nightly Chidi Simental M.D.   80 mg at 09/07/20 2146   • losartan (COZAAR) tablet 50 mg  50 mg DAILY Chidi Simental M.D.   50 mg at 09/08/20 0610   • senna-docusate (PERICOLACE or SENOKOT S) 8.6-50 MG per tablet 2 Tab  2 Tab BID Chidi Simental M.D.   2 Tab at 09/08/20 0610    And   • polyethylene glycol/lytes (MIRALAX) PACKET 1 Packet  1 Packet QDAY PRN Chidi Simental M.D.        And   • magnesium hydroxide (MILK OF MAGNESIA) suspension 30 mL  30 mL QDAY PRN Chidi Simental M.D.        And   • bisacodyl (DULCOLAX) suppository 10 mg  10 mg QDAY PRN Chidi Simental M.D.       • ondansetron (ZOFRAN) syringe/vial injection 4 mg  4 mg Q4HRS PRN Chidi Simental M.D.       • ondansetron (ZOFRAN ODT) dispertab 4 mg  4 mg Q4HRS PRN Chidi Simental M.D.       Last reviewed on 9/7/2020  2:16 PM by Beronica Forrest R.N.    No Known Allergies    Physical Exam  Body mass index is 24.69 kg/m². /89   Pulse 78   Temp 36 °C (96.8 °F) (Temporal)   Resp 18   Ht 1.702 m (5' 7\")   Wt 71.5 kg (157 lb 10.1 oz)   SpO2 97%    Vitals:    09/07/20 2110 09/08/20 0007 09/08/20 0437 09/08/20 0729   BP:  125/78 141/77 156/89   Pulse:  65 63 78   Resp:  18 18 18   Temp:  36.1 °C (97 °F) 36.2 °C (97.1 °F) 36 °C (96.8 °F)   TempSrc:  Temporal Temporal Temporal   SpO2:  91% 95% 97%   Weight: 71.5 kg (157 lb 10.1 oz)      Height:        Oxygen Therapy:  Pulse Oximetry: 97 %, O2 (LPM): 0, O2 Delivery Device: None - Room Air    General: no apparent distress  Eyes: nl conjunctiva  ENT: OP clear  Neck: no JVD   Lungs: normal respiratory effort, CTAB  Heart: RRR, no murmurs, no rubs or gallops,   EXT: edema bilateral lower extremities. 2+ pedal pulses. no cyanosis  Abdomen: soft, non tender, non distended,  Neurological: No focal deficits  Psychiatric: Appropriate affect, A/O x " 3  Skin: Warm extremities    Labs (personally reviewed and notable for):   Recent Results (from the past 24 hour(s))   EKG STAT    Collection Time: 20  2:06 PM   Result Value Ref Range    Report       [facility]    Test Date:  2020  Pt Name:    LOVE BRAN               Department: ER  MRN:        [id number]                      Room:       [facility]  Gender:     Male                         Technician: [facility]  :        1940                   Requested By:ASHLEY SANTACRUZ  Order #:    [id number]                    Reading MD: Ashley Santacruz MD    Measurements  Intervals                                Axis  Rate:       55                           P:          41  WY:         176                          QRS:        62  QRSD:       86                           T:          14  QT:         424  QTc:        406    Interpretive Statements  SINUS BRADYCARDIA  NONSPECIFIC T ABNORMALITIES, ANTERIOR LEADS  Compared to ECG 2020 09:57:51  T-wave abnormality now present  Sinus tachycardia no longer present  Ventricular premature complex(es) no longer present  Myocardial infarct finding no longer present  ST (T wave) deviation no longer present  Electronically Signed On 2020 15:08:41 PDT by Ashley Santacruz MD     EC-ECHOCARDIOGRAM COMPLETE W/O CONT    Collection Time: 20  4:00 PM   Result Value Ref Range    Eject.Frac. MOD BP 62.89     Eject.Frac. MOD 4C 64.63     Eject.Frac. MOD 2C 62.57     Left Ventrical Ejection Fraction 65    Basic Metabolic Panel (BMP)    Collection Time: 20  4:42 AM   Result Value Ref Range    Sodium 132 (L) 135 - 145 mmol/L    Potassium 3.9 3.6 - 5.5 mmol/L    Chloride 99 96 - 112 mmol/L    Co2 21 20 - 33 mmol/L    Glucose 98 65 - 99 mg/dL    Bun 5 (L) 8 - 22 mg/dL    Creatinine 0.89 0.50 - 1.40 mg/dL    Calcium 8.8 8.5 - 10.5 mg/dL    Anion Gap 12.0 7.0 - 16.0   CBC without differential    Collection Time: 20  4:42 AM   Result Value Ref Range    WBC  10.5 4.8 - 10.8 K/uL    RBC 5.68 4.70 - 6.10 M/uL    Hemoglobin 14.7 14.0 - 18.0 g/dL    Hematocrit 47.3 42.0 - 52.0 %    MCV 83.3 81.4 - 97.8 fL    MCH 25.9 (L) 27.0 - 33.0 pg    MCHC 31.1 (L) 33.7 - 35.3 g/dL    RDW 58.8 (H) 35.9 - 50.0 fL    Platelet Count 292 164 - 446 K/uL    MPV 10.4 9.0 - 12.9 fL   ESTIMATED GFR    Collection Time: 09/08/20  4:42 AM   Result Value Ref Range    GFR If African American >60 >60 mL/min/1.73 m 2    GFR If Non African American >60 >60 mL/min/1.73 m 2       Cardiac Imaging and Procedures Review:    EKG and telemetry tracings personally reviewed    Impression and Medical Decision Making:  Active Problems:    Essential hypertension POA: Yes    ST elevation myocardial infarction involving left anterior descending (LAD) coronary artery (HCC) POA: Yes    Elevated brain natriuretic peptide (BNP) level POA: Yes  Resolved Problems:    * No resolved hospital problems. *      Assessment and Plan:     # STEMI    -S/P PCI and SANTINO to mid LAD on 09/07/2020  -Denies any chest pain or other cardiopulmonary symptoms.  Vital signs are within normal limits, creatinine stable.  -Telemetry did not show any arrhythmia or significant ischemic ST segment, T wave changes.  -Echo showed preserved ejection fraction of 65% without any wall motion or valvular abnormalities.    -Continue dual antiplatelet therapy at least 6 months, preferably for one year; aspirin 81/day and Brillanta 90 mg twice daily: stop home Plavix  -Continue atorvastatin 80 mg/day and losartan 50 mg/day  -Low-dose carvedilol, 3.125 mg twice daily added  -Outpatient cardiology follow-up    # Ascending aortic aneurysm    -Ascending aorta 4.4 cm in size  -Blood pressure goal <130/80  -Continue current blood pressure medications.    Thank you for allowing me to participate in the care of this patient.  Please call or text via [employer] if clarification would be useful.

## 2020-09-08 NOTE — THERAPY
"Pt is s/p STEMI with PCI presenting to PT cardiac rehab phase I eval quite concerned about his release time. PT repeatedly attempted basic information regarding activity and a post-STEMI heart, but pt was quite difficult to re-direct. However, pt did report that he does walk for exercise \"down to the gazebo\" and was receptive to information about the Talk Test. PT attempted more education, however, pt repeated that he walks to the gazebo and his continued interest in going home soon. Distributed home walking program handout and attempted to emphasize energy conservation/pacing within the setting of activity.   "

## 2020-09-08 NOTE — PROGRESS NOTES
Patient ambulated in hallway as requested by cardiology. Patient tolerated well, no complaint of chest pain, dizziness, etc. Steady gait.

## 2020-10-06 ENCOUNTER — TELEPHONE (OUTPATIENT)
Dept: CARDIOLOGY | Facility: PHYSICIAN GROUP | Age: 80
End: 2020-10-06

## 2020-10-06 NOTE — TELEPHONE ENCOUNTER
Spoke with pt's wife, Mandi. Pt has never seen a cardiologist in the past. Only when he has been admitted within the ER. Imaging & blood work is up to date within chart. Confirmed appointment time and date with wife, verbally understood.

## 2020-10-09 PROBLEM — I25.9 ISCHEMIC HEART DISEASE DUE TO CORONARY ARTERY OBSTRUCTION (HCC): Status: ACTIVE | Noted: 2020-10-09

## 2020-10-09 PROBLEM — I25.2 HISTORY OF ST ELEVATION MYOCARDIAL INFARCTION (STEMI): Status: ACTIVE | Noted: 2020-10-09

## 2020-10-09 PROBLEM — I24.0 ISCHEMIC HEART DISEASE DUE TO CORONARY ARTERY OBSTRUCTION (HCC): Status: ACTIVE | Noted: 2020-10-09

## 2020-10-09 NOTE — PROGRESS NOTES
Cardiology Initial Consultation Note    Date of note:    10/9/2020    Primary Care Provider: No primary care provider on file.  Referring Provider: No ref. provider found     Patient Name: Bg Andrea     YOB: 1940  MRN:              [id number]    Chief Complaint: Establish care    History of Present Illness: Mr. Bg Andrea is a 80 y.o. male whose current medical problems include mild dementia, CAD status post anterior STEMI with PCI to LAD without any residual lesions (September 7, 2020), stroke without residual deficits (December 2019), and hypertension who is here for cardiac consultation to establish care after STEMI.    Today, the patient presents with a caregiver given mild dementia.  He is able to converse however.  Today, he reports doing well.  Denies any chest pain or shortness of breath.  He walks his dog 2 times a day.  He denies any orthopnea, PND, or leg swelling.  No palpitations.      Cardiovascular Risk Factors:  1. Smoking status: Former smoker  2. Type II Diabetes Mellitus:   Lab Results   Component Value Date/Time    HBA1C 5.5 12/26/2018 07:51 PM     3. Hypertension: Controlled  4. Dyslipidemia:   Cholesterol,Tot   Date Value Ref Range Status   12/26/2018 150 100 - 199 mg/dL Final     LDL   Date Value Ref Range Status   12/26/2018 86 <100 mg/dL Final     HDL   Date Value Ref Range Status   12/26/2018 39 (A) >=40 mg/dL Final     Triglycerides   Date Value Ref Range Status   12/26/2018 123 0 - 149 mg/dL Final     5. Family history of early Coronary Artery Disease in a first degree relative (Male less than 55 years of age; Female less than 65 years of age): None  6.  Obesity and/or Metabolic Syndrome: BMI 24.1  7. Sedentary lifestyle: Patient walks his dog twice a day.     Review of Systems   Constitution: Negative for fever, malaise/fatigue and night sweats.   Cardiovascular: Negative for chest pain, dyspnea on exertion, irregular heartbeat, leg swelling, near-syncope,  orthopnea, palpitations, paroxysmal nocturnal dyspnea and syncope.   Respiratory: Negative for cough, shortness of breath and wheezing.    Gastrointestinal: Negative for abdominal pain, diarrhea, nausea and vomiting.   Neurological: Negative for dizziness, focal weakness and weakness.         All other systems reviewed and discussed using a comprehensive questionnaire and are negative.       Past Medical History:   Diagnosis Date   • Cancer ([facility])     skin cancer on facial   • COPD (chronic obstructive pulmonary disease) ([facility])    • Dementia ([facility]) 12/26/2018   • Hypertension    • Severe aortic stenosis    • Severe aortic stenosis 7/3/2018   • ST elevation myocardial infarction involving left anterior descending (LAD) coronary artery ([facility]) 9/7/2020   • Stroke ([facility])     6/30/18         Past Surgical History:   Procedure Laterality Date   • LOBECTOMY Left    • OTHER      right hand finger   • OTHER ORTHOPEDIC SURGERY      back sx x3         Current Outpatient Medications   Medication Sig Dispense Refill   • ticagrelor (BRILINTA) 90 MG Tab tablet Take 1 Tab by mouth 2 Times a Day. 60 Tab 0   • carvedilol (COREG) 3.125 MG Tab Take 1 Tab by mouth 2 times a day, with meals. 60 Tab 0   • amLODIPine (NORVASC) 5 MG Tab Take 2 Tabs by mouth every day. 30 Tab 0   • aspirin EC 81 MG EC tablet Take 1 Tab by mouth every day. 100 Tab 0   • atorvastatin (LIPITOR) 40 MG Tab Take 80 mg by mouth every evening.     • losartan (COZAAR) 50 MG Tab Take 50 mg by mouth every day.     • Cholecalciferol 1000 UNIT Cap Take 1 Cap by mouth 2 Times a Day.     • therapeutic multivitamin-minerals (THERAGRAN-M) Tab Take 1 Tab by mouth every day.     • Thiamine HCl (THIAMINE PO) Take 1 Tab by mouth every day.     • BACITRACIN-POLYMYXIN B EX Apply 1 Application to affected area(s) 2 Times a Day.     • Cyanocobalamin (VITAMIN B-12) 1000 MCG Tab Take 2,000 mcg by mouth every day.       No current facility-administered medications for this visit.   "        No Known Allergies      Family History   Problem Relation Age of Onset   • No Known Problems Mother    • No Known Problems Father    • Cancer Neg Hx    • Heart Disease Neg Hx          Social History     Socioeconomic History   • Marital status:      Spouse name: Not on file   • Number of children: Not on file   • Years of education: Not on file   • Highest education level: Not on file   Occupational History   • Not on file   Social Needs   • Financial resource strain: Not on file   • Food insecurity     Worry: Not on file     Inability: Not on file   • Transportation needs     Medical: Not on file     Non-medical: Not on file   Tobacco Use   • Smoking status: Former Smoker     Packs/day: 1.00     Years: 60.00     Pack years: 60.00     Types: Cigarettes     Start date: 7/2/1950   • Smokeless tobacco: Never Used   Substance and Sexual Activity   • Alcohol use: No     Comment: Quit 4 yrs ago. Rarely drank.    • Drug use: No   • Sexual activity: Not on file   Lifestyle   • Physical activity     Days per week: Not on file     Minutes per session: Not on file   • Stress: Not on file   Relationships   • Social connections     Talks on phone: Not on file     Gets together: Not on file     Attends Buddhist service: Not on file     Active member of club or organization: Not on file     Attends meetings of clubs or organizations: Not on file     Relationship status: Not on file   • Intimate partner violence     Fear of current or ex partner: Not on file     Emotionally abused: Not on file     Physically abused: Not on file     Forced sexual activity: Not on file   Other Topics Concern   • Not on file   Social History Narrative   • Not on file         Physical Exam:  Ambulatory Vitals  /54 (BP Location: Left arm)   Pulse 78   Ht 1.702 m (5' 7\")   Wt 69.9 kg (154 lb)   SpO2 94%    Oxygen Therapy:  Pulse Oximetry: 94 %  BP Readings from Last 4 Encounters:   10/15/20 108/54   09/08/20 141/87   12/28/18 " (!) 168/79   07/05/18 149/76       Weight/BMI: Body mass index is 24.12 kg/m².  Wt Readings from Last 4 Encounters:   10/15/20 69.9 kg (154 lb)   09/07/20 71.5 kg (157 lb 10.1 oz)   12/26/18 72.8 kg (160 lb 7.9 oz)   07/03/18 77.9 kg (171 lb 11.8 oz)         General: Well appearing and in no apparent distress  Eyes: nl conjunctiva, no icteric sclera  ENT: wearing a mask, normal external appearance of ears  Neck: no visible JVP,  no carotid bruits  Lungs: normal respiratory effort, CTAB  Heart: RRR, no murmurs, no rubs or gallops,  no edema bilateral lower extremities. No LV/RV heave on cardiac palpatation. + bilateral radial pulses.  + bilateral dp pulses.   Abdomen: soft, non tender, non distended, no masses, normal bowel sounds.  No HSM.  Extremities/MSK: no clubbing, no cyanosis  Neurological: No focal sensory deficits  Psychiatric: Appropriate affect, A/O x 3, intact judgement and insight  Skin: Warm extremities      Lab Data Review:  Lab Results   Component Value Date/Time    CHOLSTRLTOT 150 12/26/2018 06:50 PM    LDL 86 12/26/2018 06:50 PM    HDL 39 (A) 12/26/2018 06:50 PM    TRIGLYCERIDE 123 12/26/2018 06:50 PM       Lab Results   Component Value Date/Time    SODIUM 132 (L) 09/08/2020 04:42 AM    POTASSIUM 3.9 09/08/2020 04:42 AM    CHLORIDE 99 09/08/2020 04:42 AM    CO2 21 09/08/2020 04:42 AM    GLUCOSE 98 09/08/2020 04:42 AM    BUN 5 (L) 09/08/2020 04:42 AM    CREATININE 0.89 09/08/2020 04:42 AM     Lab Results   Component Value Date/Time    ALKPHOSPHAT 74 09/07/2020 10:01 AM    ASTSGOT 12 09/07/2020 10:01 AM    ALTSGPT 8 09/07/2020 10:01 AM    TBILIRUBIN 0.6 09/07/2020 10:01 AM      Lab Results   Component Value Date/Time    WBC 10.5 09/08/2020 04:42 AM     Lab Results   Component Value Date/Time    HBA1C 5.5 12/26/2018 07:51 PM         Cardiac Imaging and Procedures Review:    EKG dated September 7, 2020: My personal interpretation is sinus rhythm, minimal ST depressions diffusely.    Echo dated September  7, 2020:   CONCLUSIONS  Normal right and left ventricular size and function.   Left ventricular ejection fraction is visually estimated to be 65%.  Grade II diastolic dysfunction.  Mildly dilated left atrium.  No significant valvular stenosis or regurgitation.   Mildly elevated estimated right atrial pressure.   Estimated right ventricular systolic pressure is 30 mmHg.  Ascending aorta is dilated with a diameter of 4.4 cm.     No prior study is available for comparison.       [facility] (September 7, 2020):   FINDINGS:  I. HEMODYNAMICS:               Ao: 121/57 mmHg              LEDP: 10No mmHg              Gradient on LV pullback: No     II. LEFT VENTRICULOGRAM:              LVEF JESUS PROJECTION: 65 %                III. CORONARY ANGIOGRAPHY:  Left Main: Large caliber mild nonobstructive CAD trifurcating.  Left Anterior Descending: Moderate caliber vessel terminating at the apex gives rise to several diagonals.  There is a 98% focal culprit thrombotic stenosis in its midportion and a Lugo 1, 1, 0 configuration with a diagonal.  Postintervention there is 0% residual stenosis in the stented segment JEANETH-3 flow.  There is moderate nonobstructive proximal calcific CAD in this vessel as well.  Left Circumflex: Moderate caliber nondominant supplying a large first obtuse marginal/ramus intermedius.  No significant CAD in these territories.  Right Coronary: Large and dominant with a nonobstructive 50% midportion stenosis and diffuse calcific atherosclerosis.     COMPLICATIONS: none apparent     CONCLUSIONS:  1.  98% focal culprit mid LAD stenosis Lugo 1, 1, 0 bifurcation  2.  Otherwise nonobstructive CAD  3.  LVEF 65 to 70% prior to intervention  4.  Successful PCI LAD culprit (3.0 x 15 mm [place] SANTINO), excellent result    Assessment & Plan     1. Essential hypertension     2. History of ST elevation myocardial infarction (STEMI)     3. Cerebrovascular accident (CVA) due to embolism of right carotid artery (HCC)  LIPID PANEL   4.  Ischemic heart disease due to coronary artery obstruction (HCC)  EC-ECHOCARDIOGRAM COMPLETE W/O CONT    LIPID PANEL   5. Dyslipidemia  LIPID PANEL   6. Ascending aorta dilatation (HCC)  EC-ECHOCARDIOGRAM COMPLETE W/O CONT         Shared Medical Decision Making:    Ischemic heart disease secondary to coronary artery obstruction  CAD due to STEMI status post PCI to LAD September 2020  -Continue aspirin 81 mg and ticagrelor 90 mg twice daily (stop date for ticagrelor is September 2021).  -Continue atorvastatin 80 mg daily.  -Repeat echocardiogram prior to next visit monitor LVEF.  -Check lipid panel next visit  -Patient and caregiver are instructed to bring a list of medications next visit    Hypertension   Blood pressure well controlled today  -Continue losartan 50 mg daily, carvedilol 3.125 twice daily, amlodipine 10 mg daily    CVA  -Continue aspirin and atorvastatin as above.    Dilated ascending aorta, 4.4 cm  - repeat echocardigram as above    All of the patient's excellent questions were answered to the best of my knowledge and to his satisfaction.  It was a pleasure seeing Mr. Bg Andrea in my clinic today. Return in about 3 months (around 1/15/2021). Patient is aware to call the cardiology clinic with any questions or concerns.      Michelle Dozier MD  [facility]  [facility], [address]  [address] [address]  Phone: [phone]  Fax: [phone]

## 2020-10-15 ENCOUNTER — OFFICE VISIT (OUTPATIENT)
Dept: CARDIOLOGY | Facility: MEDICAL CENTER | Age: 80
End: 2020-10-15
Payer: COMMERCIAL

## 2020-10-15 VITALS
HEIGHT: 67 IN | BODY MASS INDEX: 24.17 KG/M2 | SYSTOLIC BLOOD PRESSURE: 108 MMHG | DIASTOLIC BLOOD PRESSURE: 54 MMHG | HEART RATE: 78 BPM | OXYGEN SATURATION: 94 % | WEIGHT: 154 LBS

## 2020-10-15 DIAGNOSIS — I63.131 CEREBROVASCULAR ACCIDENT (CVA) DUE TO EMBOLISM OF RIGHT CAROTID ARTERY (HCC): ICD-10-CM

## 2020-10-15 DIAGNOSIS — I25.9 ISCHEMIC HEART DISEASE DUE TO CORONARY ARTERY OBSTRUCTION (HCC): ICD-10-CM

## 2020-10-15 DIAGNOSIS — I10 ESSENTIAL HYPERTENSION: ICD-10-CM

## 2020-10-15 DIAGNOSIS — E78.5 DYSLIPIDEMIA: ICD-10-CM

## 2020-10-15 DIAGNOSIS — I25.2 HISTORY OF ST ELEVATION MYOCARDIAL INFARCTION (STEMI): ICD-10-CM

## 2020-10-15 DIAGNOSIS — I77.810 ASCENDING AORTA DILATATION (HCC): ICD-10-CM

## 2020-10-15 DIAGNOSIS — I24.0 ISCHEMIC HEART DISEASE DUE TO CORONARY ARTERY OBSTRUCTION (HCC): ICD-10-CM

## 2020-10-15 PROCEDURE — 99204 OFFICE O/P NEW MOD 45 MIN: CPT | Performed by: STUDENT IN AN ORGANIZED HEALTH CARE EDUCATION/TRAINING PROGRAM

## 2020-10-15 ASSESSMENT — ENCOUNTER SYMPTOMS
PALPITATIONS: 0
COUGH: 0
NIGHT SWEATS: 0
DYSPNEA ON EXERTION: 0
FOCAL WEAKNESS: 0
NEAR-SYNCOPE: 0
VOMITING: 0
DIARRHEA: 0
ORTHOPNEA: 0
NAUSEA: 0
PND: 0
WEAKNESS: 0
WHEEZING: 0
IRREGULAR HEARTBEAT: 0
ABDOMINAL PAIN: 0
FEVER: 0
DIZZINESS: 0
SYNCOPE: 0
SHORTNESS OF BREATH: 0

## 2020-10-15 ASSESSMENT — FIBROSIS 4 INDEX: FIB4 SCORE: 1.19

## 2020-10-15 NOTE — PATIENT INSTRUCTIONS
Schedule echocardiogram prior to your next visit.     For your next visit, please bring a list of medications you take with all the doses and frequency.

## 2020-10-22 ENCOUNTER — HOSPITAL ENCOUNTER (OUTPATIENT)
Facility: MEDICAL CENTER | Age: 80
End: 2020-10-23
Attending: EMERGENCY MEDICINE | Admitting: INTERNAL MEDICINE
Payer: COMMERCIAL

## 2020-10-22 ENCOUNTER — APPOINTMENT (OUTPATIENT)
Dept: RADIOLOGY | Facility: MEDICAL CENTER | Age: 80
End: 2020-10-22
Attending: EMERGENCY MEDICINE
Payer: COMMERCIAL

## 2020-10-22 DIAGNOSIS — R07.9 CHEST PAIN, UNSPECIFIED TYPE: ICD-10-CM

## 2020-10-22 PROBLEM — U07.1 COVID-19 VIRUS INFECTION: Status: ACTIVE | Noted: 2020-10-22

## 2020-10-22 PROBLEM — E88.09 HYPOALBUMINEMIA: Status: ACTIVE | Noted: 2020-10-22

## 2020-10-22 PROBLEM — E83.51 HYPOCALCEMIA: Status: ACTIVE | Noted: 2020-10-22

## 2020-10-22 PROBLEM — J12.82 PNEUMONIA DUE TO COVID-19 VIRUS: Status: ACTIVE | Noted: 2020-10-22

## 2020-10-22 LAB
25(OH)D3 SERPL-MCNC: 38 NG/ML (ref 30–100)
ALBUMIN SERPL BCP-MCNC: 2.3 G/DL (ref 3.2–4.9)
ALBUMIN/GLOB SERPL: 1.1 G/DL
ALP SERPL-CCNC: 63 U/L (ref 30–99)
ALT SERPL-CCNC: 9 U/L (ref 2–50)
ANION GAP SERPL CALC-SCNC: 11 MMOL/L (ref 7–16)
ANISOCYTOSIS BLD QL SMEAR: ABNORMAL
AST SERPL-CCNC: 17 U/L (ref 12–45)
BASOPHILS # BLD AUTO: 0.5 % (ref 0–1.8)
BASOPHILS # BLD: 0.05 K/UL (ref 0–0.12)
BILIRUB SERPL-MCNC: 0.5 MG/DL (ref 0.1–1.5)
BUN SERPL-MCNC: 14 MG/DL (ref 8–22)
BURR CELLS BLD QL SMEAR: NORMAL
CALCIUM SERPL-MCNC: 6.1 MG/DL (ref 8.5–10.5)
CHLORIDE SERPL-SCNC: 112 MMOL/L (ref 96–112)
CO2 SERPL-SCNC: 13 MMOL/L (ref 20–33)
COMMENT 1642: NORMAL
COVID ORDER STATUS COVID19: NORMAL
CREAT SERPL-MCNC: 0.85 MG/DL (ref 0.5–1.4)
EKG IMPRESSION: NORMAL
EOSINOPHIL # BLD AUTO: 0.25 K/UL (ref 0–0.51)
EOSINOPHIL NFR BLD: 2.7 % (ref 0–6.9)
ERYTHROCYTE [DISTWIDTH] IN BLOOD BY AUTOMATED COUNT: 58.5 FL (ref 35.9–50)
GLOBULIN SER CALC-MCNC: 2.1 G/DL (ref 1.9–3.5)
GLUCOSE SERPL-MCNC: 85 MG/DL (ref 65–99)
HCT VFR BLD AUTO: 44 % (ref 42–52)
HGB BLD-MCNC: 13.2 G/DL (ref 14–18)
IMM GRANULOCYTES # BLD AUTO: 0.16 K/UL (ref 0–0.11)
IMM GRANULOCYTES NFR BLD AUTO: 1.7 % (ref 0–0.9)
LYMPHOCYTES # BLD AUTO: 0.77 K/UL (ref 1–4.8)
LYMPHOCYTES NFR BLD: 8.3 % (ref 22–41)
MAGNESIUM SERPL-MCNC: 1.3 MG/DL (ref 1.5–2.5)
MCH RBC QN AUTO: 25.7 PG (ref 27–33)
MCHC RBC AUTO-ENTMCNC: 30 G/DL (ref 33.7–35.3)
MCV RBC AUTO: 85.8 FL (ref 81.4–97.8)
MONOCYTES # BLD AUTO: 0.93 K/UL (ref 0–0.85)
MONOCYTES NFR BLD AUTO: 10.1 % (ref 0–13.4)
MORPHOLOGY BLD-IMP: NORMAL
NEUTROPHILS # BLD AUTO: 7.09 K/UL (ref 1.82–7.42)
NEUTROPHILS NFR BLD: 76.7 % (ref 44–72)
NRBC # BLD AUTO: 0 K/UL
NRBC BLD-RTO: 0 /100 WBC
OVALOCYTES BLD QL SMEAR: NORMAL
PLATELET # BLD AUTO: 370 K/UL (ref 164–446)
PLATELET BLD QL SMEAR: NORMAL
PMV BLD AUTO: 10.6 FL (ref 9–12.9)
POIKILOCYTOSIS BLD QL SMEAR: NORMAL
POTASSIUM SERPL-SCNC: 3.8 MMOL/L (ref 3.6–5.5)
PREALB SERPL-MCNC: 9.7 MG/DL (ref 18–38)
PROT SERPL-MCNC: 4.4 G/DL (ref 6–8.2)
RBC # BLD AUTO: 5.13 M/UL (ref 4.7–6.1)
RBC BLD AUTO: PRESENT
SARS-COV-2 RNA RESP QL NAA+PROBE: DETECTED
SCHISTOCYTES BLD QL SMEAR: NORMAL
SODIUM SERPL-SCNC: 136 MMOL/L (ref 135–145)
SPECIMEN SOURCE: ABNORMAL
TROPONIN T SERPL-MCNC: 15 NG/L (ref 6–19)
TROPONIN T SERPL-MCNC: 19 NG/L (ref 6–19)
WBC # BLD AUTO: 9.3 K/UL (ref 4.8–10.8)

## 2020-10-22 PROCEDURE — 96372 THER/PROPH/DIAG INJ SC/IM: CPT

## 2020-10-22 PROCEDURE — 85025 COMPLETE CBC W/AUTO DIFF WBC: CPT

## 2020-10-22 PROCEDURE — 36415 COLL VENOUS BLD VENIPUNCTURE: CPT

## 2020-10-22 PROCEDURE — A9270 NON-COVERED ITEM OR SERVICE: HCPCS | Performed by: STUDENT IN AN ORGANIZED HEALTH CARE EDUCATION/TRAINING PROGRAM

## 2020-10-22 PROCEDURE — 80053 COMPREHEN METABOLIC PANEL: CPT

## 2020-10-22 PROCEDURE — 99285 EMERGENCY DEPT VISIT HI MDM: CPT

## 2020-10-22 PROCEDURE — 700111 HCHG RX REV CODE 636 W/ 250 OVERRIDE (IP): Performed by: STUDENT IN AN ORGANIZED HEALTH CARE EDUCATION/TRAINING PROGRAM

## 2020-10-22 PROCEDURE — C9803 HOPD COVID-19 SPEC COLLECT: HCPCS | Performed by: STUDENT IN AN ORGANIZED HEALTH CARE EDUCATION/TRAINING PROGRAM

## 2020-10-22 PROCEDURE — U0003 INFECTIOUS AGENT DETECTION BY NUCLEIC ACID (DNA OR RNA); SEVERE ACUTE RESPIRATORY SYNDROME CORONAVIRUS 2 (SARS-COV-2) (CORONAVIRUS DISEASE [COVID-19]), AMPLIFIED PROBE TECHNIQUE, MAKING USE OF HIGH THROUGHPUT TECHNOLOGIES AS DESCRIBED BY CMS-2020-01-R: HCPCS

## 2020-10-22 PROCEDURE — 93005 ELECTROCARDIOGRAM TRACING: CPT

## 2020-10-22 PROCEDURE — 93005 ELECTROCARDIOGRAM TRACING: CPT | Performed by: EMERGENCY MEDICINE

## 2020-10-22 PROCEDURE — 99220 PR INITIAL OBSERVATION CARE,LEVL III: CPT | Mod: GC | Performed by: INTERNAL MEDICINE

## 2020-10-22 PROCEDURE — 83735 ASSAY OF MAGNESIUM: CPT

## 2020-10-22 PROCEDURE — 82306 VITAMIN D 25 HYDROXY: CPT

## 2020-10-22 PROCEDURE — 700102 HCHG RX REV CODE 250 W/ 637 OVERRIDE(OP): Performed by: STUDENT IN AN ORGANIZED HEALTH CARE EDUCATION/TRAINING PROGRAM

## 2020-10-22 PROCEDURE — G0378 HOSPITAL OBSERVATION PER HR: HCPCS

## 2020-10-22 PROCEDURE — 71045 X-RAY EXAM CHEST 1 VIEW: CPT

## 2020-10-22 PROCEDURE — 84134 ASSAY OF PREALBUMIN: CPT

## 2020-10-22 PROCEDURE — A9270 NON-COVERED ITEM OR SERVICE: HCPCS | Performed by: INTERNAL MEDICINE

## 2020-10-22 PROCEDURE — 700102 HCHG RX REV CODE 250 W/ 637 OVERRIDE(OP): Performed by: INTERNAL MEDICINE

## 2020-10-22 PROCEDURE — 84484 ASSAY OF TROPONIN QUANT: CPT

## 2020-10-22 PROCEDURE — 93005 ELECTROCARDIOGRAM TRACING: CPT | Performed by: STUDENT IN AN ORGANIZED HEALTH CARE EDUCATION/TRAINING PROGRAM

## 2020-10-22 RX ORDER — AMLODIPINE BESYLATE 5 MG/1
10 TABLET ORAL DAILY
Status: DISCONTINUED | OUTPATIENT
Start: 2020-10-23 | End: 2020-10-23 | Stop reason: HOSPADM

## 2020-10-22 RX ORDER — ENALAPRILAT 1.25 MG/ML
1.25 INJECTION INTRAVENOUS EVERY 6 HOURS PRN
Status: DISCONTINUED | OUTPATIENT
Start: 2020-10-22 | End: 2020-10-23 | Stop reason: HOSPADM

## 2020-10-22 RX ORDER — AMLODIPINE BESYLATE 10 MG/1
10 TABLET ORAL DAILY
COMMUNITY

## 2020-10-22 RX ORDER — THIAMINE MONONITRATE (VIT B1) 100 MG
100 TABLET ORAL DAILY
Status: DISCONTINUED | OUTPATIENT
Start: 2020-10-23 | End: 2020-10-23 | Stop reason: HOSPADM

## 2020-10-22 RX ORDER — THIAMINE MONONITRATE (VIT B1) 100 MG
100 TABLET ORAL DAILY
COMMUNITY

## 2020-10-22 RX ORDER — ATORVASTATIN CALCIUM 20 MG/1
80 TABLET, FILM COATED ORAL NIGHTLY
Status: DISCONTINUED | OUTPATIENT
Start: 2020-10-23 | End: 2020-10-23 | Stop reason: HOSPADM

## 2020-10-22 RX ORDER — ONDANSETRON 4 MG/1
4 TABLET, ORALLY DISINTEGRATING ORAL EVERY 4 HOURS PRN
Status: DISCONTINUED | OUTPATIENT
Start: 2020-10-22 | End: 2020-10-23 | Stop reason: HOSPADM

## 2020-10-22 RX ORDER — LOPERAMIDE HYDROCHLORIDE 2 MG/1
2 CAPSULE ORAL 4 TIMES DAILY PRN
COMMUNITY

## 2020-10-22 RX ORDER — CARVEDILOL 3.12 MG/1
3.12 TABLET ORAL 2 TIMES DAILY WITH MEALS
Status: DISCONTINUED | OUTPATIENT
Start: 2020-10-22 | End: 2020-10-23 | Stop reason: HOSPADM

## 2020-10-22 RX ORDER — LACTOBACILLUS RHAMNOSUS GG 10B CELL
1 CAPSULE ORAL
Status: DISCONTINUED | OUTPATIENT
Start: 2020-10-23 | End: 2020-10-23 | Stop reason: HOSPADM

## 2020-10-22 RX ORDER — BENZONATATE 100 MG/1
100 CAPSULE ORAL 3 TIMES DAILY
COMMUNITY

## 2020-10-22 RX ORDER — MAGNESIUM SULFATE HEPTAHYDRATE 40 MG/ML
2 INJECTION, SOLUTION INTRAVENOUS ONCE
Status: DISCONTINUED | OUTPATIENT
Start: 2020-10-22 | End: 2020-10-23 | Stop reason: HOSPADM

## 2020-10-22 RX ORDER — FOLIC ACID 1 MG/1
1 TABLET ORAL DAILY
Status: DISCONTINUED | OUTPATIENT
Start: 2020-10-23 | End: 2020-10-23 | Stop reason: HOSPADM

## 2020-10-22 RX ORDER — BISACODYL 10 MG
10 SUPPOSITORY, RECTAL RECTAL
Status: DISCONTINUED | OUTPATIENT
Start: 2020-10-22 | End: 2020-10-23 | Stop reason: HOSPADM

## 2020-10-22 RX ORDER — AMOXICILLIN 250 MG
2 CAPSULE ORAL 2 TIMES DAILY
Status: DISCONTINUED | OUTPATIENT
Start: 2020-10-22 | End: 2020-10-23 | Stop reason: HOSPADM

## 2020-10-22 RX ORDER — ONDANSETRON 2 MG/ML
4 INJECTION INTRAMUSCULAR; INTRAVENOUS EVERY 4 HOURS PRN
Status: DISCONTINUED | OUTPATIENT
Start: 2020-10-22 | End: 2020-10-23 | Stop reason: HOSPADM

## 2020-10-22 RX ORDER — DEXTROMETHORPHAN HBR. AND GUAIFENESIN 10; 100 MG/5ML; MG/5ML
10 SOLUTION ORAL EVERY 4 HOURS PRN
COMMUNITY

## 2020-10-22 RX ORDER — LOSARTAN POTASSIUM 50 MG/1
50 TABLET ORAL DAILY
Status: DISCONTINUED | OUTPATIENT
Start: 2020-10-23 | End: 2020-10-23 | Stop reason: HOSPADM

## 2020-10-22 RX ORDER — POLYETHYLENE GLYCOL 3350 17 G/17G
1 POWDER, FOR SOLUTION ORAL
Status: DISCONTINUED | OUTPATIENT
Start: 2020-10-22 | End: 2020-10-23 | Stop reason: HOSPADM

## 2020-10-22 RX ORDER — HEPARIN SODIUM 5000 [USP'U]/ML
5000 INJECTION, SOLUTION INTRAVENOUS; SUBCUTANEOUS EVERY 8 HOURS
Status: DISCONTINUED | OUTPATIENT
Start: 2020-10-22 | End: 2020-10-23 | Stop reason: HOSPADM

## 2020-10-22 RX ORDER — ACETAMINOPHEN 325 MG/1
650 TABLET ORAL EVERY 6 HOURS PRN
Status: DISCONTINUED | OUTPATIENT
Start: 2020-10-22 | End: 2020-10-23 | Stop reason: HOSPADM

## 2020-10-22 RX ORDER — FOLIC ACID 1 MG/1
1 TABLET ORAL DAILY
COMMUNITY

## 2020-10-22 RX ORDER — AZITHROMYCIN 250 MG/1
500 TABLET, FILM COATED ORAL DAILY
Status: DISCONTINUED | OUTPATIENT
Start: 2020-10-22 | End: 2020-10-23 | Stop reason: HOSPADM

## 2020-10-22 RX ADMIN — AZITHROMYCIN MONOHYDRATE 500 MG: 250 TABLET ORAL at 18:42

## 2020-10-22 RX ADMIN — HEPARIN SODIUM 5000 UNITS: 5000 INJECTION, SOLUTION INTRAVENOUS; SUBCUTANEOUS at 18:39

## 2020-10-22 RX ADMIN — CARVEDILOL 3.12 MG: 3.12 TABLET, FILM COATED ORAL at 18:40

## 2020-10-22 RX ADMIN — CALCIUM CARBONATE-CHOLECALCIFEROL TAB 250 MG-125 UNIT 1 TABLET: 250-125 TAB at 18:38

## 2020-10-22 RX ADMIN — DOCUSATE SODIUM 50 MG AND SENNOSIDES 8.6 MG 2 TABLET: 8.6; 5 TABLET, FILM COATED ORAL at 18:39

## 2020-10-22 RX ADMIN — HEPARIN SODIUM 5000 UNITS: 5000 INJECTION, SOLUTION INTRAVENOUS; SUBCUTANEOUS at 20:43

## 2020-10-22 RX ADMIN — TICAGRELOR 90 MG: 90 TABLET ORAL at 18:41

## 2020-10-22 ASSESSMENT — ENCOUNTER SYMPTOMS
ORTHOPNEA: 0
BRUISES/BLEEDS EASILY: 1
HEADACHES: 0
BLOOD IN STOOL: 0
PND: 0
ABDOMINAL PAIN: 0
DEPRESSION: 0
PALPITATIONS: 0
FEVER: 0
EYE DISCHARGE: 0
NAUSEA: 0
SORE THROAT: 0
SHORTNESS OF BREATH: 1
DIZZINESS: 1
EYE REDNESS: 0
MYALGIAS: 0
CHILLS: 0
DIARRHEA: 0
VOMITING: 0
COUGH: 1

## 2020-10-22 ASSESSMENT — FIBROSIS 4 INDEX
FIB4 SCORE: 1.19
FIB4 SCORE: 1.26

## 2020-10-22 ASSESSMENT — PAIN DESCRIPTION - PAIN TYPE
TYPE: ACUTE PAIN
TYPE: ACUTE PAIN

## 2020-10-22 NOTE — ED NOTES
Pharmacy Medication Reconciliation      Medication reconciliation updated and complete per pt at bedside & pt home pharmacy  Allergies have been verified  No oral ABX within the last 14 days  Patient home pharmacy:[facility]

## 2020-10-22 NOTE — ED TRIAGE NOTES
Bg Andrea  80 y.o.  Chief Complaint   Patient presents with   • Chest Pain     started last pm; had recent MI and stent placement 2 weeks ago; pt denies SOB

## 2020-10-22 NOTE — H&P
"History & Physical Note    Date of Admission: 10/22/2020  Admission Status: Observation-Outpatient  [facility] Team: [facility] Team  Attending: Edgard Will M.D.   Senior Resident: Dr. Graff  Intern: Dr. Sutton  Contact Number: [phone]    Chief Complaint: Chest Pain    History of Present Illness (HPI):   Bg is a 80 y.o. male with past medical history of dementia, CAD s/p anterior STEMI with with PCI to LAD (9/7/20), stroke (12/2020), hypertension presented presenting to ED with chest pain.      He states that he has had chest pain since yesterday morning.  After he woke up he noticed that he had some pain in the middle of his chest.  He was not doing any exertional activity at the time.  He states that it feels \"like before but not as intense\" and that it is intermittent, crushing, rated 7-9/10, and does not radiate anywhere.  It is not associated with diaphoresis or shortness of breath, and he denies orthopnea and PND.  He does have dyspnea on exertion, feeling short of breath when walking long distances.  He has not taken any additional medications or done anything for his chest pain other than coming here to the ED today.  He states that he has been taking all his medications as prescribed.    He does also state that a few days ago he started to have a cough, which is new for him.  He has also noticed increased shortness of breath lately, though he cannot specify for how long this has been going on.  He had loose stool yesterday as well.  He denies nasal congestion or sore throat.    He denies any recent travel or contact with sick persons.      Review of Systems:   Review of Systems   Constitutional: Negative for chills and fever.   HENT: Positive for hearing loss (at baseline). Negative for congestion and sore throat.    Eyes: Negative for discharge and redness.   Respiratory: Positive for cough and shortness of breath.    Cardiovascular: Positive for chest pain. Negative for palpitations, " orthopnea and PND.   Gastrointestinal: Negative for abdominal pain, blood in stool, diarrhea, nausea and vomiting.   Genitourinary: Negative for dysuria and hematuria.   Musculoskeletal: Negative for joint pain and myalgias.   Skin: Negative for itching and rash.   Neurological: Positive for dizziness. Negative for headaches.   Endo/Heme/Allergies: Negative for environmental allergies. Bruises/bleeds easily.   Psychiatric/Behavioral: Negative for depression and suicidal ideas.       Past Medical History:   Past Medical History was reviewed with patient.   has a past medical history of Cancer ([facility]), COPD (chronic obstructive pulmonary disease) ([facility]), COVID-19 virus infection (10/22/2020), Dementia ([facility]) (12/26/2018), Hypertension, Severe aortic stenosis, Severe aortic stenosis (7/3/2018), ST elevation myocardial infarction involving left anterior descending (LAD) coronary artery ([facility]) (9/7/2020), and Stroke ([facility]). He also has no past medical history of Anginal syndrome ([facility]), Arrhythmia, Arthritis, Asthma, At risk for sleep apnea, Back pain, Blood clotting disorder ([facility]), Bronchitis, Cataract, Congestive heart failure ([facility]), Diabetes ([facility]), Dialysis patient ([facility]), Disorder of thyroid, Fall, Glaucoma, Gynecological disorder, Heart murmur, Hemorrhagic disorder ([facility]), Indigestion, Jaundice, Pacemaker, Pneumonia, Renal disorder, Rheumatic fever, Seizure ([facility]), or Urinary incontinence.    Past Surgical History: Past Surgical History was reviewed with patient.   has a past surgical history that includes other orthopedic surgery; other; and lobectomy (Left).  3 back surgeries    Medications: Medications have been reviewed with patient.  Prior to Admission Medications   Prescriptions Last Dose Informant Patient Reported? Taking?   Cholecalciferol 1000 UNIT Cap 10/22/2020 at 0830 Patient's Home Pharmacy Yes No   Sig: Take 1 Cap by mouth every day.   Cyanocobalamin (VITAMIN B-12) 1000 MCG Tab 10/22/2020 at 0830 Patient's Home  Pharmacy Yes No   Sig: Take 2,000 mcg by mouth every day.   amLODIPine (NORVASC) 10 MG Tab 10/22/2020 at 0830 Patient's Home Pharmacy Yes Yes   Sig: Take 10 mg by mouth every day.   aspirin EC 81 MG EC tablet 10/22/2020 at 0830 Patient's Home Pharmacy No No   Sig: Take 1 Tab by mouth every day.   atorvastatin (LIPITOR) 40 MG Tab 10/21/2020 at pm Patient's Home Pharmacy Yes No   Sig: Take 80 mg by mouth every evening.   benzonatate (TESSALON) 100 MG Cap 10/22/2020 at 0830 Patient's Home Pharmacy Yes Yes   Sig: Take 100 mg by mouth 3 times a day.   carvedilol (COREG) 3.125 MG Tab 10/22/2020 at 0830 Patient's Home Pharmacy No No   Sig: Take 1 Tab by mouth 2 times a day, with meals.   folic acid (FOLVITE) 1 MG Tab 10/22/2020 at 0830 Patient's Home Pharmacy Yes Yes   Sig: Take 1 mg by mouth every day.   guaifenesin dextromethorphan sugar free (DIABETIC TUSSIN DM)  MG/5ML Liquid soln prn at prn Patient's Home Pharmacy Yes Yes   Sig: Take 10 mL by mouth every four hours as needed for Cough.   loperamide (IMODIUM) 2 MG Cap prn at prn Patient's Home Pharmacy Yes Yes   Sig: Take 2 mg by mouth 4 times a day as needed for Diarrhea.   losartan (COZAAR) 50 MG Tab 10/22/2020 at 0830 Patient's Home Pharmacy Yes No   Sig: Take 50 mg by mouth every day.   thiamine (VITAMIN B-1) 100 MG Tab 10/22/2020 at 0830 Patient's Home Pharmacy Yes Yes   Sig: Take 100 mg by mouth every day.   ticagrelor (BRILINTA) 90 MG Tab tablet 10/22/2020 at 0830 Patient's Home Pharmacy No No   Sig: Take 1 Tab by mouth 2 Times a Day.      Facility-Administered Medications: None        Allergies: Allergies have been reviewed with patient.  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     Appearance: He is not toxic-appearing.   HENT:      Head: Normocephalic and atraumatic.      Right Ear: External ear normal.      Left Ear: External ear normal.   Eyes:      General: No scleral icterus.        Right eye: No discharge.         Left eye: No discharge.      Conjunctiva/sclera: Conjunctivae normal.   Neck:      Musculoskeletal: Normal range of motion.   Cardiovascular:      Rate and Rhythm: Normal rate and regular rhythm.      Heart sounds: No murmur. No gallop.    Pulmonary:      Effort: No respiratory distress.      Breath sounds: No wheezing or rales.   Abdominal:      General: There is no distension.      Palpations: Abdomen is soft.      Tenderness: There is no abdominal tenderness.   Musculoskeletal:      Right lower leg: No edema.      Left lower leg: No edema.   Skin:     General: Skin is warm and dry.   Neurological:      General: No focal deficit present.      Mental Status: He is alert and oriented to person, place, and time.   Psychiatric:         Mood and Affect: Mood normal.         Behavior: Behavior normal.         Labs:   See results    EKG: Per my read, NSR with PVC seen in V4-V6.    Imaging:   CXR (10/22): bilateral interstitial infiltrates. No cardiomegaly    Previous Data Review: reviewed    Problem Representation:     Mr. Andrea is a 79 y/o M with PMH of dementia, CAD  s/p anterior STEMI with with PCI to LAD (9/7/20), stroke (12/2020), hypertension presented presenting to ED with acute onset mid sternal chest pain concerning for ACS as well as new-onset cough and SOB accompanied by bilateral interstitial infiltrates seen on CXR (10/22), confirmed to be COVID-19 positive. He is being admitted for ACS rule out.    * Pneumonia due to COVID-19 virus, chest pain w/ recent STEMI s/p stent to LAD  Assessment & Plan  Confirmed positive by PCR test (10/22). New onset cough, SOB. Loose stools on 10/21. CXR with bilateral interstitial infiltrates.    Chest pain  Assessment & Plan  ACS rule out. Initial Trp 15 (1pm) and initial EKG showing PVC V4-V6. ED physician spoke with cardiology who recommends trending troponin for now.  -recheck trp in 4 hours (5pm)  -recheck ECG prn increased chest pain  -continue home BP meds  -continue home ASA, ticagrelor  -if with increasing trp, consult cardiology and start heparin drip    Hypoalbuminemia  Assessment & Plan  Possibly due to malnutrition. Also on ddx are kidney loss, GI loss.  -prealbumin pending    Essential hypertension- (present on admission)  Assessment & Plan  Currently stable BP. Initially presenting /61.   -continue home meds coreg, losartan, amlodipine     Hypocalcemia  Assessment & Plan  Possibly due to Vitamin D deficiency. Corrected calcium 7.5.  -pending Vitamin D level     Holger Sutton M.D. PGY1

## 2020-10-22 NOTE — LETTER
10/28/2020               Bg Andrea  [address] [place] [address]        Dear gB (MR#[id number]),    This letter is to inform you that your COVID-19 test result is POSITIVE.  This means that the virus that causes COVID-19 was found in your sample.      The Health Department will be in contact with you soon.      You are encouraged to continue to quarantine and self-isolate according to the CDC guidance unless otherwise directed by the Health Department.  Per the CDC, you should continue to quarantine until at least 10 days have passed since your symptoms first appeared and at least 24 hours have passed since your fever resolved without the use of fever-reducing medications. Your other symptoms of COVID-19 should also be improving (loss of taste and smell may persist for weeks or months after recovery and should not delay the end of isolation).    Once any symptoms have resolved, it may be possible to donate plasma to help others that are currently ill with COVID-19. To learn more and apply, please contact the  at [phone] or via e-mail at [email or web address].    For any further questions regarding COVID-19, please contact the [facility] at [phone].  Thank you for your cooperation in the matter.      Sincerely,      The [facility] Team

## 2020-10-22 NOTE — ED PROVIDER NOTES
ED Provider Note    Scribed for Edgard Will M.D. by Abbey Hayward. 10/22/2020  1:04 PM    Primary care provider: None  Means of arrival: EMS  History obtained from: patient   History limited by: none    CHIEF COMPLAINT  Chief Complaint   Patient presents with   • Chest Pain     started last pm; had recent MI and stent placement 2 weeks ago; pt denies SOB       HPI  Bg Andrea is a 80 y.o. male, with a history of MI and stent placement, who presents to the Emergency Department for chest pain onset last night. He locates it to his left chest and rates it as a 8-9/10. Denies any radiation of pain. He has associated shortness of breath and dizziness but denies nausea, vomiting, or sweating. Endorses taking all medications as prescribed.    REVIEW OF SYSTEMS  Pertinent positives include chest pain, shortness of breath, and dizziness. Pertinent negatives include no nausea, vomiting, or sweating.  All other systems reviewed and negative.    PAST MEDICAL HISTORY   has a past medical history of Cancer ([facility]), COPD (chronic obstructive pulmonary disease) ([facility]), COVID-19 virus infection (10/22/2020), Dementia ([facility]) (12/26/2018), Hypertension, Severe aortic stenosis, Severe aortic stenosis (7/3/2018), ST elevation myocardial infarction involving left anterior descending (LAD) coronary artery ([facility]) (9/7/2020), and Stroke ([facility]).    SURGICAL HISTORY   has a past surgical history that includes other orthopedic surgery; other; and lobectomy (Left).    SOCIAL HISTORY  Social History     Tobacco Use   • Smoking status: Former Smoker     Packs/day: 1.00     Years: 60.00     Pack years: 60.00     Types: Cigarettes     Start date: 7/2/1950   • Smokeless tobacco: Never Used   Substance Use Topics   • Alcohol use: No     Comment: Quit 4 yrs ago. Rarely drank.    • Drug use: No      Social History     Substance and Sexual Activity   Drug Use No       FAMILY HISTORY  Family History   Problem Relation Age of Onset   • No Known  "Problems Mother    • No Known Problems Father    • Cancer Neg Hx    • Heart Disease Neg Hx        CURRENT MEDICATIONS  Home Medications     Reviewed by Serge Greene (Pharmacy Tech) on 10/22/20 at 1322  Med List Status: Complete   Medication Last Dose Status   amLODIPine (NORVASC) 10 MG Tab 10/22/2020 Active   aspirin EC 81 MG EC tablet 10/22/2020 Active   atorvastatin (LIPITOR) 40 MG Tab 10/21/2020 Active   benzonatate (TESSALON) 100 MG Cap 10/22/2020 Active   carvedilol (COREG) 3.125 MG Tab 10/22/2020 Active   Cholecalciferol 1000 UNIT Cap 10/22/2020 Active   Cyanocobalamin (VITAMIN B-12) 1000 MCG Tab 10/22/2020 Active   folic acid (FOLVITE) 1 MG Tab 10/22/2020 Active   guaifenesin dextromethorphan sugar free (DIABETIC TUSSIN DM)  MG/5ML Liquid soln prn Active   loperamide (IMODIUM) 2 MG Cap prn Active   losartan (COZAAR) 50 MG Tab 10/22/2020 Active   thiamine (VITAMIN B-1) 100 MG Tab 10/22/2020 Active   ticagrelor (BRILINTA) 90 MG Tab tablet 10/22/2020 Active                ALLERGIES  No Known Allergies    PHYSICAL EXAM  VITAL SIGNS: BP (!) 179/61   Pulse 69   Temp 36.6 °C (97.8 °F) (Temporal)   Resp 20   Ht 1.702 m (5' 7\")   Wt 72.6 kg (160 lb)   SpO2 94%   BMI 25.06 kg/m²     Constitutional: Well developed, Well nourished, mild distress, Non-toxic appearance.   HENT: Normocephalic, Atraumatic, Bilateral external ears normal, Oropharynx moist, No oral exudates.   Eyes: PERRLA, EOMI, Conjunctiva normal, No discharge.   Neck: No tenderness, Supple, No stridor.   Lymphatic: No lymphadenopathy noted.   Cardiovascular: Normal heart rate, Normal rhythm.   Thorax & Lungs: Clear to auscultation bilaterally, No respiratory distress, No wheezing, No crackles.   Abdomen: Soft, No tenderness, No masses, No pulsatile masses.   Skin: Warm, Dry, No erythema, chronic skin changes to face and upper chest  Extremities:, No edema No cyanosis, trace lower extremity edema.   Musculoskeletal: No tenderness to " palpation or major deformities noted.  Intact distal pulses  Neurologic: Awake, alert. Moves all extremities spontaneously.  Psychiatric: Affect normal, Judgment normal, Mood normal.     LABS  Results for orders placed or performed during the hospital encounter of 10/22/20   CBC with Differential   Result Value Ref Range    WBC 9.3 4.8 - 10.8 K/uL    RBC 5.13 4.70 - 6.10 M/uL    Hemoglobin 13.2 (L) 14.0 - 18.0 g/dL    Hematocrit 44.0 42.0 - 52.0 %    MCV 85.8 81.4 - 97.8 fL    MCH 25.7 (L) 27.0 - 33.0 pg    MCHC 30.0 (L) 33.7 - 35.3 g/dL    RDW 58.5 (H) 35.9 - 50.0 fL    Platelet Count 370 164 - 446 K/uL    MPV 10.6 9.0 - 12.9 fL    Neutrophils-Polys 76.70 (H) 44.00 - 72.00 %    Lymphocytes 8.30 (L) 22.00 - 41.00 %    Monocytes 10.10 0.00 - 13.40 %    Eosinophils 2.70 0.00 - 6.90 %    Basophils 0.50 0.00 - 1.80 %    Immature Granulocytes 1.70 (H) 0.00 - 0.90 %    Nucleated RBC 0.00 /100 WBC    Neutrophils (Absolute) 7.09 1.82 - 7.42 K/uL    Lymphs (Absolute) 0.77 (L) 1.00 - 4.80 K/uL    Monos (Absolute) 0.93 (H) 0.00 - 0.85 K/uL    Eos (Absolute) 0.25 0.00 - 0.51 K/uL    Baso (Absolute) 0.05 0.00 - 0.12 K/uL    Immature Granulocytes (abs) 0.16 (H) 0.00 - 0.11 K/uL    NRBC (Absolute) 0.00 K/uL    Anisocytosis 1+    Complete Metabolic Panel (CMP)   Result Value Ref Range    Sodium 136 135 - 145 mmol/L    Potassium 3.8 3.6 - 5.5 mmol/L    Chloride 112 96 - 112 mmol/L    Co2 13 (L) 20 - 33 mmol/L    Anion Gap 11.0 7.0 - 16.0    Glucose 85 65 - 99 mg/dL    Bun 14 8 - 22 mg/dL    Creatinine 0.85 0.50 - 1.40 mg/dL    Calcium 6.1 (LL) 8.5 - 10.5 mg/dL    AST(SGOT) 17 12 - 45 U/L    ALT(SGPT) 9 2 - 50 U/L    Alkaline Phosphatase 63 30 - 99 U/L    Total Bilirubin 0.5 0.1 - 1.5 mg/dL    Albumin 2.3 (L) 3.2 - 4.9 g/dL    Total Protein 4.4 (L) 6.0 - 8.2 g/dL    Globulin 2.1 1.9 - 3.5 g/dL    A-G Ratio 1.1 g/dL   Troponin   Result Value Ref Range    Troponin T 15 6 - 19 ng/L   PERIPHERAL SMEAR REVIEW   Result Value Ref Range     Peripheral Smear Review see below    PLATELET ESTIMATE   Result Value Ref Range    Plt Estimation Normal    MORPHOLOGY   Result Value Ref Range    RBC Morphology Present     Poikilocytosis 2+     Ovalocytes 1+     Schistocytes 1+     Echinocytes 2+    DIFFERENTIAL COMMENT   Result Value Ref Range    Comments-Diff see below    ESTIMATED GFR   Result Value Ref Range    GFR If African American >60 >60 mL/min/1.73 m 2    GFR If Non African American >60 >60 mL/min/1.73 m 2   COVID/SARS CoV-2 PCR    Specimen: Nasopharyngeal; Respirate   Result Value Ref Range    COVID Order Status Received    SARS-CoV-2, PCR (In-House)   Result Value Ref Range    SARS-CoV-2 Source NP Swab     SARS-CoV-2 by PCR DETECTED (AA)    Magnesium   Result Value Ref Range    Magnesium 1.3 (L) 1.5 - 2.5 mg/dL   EKG   Result Value Ref Range    Report       [facility] Emergency Dept.    Test Date:  2020-10-22  Pt Name:    LOVE BRAN               Department: ER  MRN:        [id number]                      Room:        10  Gender:     Male                         Technician: [id number]  :        [id number]                   Requested By:ER TRIAGE PROTOCOL  Order #:    [id number]                    Reading MD: LEANN TAY MD    Measurements  Intervals                                Axis  Rate:       64                           P:          55  KY:         148                          QRS:        58  QRSD:       88                           T:          17  QT:         404  QTc:        417    Interpretive Statements  SINUS RHYTHM  VENTRICULAR PREMATURE COMPLEX  Compared to ECG 2020 14:06:23  Ventricular premature complex(es) now present  Sinus bradycardia no longer present  T-wave abnormality no longer present  Electronically Signed On 10- 14:41:02 PDT by LEANN TAY MD     All labs reviewed by me.      EKG  12 Lead EKG; Interpreted by me as shown above.     RADIOLOGY  DX-CHEST-PORTABLE (1 VIEW)   Final  Result      Coarse interstitial opacities bilaterally, left greater than right may represent pneumonitis, including viral pneumonia such as Covid 19.         EC-ECHOCARDIOGRAM COMPLETE W/O CONT    (Results Pending)     The radiologist's interpretation of all radiological studies have been reviewed by me.      COURSE & MEDICAL DECISION MAKING  Pertinent Labs & Imaging studies reviewed. (See chart for details)    I reviewed the patient's medical records which showed he has a history of COPD and a STEMI 6 weeks ago. Additionally he had a LAD stent placed 2 weeks ago.    1:04 PM - Patient seen and examined at bedside.  I discussed that we will obtain labs and imaging to further evaluate for a cause of his symptoms, however because of his recent heart attack and stent placement it is likely he will need to stay in the hospital. Ordered chest x-ray, CBC w/ diff, CMP, troponin, peripheral smear, platelet estimate, morphology, differential comment, and EKG to evaluate his symptoms. The differential diagnoses include but are not limited to: STEMI, intra-abdominal infection, pneumonia, pleural effusions sepsis    1:45 PM - Patient was reevaluated at bedside. Discussed lab and radiology results with the patient and informed them that he will need to be admitted to the hospital. Patient is agreeable.    2:39 PM - I discussed the patient's case and the above findings with Dr. Gloria (Hospitalist) who agreed to evaluate patient for hospitalization. Patients care will be transferred at this time.     2:44 PM I discussed the patient's case and the above findings with Dr. Julian (cardiology) who states to cycle the patient's troponin    Medical decision making  Patient with recent stent is coming in with chest pain, initial troponin is negative, no ST elevations, patient will need to be hospitalized, discussed the case with Dr. Gloria for hospitalization.    DISPOSITION:  [facility]    FINAL IMPRESSION  1. Chest pain, unspecified type          I, Abbey Hayward (Scribe), am scribing for, and in the presence of, Edgard Will M.D..    Electronically signed by: Abbey Hayward (Scribe), 10/22/2020    IEdgard M.D. personally performed the services described in this documentation, as scribed by Abbey Hayward in my presence, and it is both accurate and complete. C    The note accurately reflects work and decisions made by me.  Edgard Will M.D.  10/22/2020  5:44 PM

## 2020-10-22 NOTE — PROGRESS NOTES
[facility]OSPITAL[facility] TRIAGE OFFICER ER REPORT  Consult/Admission requested by: Dr Will  Chief complaint: chest pain  Pertinent history/ER Course: Mr. Andrea had an LAD stent 9/7/20 and presents back with chest pain.   Code Status: to be determined by admitting team   Patient meets admission criterion: Yes..  Recommendations given or work up & consultations requested per triage officer: none  Consultants involved and pertinent input from consultants: Dr. Will has a call out to cardiology  Admission status: To be reviewed by case management and admitting physician.   Admission order placed: To be placed by admitting physician.   Floor requested: tele  Assigned hospitalist: [facility] senior resident Dr. Graff has graciously agreed to admit.

## 2020-10-22 NOTE — ED NOTES
Lab called with critical result of calcium of 6.1 at 1342. Critical lab result read back to lab.   Dr. Will notified of critical lab result at 1350.  Critical lab result read back by Dr. Will.

## 2020-10-22 NOTE — ASSESSMENT & PLAN NOTE
ACS rule out. Initial Trp 15 (1pm) and initial EKG showing PVC V4-V6. ED physician spoke with cardiology who recommends trending troponin for now.  -recheck trp in 4 hours (5pm)  -if with increasing trp, consult cardiology and start heparin drip  -recheck ECG prn increased chest pain  -continue home BP meds  -continue home ASA, ticagrelor

## 2020-10-22 NOTE — SENIOR ADMIT NOTE
Senior Admit Note    Patient: Bg Andrea.  MRN: [id number].                               Chief complaint: Chest pain    Patient is a 80 year old male with a past medical history of  CAD s/p mid-LAD SANTINO stent on 9/7/2020 (currently on DAPT - aspirin + Brilinta), former smoker (quit since 9/2020), COPD, HTN, CVA in 2018 with no residual symptoms, compliant with medications, who presented to the ED with complains of chest pain.    Patient reports that chest pain started on 10/21/20 AM, intermittent, 8-9/10 intensity, centrally located, non-radiating, feels like someone sitting on chest, no modifying factors. Denies any chest pain at this time. Has some shortness of breath on exertion, denies orthopnea or PND. He has non-productive cough for 2 days. Denies fever or chills. No close contact with COVID-19 tested positive patients.    In the ED, he is afebrile and hemodynamically stable (initial BP is 179/61, but later readings are 102-138/51-67), saturating well on Room Air. On physical exam, he is alert, oriented x 3, CVS - regular rate and rhythm, lungs sounded clear, no evidence of volume overload.     Labs/Imaging:  CBC: Hb 13.2  CMP: Corrected Ca 7.5, Albumin 2.3  COVID -19 tested positive  Troponin T 15, trending Q4H x2  EKG - SR, HR 64, PVCs    Assessment and Plan:  # COVID-19 positive - symptomatic, on Room Air  # Chest pain for ACS rule out - possible unstable angina with normal troponin and no acute ischemic changes on EKG  # CAD s/p LAD stent SANTINO  # Normal gap metabolic acidosis  # Hypocalcemia - pending Vit D  # Hypoalbuminemia - pending pre-albumin  # H/O Ascending aortic aneurysm, 4.4 cm  # Hypertension    Plan:  Admit to telemetry  Continuous Cardiac Monitoring, pulse oximetry  Trend troponin x2, consider cardiology consult if troponin trend upwards  EKG PRN for chest pain  Transferred to hospitalist service for COVID-19 management and  isolation  Continue dual antiplatelet therapy(aspirin + brilinta), atorvastatin 80 mg/day, losartan 50 mg/day, coreg 3.125 mg BID    For complete details, please refer to H&P by Dr. Gregg.    Paula Graff M.D.

## 2020-10-23 VITALS
RESPIRATION RATE: 18 BRPM | WEIGHT: 143.74 LBS | BODY MASS INDEX: 22.56 KG/M2 | OXYGEN SATURATION: 92 % | TEMPERATURE: 97 F | HEART RATE: 78 BPM | DIASTOLIC BLOOD PRESSURE: 63 MMHG | HEIGHT: 67 IN | SYSTOLIC BLOOD PRESSURE: 116 MMHG

## 2020-10-23 LAB
ALBUMIN SERPL BCP-MCNC: 2.9 G/DL (ref 3.2–4.9)
ALBUMIN/GLOB SERPL: 0.9 G/DL
ALP SERPL-CCNC: 75 U/L (ref 30–99)
ALT SERPL-CCNC: 10 U/L (ref 2–50)
ANION GAP SERPL CALC-SCNC: 13 MMOL/L (ref 7–16)
AST SERPL-CCNC: 14 U/L (ref 12–45)
BASOPHILS # BLD AUTO: 0.7 % (ref 0–1.8)
BASOPHILS # BLD: 0.08 K/UL (ref 0–0.12)
BILIRUB SERPL-MCNC: 0.7 MG/DL (ref 0.1–1.5)
BUN SERPL-MCNC: 16 MG/DL (ref 8–22)
CALCIUM SERPL-MCNC: 8.7 MG/DL (ref 8.5–10.5)
CHLORIDE SERPL-SCNC: 103 MMOL/L (ref 96–112)
CO2 SERPL-SCNC: 16 MMOL/L (ref 20–33)
CREAT SERPL-MCNC: 1.03 MG/DL (ref 0.5–1.4)
D DIMER PPP IA.FEU-MCNC: 1.92 UG/ML (FEU) (ref 0–0.5)
EKG IMPRESSION: NORMAL
EOSINOPHIL # BLD AUTO: 0.3 K/UL (ref 0–0.51)
EOSINOPHIL NFR BLD: 2.7 % (ref 0–6.9)
ERYTHROCYTE [DISTWIDTH] IN BLOOD BY AUTOMATED COUNT: 58.8 FL (ref 35.9–50)
GLOBULIN SER CALC-MCNC: 3.2 G/DL (ref 1.9–3.5)
GLUCOSE SERPL-MCNC: 86 MG/DL (ref 65–99)
HCT VFR BLD AUTO: 47 % (ref 42–52)
HGB BLD-MCNC: 14 G/DL (ref 14–18)
IMM GRANULOCYTES # BLD AUTO: 0.26 K/UL (ref 0–0.11)
IMM GRANULOCYTES NFR BLD AUTO: 2.3 % (ref 0–0.9)
LYMPHOCYTES # BLD AUTO: 1.02 K/UL (ref 1–4.8)
LYMPHOCYTES NFR BLD: 9.1 % (ref 22–41)
MAGNESIUM SERPL-MCNC: 1.9 MG/DL (ref 1.5–2.5)
MCH RBC QN AUTO: 25.4 PG (ref 27–33)
MCHC RBC AUTO-ENTMCNC: 29.8 G/DL (ref 33.7–35.3)
MCV RBC AUTO: 85.3 FL (ref 81.4–97.8)
MONOCYTES # BLD AUTO: 1.31 K/UL (ref 0–0.85)
MONOCYTES NFR BLD AUTO: 11.7 % (ref 0–13.4)
NEUTROPHILS # BLD AUTO: 8.26 K/UL (ref 1.82–7.42)
NEUTROPHILS NFR BLD: 73.5 % (ref 44–72)
NRBC # BLD AUTO: 0 K/UL
NRBC BLD-RTO: 0 /100 WBC
PLATELET # BLD AUTO: 534 K/UL (ref 164–446)
PMV BLD AUTO: 10.6 FL (ref 9–12.9)
POTASSIUM SERPL-SCNC: 3.7 MMOL/L (ref 3.6–5.5)
PROT SERPL-MCNC: 6.1 G/DL (ref 6–8.2)
RBC # BLD AUTO: 5.51 M/UL (ref 4.7–6.1)
SODIUM SERPL-SCNC: 132 MMOL/L (ref 135–145)
WBC # BLD AUTO: 11.2 K/UL (ref 4.8–10.8)

## 2020-10-23 PROCEDURE — 85379 FIBRIN DEGRADATION QUANT: CPT

## 2020-10-23 PROCEDURE — 83735 ASSAY OF MAGNESIUM: CPT

## 2020-10-23 PROCEDURE — A9270 NON-COVERED ITEM OR SERVICE: HCPCS | Performed by: STUDENT IN AN ORGANIZED HEALTH CARE EDUCATION/TRAINING PROGRAM

## 2020-10-23 PROCEDURE — 96372 THER/PROPH/DIAG INJ SC/IM: CPT

## 2020-10-23 PROCEDURE — 80053 COMPREHEN METABOLIC PANEL: CPT

## 2020-10-23 PROCEDURE — 700111 HCHG RX REV CODE 636 W/ 250 OVERRIDE (IP): Performed by: STUDENT IN AN ORGANIZED HEALTH CARE EDUCATION/TRAINING PROGRAM

## 2020-10-23 PROCEDURE — 90662 IIV NO PRSV INCREASED AG IM: CPT | Performed by: STUDENT IN AN ORGANIZED HEALTH CARE EDUCATION/TRAINING PROGRAM

## 2020-10-23 PROCEDURE — 93010 ELECTROCARDIOGRAM REPORT: CPT | Performed by: INTERNAL MEDICINE

## 2020-10-23 PROCEDURE — 700102 HCHG RX REV CODE 250 W/ 637 OVERRIDE(OP): Performed by: INTERNAL MEDICINE

## 2020-10-23 PROCEDURE — 90471 IMMUNIZATION ADMIN: CPT

## 2020-10-23 PROCEDURE — 99217 PR OBSERVATION CARE DISCHARGE: CPT | Performed by: STUDENT IN AN ORGANIZED HEALTH CARE EDUCATION/TRAINING PROGRAM

## 2020-10-23 PROCEDURE — 700102 HCHG RX REV CODE 250 W/ 637 OVERRIDE(OP): Performed by: STUDENT IN AN ORGANIZED HEALTH CARE EDUCATION/TRAINING PROGRAM

## 2020-10-23 PROCEDURE — 36415 COLL VENOUS BLD VENIPUNCTURE: CPT

## 2020-10-23 PROCEDURE — A9270 NON-COVERED ITEM OR SERVICE: HCPCS | Performed by: INTERNAL MEDICINE

## 2020-10-23 PROCEDURE — 85025 COMPLETE CBC W/AUTO DIFF WBC: CPT

## 2020-10-23 PROCEDURE — G0378 HOSPITAL OBSERVATION PER HR: HCPCS

## 2020-10-23 RX ORDER — ZINC SULFATE 50(220)MG
220 CAPSULE ORAL DAILY
Qty: 30 CAP | Refills: 3 | Status: SHIPPED | OUTPATIENT
Start: 2020-10-23

## 2020-10-23 RX ORDER — AZITHROMYCIN 250 MG/1
TABLET, FILM COATED ORAL
Qty: 1 TAB | Refills: 0 | Status: SHIPPED | OUTPATIENT
Start: 2020-10-23 | End: 2020-10-24

## 2020-10-23 RX ORDER — DEXAMETHASONE 6 MG/1
6 TABLET ORAL DAILY
Qty: 10 TAB | Refills: 0 | Status: SHIPPED | OUTPATIENT
Start: 2020-10-28 | End: 2020-11-07

## 2020-10-23 RX ORDER — ACETAMINOPHEN 325 MG/1
650 TABLET ORAL EVERY 6 HOURS PRN
Qty: 30 TAB | Refills: 0 | Status: SHIPPED | OUTPATIENT
Start: 2020-10-23

## 2020-10-23 RX ORDER — AZITHROMYCIN 250 MG/1
TABLET, FILM COATED ORAL
Qty: 1 TAB | Refills: 0 | Status: SHIPPED | OUTPATIENT
Start: 2020-10-23 | End: 2020-10-23

## 2020-10-23 RX ORDER — SODIUM CHLORIDE 1 G/1
1 TABLET ORAL
Status: DISCONTINUED | OUTPATIENT
Start: 2020-10-23 | End: 2020-10-23 | Stop reason: HOSPADM

## 2020-10-23 RX ORDER — ASCORBIC ACID 500 MG
500 TABLET ORAL DAILY
Qty: 15 TAB | Refills: 0 | Status: SHIPPED | OUTPATIENT
Start: 2020-10-23

## 2020-10-23 RX ADMIN — FOLIC ACID 1 MG: 1 TABLET ORAL at 05:16

## 2020-10-23 RX ADMIN — AZITHROMYCIN MONOHYDRATE 500 MG: 250 TABLET ORAL at 05:15

## 2020-10-23 RX ADMIN — AMLODIPINE BESYLATE 10 MG: 5 TABLET ORAL at 05:16

## 2020-10-23 RX ADMIN — Medication 100 MG: at 05:16

## 2020-10-23 RX ADMIN — CALCIUM CARBONATE-CHOLECALCIFEROL TAB 250 MG-125 UNIT 1 TABLET: 250-125 TAB at 05:16

## 2020-10-23 RX ADMIN — HEPARIN SODIUM 5000 UNITS: 5000 INJECTION, SOLUTION INTRAVENOUS; SUBCUTANEOUS at 05:18

## 2020-10-23 RX ADMIN — HEPARIN SODIUM 5000 UNITS: 5000 INJECTION, SOLUTION INTRAVENOUS; SUBCUTANEOUS at 13:43

## 2020-10-23 RX ADMIN — TICAGRELOR 90 MG: 90 TABLET ORAL at 05:15

## 2020-10-23 RX ADMIN — CARVEDILOL 3.12 MG: 3.12 TABLET, FILM COATED ORAL at 08:37

## 2020-10-23 RX ADMIN — Medication 1 CAPSULE: at 08:37

## 2020-10-23 RX ADMIN — INFLUENZA A VIRUS A/MICHIGAN/45/2015 X-275 (H1N1) ANTIGEN (FORMALDEHYDE INACTIVATED), INFLUENZA A VIRUS A/SINGAPORE/INFIMH-16-0019/2016 IVR-186 (H3N2) ANTIGEN (FORMALDEHYDE INACTIVATED), INFLUENZA B VIRUS B/PHUKET/3073/2013 ANTIGEN (FORMALDEHYDE INACTIVATED), AND INFLUENZA B VIRUS B/MARYLAND/15/2016 BX-69A ANTIGEN (FORMALDEHYDE INACTIVATED) 0.7 ML: 60; 60; 60; 60 INJECTION, SUSPENSION INTRAMUSCULAR at 15:53

## 2020-10-23 RX ADMIN — ASPIRIN 81 MG: 81 TABLET, COATED ORAL at 05:16

## 2020-10-23 ASSESSMENT — LIFESTYLE VARIABLES
ALCOHOL_USE: NO
TOTAL SCORE: 0
HAVE YOU EVER FELT YOU SHOULD CUT DOWN ON YOUR DRINKING: NO
ON A TYPICAL DAY WHEN YOU DRINK ALCOHOL HOW MANY DRINKS DO YOU HAVE: 2
CONSUMPTION TOTAL: NEGATIVE
HOW MANY TIMES IN THE PAST YEAR HAVE YOU HAD 5 OR MORE DRINKS IN A DAY: 0
TOTAL SCORE: 0
TOTAL SCORE: 0
DOES PATIENT WANT TO STOP DRINKING: NO
HAVE PEOPLE ANNOYED YOU BY CRITICIZING YOUR DRINKING: NO
EVER HAD A DRINK FIRST THING IN THE MORNING TO STEADY YOUR NERVES TO GET RID OF A HANGOVER: NO
AVERAGE NUMBER OF DAYS PER WEEK YOU HAVE A DRINK CONTAINING ALCOHOL: 2
EVER FELT BAD OR GUILTY ABOUT YOUR DRINKING: NO

## 2020-10-23 ASSESSMENT — PATIENT HEALTH QUESTIONNAIRE - PHQ9
SUM OF ALL RESPONSES TO PHQ9 QUESTIONS 1 AND 2: 0
1. LITTLE INTEREST OR PLEASURE IN DOING THINGS: NOT AT ALL
2. FEELING DOWN, DEPRESSED, IRRITABLE, OR HOPELESS: NOT AT ALL

## 2020-10-23 ASSESSMENT — COGNITIVE AND FUNCTIONAL STATUS - GENERAL
STANDING UP FROM CHAIR USING ARMS: A LITTLE
SUGGESTED CMS G CODE MODIFIER DAILY ACTIVITY: CH
WALKING IN HOSPITAL ROOM: A LITTLE
DAILY ACTIVITIY SCORE: 24
CLIMB 3 TO 5 STEPS WITH RAILING: A LITTLE
SUGGESTED CMS G CODE MODIFIER MOBILITY: CJ
MOBILITY SCORE: 20
MOVING FROM LYING ON BACK TO SITTING ON SIDE OF FLAT BED: A LITTLE

## 2020-10-23 ASSESSMENT — FIBROSIS 4 INDEX: FIB4 SCORE: 0.68

## 2020-10-23 NOTE — DISCHARGE PLANNING
Anticipated Discharge Disposition: home    Action: informed by [facility] that pt has no ride home. Pt [place]. Spoke w/ wife Mandi [phone]. Wife states they don't have a car and they don't drive and they have no one for rides. Wife states they ride handivan to get around for groceries and MD appointments. Wife states they have money to afford ride home. Confirmed address w/ wife. Provided taxi voucher to [employer]    Barriers to Discharge: none    Plan: taxi voucher home

## 2020-10-23 NOTE — PROGRESS NOTES
Patient arrived on unit at approximately 1745 from ED. Patient a&ox4, able to ambulate to bed from hallway. Gait steady. Tele in place. Patient reports chest pain of 3/10, improved since this morning.

## 2020-10-23 NOTE — RESPIRATORY CARE
" COPD EDUCATION by COPD CLINICAL EDUCATOR  10/23/2020 at 3:58 PM by Katlyn Aleman, RRT     Smoking Cessation Intervention and education completed, 2 minutes spent on smoking cessation education with patient. He declined our packet and states\"I quit\"      "

## 2020-10-23 NOTE — PROGRESS NOTES
With patient’s permission (if able), attempted to contact designated support person, arnie Mandi.     All questions answered at this time.

## 2020-10-23 NOTE — DISCHARGE SUMMARY
Discharge Summary    CHIEF COMPLAINT ON ADMISSION  Chief Complaint   Patient presents with   • Chest Pain     started last pm; had recent MI and stent placement 2 weeks ago; pt denies SOB       Reason for Admission  ems     Admission Date  10/22/2020    CODE STATUS  Full Code    HPI & HOSPITAL COURSE  Presented with Chest Pain (started last pm; had recent MI and stent placement 2 weeks ago; pt denies SOB)  Started yesterday morning, occurred at rest, moderate to severe in intensity, pressure in character and nonradiating. He has chronic dyspnea on exertion. He has a mild cough and slightly worsening dyspnea per [facility] resident however he denied this when I spoke with him and wondered where he got CoVID. He denied fever, myalgias and exposure to CoVID positive patients or endemic hotspots. He was hospitalized in September 2020 for ACS, however it did not appear he was tested at that time.  Patient was negative for significant Troponins. Chest pain decreased and he responded the chest pain was positional and worsened by inhalation. In light of labs and Covid diagnosis, pleuritis is more likely diagnosis. He would likely benefit from NSAIDs. Patient ambulatory on room air with godo oxygen saturation with no complaints today. Patients' echo on file from 1 month ago. Patient has elevated wbc count possibly due to pneumonia. Patient was started on Azithromycin to complete as outpatient. Patient noted to already be on anticoag. He has echo scheduled next in January, he received one a month ago. Patient for self-isolation at home.       Therefore, he is discharged in good and stable condition to home with close outpatient follow-up.    The patient recovered much more quickly than anticipated on admission.    Discharge Date  10/23/2020    FOLLOW UP ITEMS POST DISCHARGE  Get echo as outpatient    DISCHARGE DIAGNOSES  Principal Problem:    Pneumonia due to COVID-19 virus, chest pain w/ recent STEMI s/p stent to LAD POA:  Yes  Active Problems:    Chest pain POA: Yes    Essential hypertension POA: Yes    Hypoalbuminemia POA: Yes    Hypocalcemia POA: Yes  Resolved Problems:    * No resolved hospital problems. *      FOLLOW UP  Future Appointments   Date Time Provider Department Center   1/4/2021 10:15 AM [facility] EXAM 9 ECHO [facility]     No follow-up provider specified.    MEDICATIONS ON DISCHARGE     Medication List      START taking these medications      Instructions   acetaminophen 325 MG Tabs  Commonly known as: TYLENOL   Take 2 Tabs by mouth every 6 hours as needed (Mild Pain; (Pain scale 1-3); Temp greater than 100.5 F).  Dose: 650 mg     ascorbic acid 500 MG tablet  Commonly known as: VITAMIN C   Take 1 Tab by mouth every day.  Dose: 500 mg     azithromycin 250 MG Tabs  Commonly known as: ZITHROMAX   Doctor's comments: .  Take one tablet daily.     dexamethasone 6 MG Tabs  Start taking on: October 28, 2020  Commonly known as: Decadron   Take 1 Tab by mouth every day for 10 days. Do not start decadron until 10/28  Dose: 6 mg     zinc sulfate 220 (50 Zn) MG Caps  Commonly known as: ZINCATE   Take 1 Cap by mouth every day.  Dose: 220 mg        CONTINUE taking these medications      Instructions   amLODIPine 10 MG Tabs  Commonly known as: NORVASC   Take 10 mg by mouth every day.  Dose: 10 mg     aspirin 81 MG EC tablet   Take 1 Tab by mouth every day.  Dose: 81 mg     benzonatate 100 MG Caps  Commonly known as: TESSALON   Take 100 mg by mouth 3 times a day.  Dose: 100 mg     carvedilol 3.125 MG Tabs  Commonly known as: COREG   Take 1 Tab by mouth 2 times a day, with meals.  Dose: 3.125 mg     Cholecalciferol 1000 UNIT Caps   Take 1 Cap by mouth every day.  Dose: 1 Cap     folic acid 1 MG Tabs  Commonly known as: FOLVITE   Take 1 mg by mouth every day.  Dose: 1 mg     guaifenesin dextromethorphan sugar free  MG/5ML Liqd soln  Commonly known as: DIABETIC TUSSIN DM   Take 10 mL by mouth every four hours as needed for  Cough.  Dose: 10 mL     loperamide 2 MG Caps  Commonly known as: IMODIUM   Take 2 mg by mouth 4 times a day as needed for Diarrhea.  Dose: 2 mg     losartan 50 MG Tabs  Commonly known as: COZAAR   Take 50 mg by mouth every day.  Dose: 50 mg     thiamine 100 MG Tabs  Commonly known as: vitamin B-1   Take 100 mg by mouth every day.  Dose: 100 mg     ticagrelor 90 MG Tabs tablet  Commonly known as: BRILINTA   Take 1 Tab by mouth 2 Times a Day.  Dose: 90 mg     vitamin B-12 1000 MCG Tabs   Take 2,000 mcg by mouth every day.  Dose: 2,000 mcg        STOP taking these medications    atorvastatin 40 MG Tabs  Commonly known as: LIPITOR            Allergies  No Known Allergies    DIET  Orders Placed This Encounter   Procedures   • Diet NPO     Standing Status:   Standing     Number of Occurrences:   1     Order Specific Question:   Restrict to:     Answer:   Sips with Medications [3]       ACTIVITY  As tolerated.  Weight bearing as tolerated    CONSULTATIONS  Cardiology    PROCEDURES  None    LABORATORY  Lab Results   Component Value Date    SODIUM 132 (L) 10/23/2020    POTASSIUM 3.7 10/23/2020    CHLORIDE 103 10/23/2020    CO2 16 (L) 10/23/2020    GLUCOSE 86 10/23/2020    BUN 16 10/23/2020    CREATININE 1.03 10/23/2020        Lab Results   Component Value Date    WBC 11.2 (H) 10/23/2020    HEMOGLOBIN 14.0 10/23/2020    HEMATOCRIT 47.0 10/23/2020    PLATELETCT 534 (H) 10/23/2020        Total time of the discharge process exceeds 30 minutes.

## 2020-10-23 NOTE — PROGRESS NOTES
When coming on to shift patient Attending ws WALLY chaves.   Called [facility] gray they said they are not following this patient.   WALLY chaves said MD Bland on board.   Paged MD Suárez for questions, Baldo says not his patient.    Messaged STORM Mendoza- she states will assign patient to an MD.     Still waiting to know what MD following this patient.

## 2020-10-23 NOTE — PROGRESS NOTES
2 RN Skin Check    2 RN skin check complete.   Devices in place: NA.  Skin assessed under devices: N\A.  Confirmed pressure ulcers found on: NA.  New potential pressure ulcers noted on NA. Wound consult placed No.  The following interventions in place Pillows.    Patient arrived on unit from ED. Skin intact. No open areas noted.

## 2020-10-23 NOTE — PROGRESS NOTES
Pt slept throughout the night.  After the call with pt's spouse she informed the RN that pt can be confused during the night.  Bed alarm in place, no confusion noticed throughout the night.  O2 saturation 90-93% on room air.

## 2020-10-23 NOTE — PROGRESS NOTES
With patient’s permission, completed daily phone call to designated support person, name Mandi, wife, [phone].  Discussed patient condition and plan of care. All questions answered.  Family made aware of the two times a day update calls.

## 2020-10-23 NOTE — ASSESSMENT & PLAN NOTE
Confirmed positive by PCR test (10/22). New onset cough, SOB. Loose stools on 10/21. CXR with bilateral interstitial infiltrates.

## 2020-10-23 NOTE — CARE PLAN
Problem: Safety  Goal: Will remain free from falls  Outcome: PROGRESSING AS EXPECTED  Note: Bed alarm on, call light in reach, patient understands to call for assistance.      Problem: Pain Management  Goal: Pain level will decrease to patient's comfort goal  Outcome: PROGRESSING AS EXPECTED  Note: Patient understands pain scale and goal, has PRN medications available if needed. States 0/10 pain. Will continue to monitor.

## 2020-10-23 NOTE — PROGRESS NOTES
Monitor Summary:    SR 61-76 with occasional PVCs     0.18/0.10/0.38    Per strip from the monitor room

## 2020-10-23 NOTE — CARE PLAN
Problem: Communication  Goal: The ability to communicate needs accurately and effectively will improve  Outcome: PROGRESSING AS EXPECTED  Intervention: [place] patient and significant other/support system to call light to alert staff of needs  Flowsheets (Taken 10/22/2020 0810)  Oriented to::   Location of bathroom   Call Light & Bedside Controls  Note: Pt hard of hearing, pt's spouse stated that pt becomes confused at night, will continue to monitor     Problem: Safety  Goal: Will remain free from injury  Outcome: PROGRESSING AS EXPECTED  Intervention: Provide assistance with mobility  Flowsheets (Taken 10/22/2020 2013 by Chrissy Miller, C.N.A.)  Assistance: Standby Assist  Ambulation Tolerance: Tolerates Well  Note: Pt calls appropriately, wife states pt becomes confused at night, strip alarm inplace

## 2020-10-23 NOTE — DISCHARGE INSTRUCTIONS
>return to hospital if symptoms worsen with shortness of breath  >Avoid Decadron unless not improving in the next 4-6 days.    MD states to take an NSAID if you experience any pain. Examples: Iuprofen or Aleve    Discharge Instructions    Discharged to home by car with relative. Discharged via wheelchair, hospital escort: Yes.  Special equipment needed: Not Applicable    Be sure to schedule a follow-up appointment with your primary care doctor or any specialists as instructed.     Discharge Plan:   Influenza Vaccine Indication: Indicated: 65 years and older    I understand that a diet low in cholesterol, fat, and sodium is recommended for good health. Unless I have been given specific instructions below for another diet, I accept this instruction as my diet prescription.   Other diet: cardiac    Special Instructions: None    · Is patient discharged on Warfarin / Coumadin?   No     Depression / Suicide Risk    As you are discharged from this [facility], it is important to learn how to keep safe from harming yourself.    Recognize the warning signs:  · Abrupt changes in personality, positive or negative- including increase in energy   · Giving away possessions  · Change in eating patterns- significant weight changes-  positive or negative  · Change in sleeping patterns- unable to sleep or sleeping all the time   · Unwillingness or inability to communicate  · Depression  · Unusual sadness, discouragement and loneliness  · Talk of wanting to die  · Neglect of personal appearance   · Rebelliousness- reckless behavior  · Withdrawal from people/activities they love  · Confusion- inability to concentrate     If you or a loved one observes any of these behaviors or has concerns about self-harm, here's what you can do:  · Talk about it- your feelings and reasons for harming yourself  · Remove any means that you might use to hurt yourself (examples: pills, rope, extension cords, firearm)  · Get professional help  from the community (Mental Health, Substance Abuse, psychological counseling)  · Do not be alone:Call your Safe Contact- someone whom you trust who will be there for you.  · Call your local CRISIS HOTLINE 138-3006 or 561-696-2561  · Call your local Children's Mobile Crisis Response Team [place] (210) 611-5340 or www.[employer]  · Call the toll free National Suicide Prevention Hotlines   · National Suicide Prevention Lifeline 529-891-DFSP (6275)  · National Hope Line Network 800-SUICIDE (532-3026)    INSTRUCTIONS FOR COVID-19 OR ANY OTHER INFECTIOUS RESPIRATORY ILLNESSES    The Centers for Disease Control and Prevention (CDC) states that early indications for COVID-19 include cough, shortness of breath, difficulty breathing, or at least two of the following symptoms: chills, shaking with chills, muscle pain, headache, sore throat, and loss of taste or smell. Symptoms can range from mild to severe and may appear up to two weeks after exposure to the virus.    The practice of self-isolation and quarantine helps protect the public and your family by  preventing exposure to people who have or may have a contagious disease. Please follow the prevention steps below as based on CDC guidelines:    WHEN TO STOP ISOLATION: Persons with COVID-19 or any other infectious respiratory illness who have symptoms and were advised to care for themselves at home may discontinue home isolation under the following conditions:  · At least 24 hours have passed since recovery defined as resolution of fever without the use of fever-reducing medications; AND,  · Improvement in respiratory symptoms (e.g., cough, shortness of breath); AND,  · At least 10 days have passed since symptoms first appeared and have had no subsequent illness.    MONITOR YOUR SYMPTOMS: If your illness is worsening, seek prompt medical attention. If you have a medical emergency and need to call 911, notify the dispatch personnel that you have, or are being  evaluated for confirmed or suspected COVID-19 or another infectious respiratory illness. Wear a facemask if possible.    ACTIVITY RESTRICTION: restrict activities outside your home, except for getting medical care. Do not go to work, school, or public areas. Avoid using public transportation, ride-sharing, or taxis.    SCHEDULED MEDICAL APPOINTMENTS: Notify your provider that you have, or are being evaluated for, confirmed or suspected COVID-19 or another infectious respiratory. This will help the healthcare provider’s office safely take care of you and keep other people from getting exposed or infected.    FACEMASKS, when to wear: Anytime you are away from your home or around other people or pets. If you are unable to wear one, maintain a minimum of 6 feet distancing from others.    LIVING ENVIRONMENT: Stay in a separate room from other people and pets. If possible, use a separate bathroom, have someone else care for your pets and avoid sharing household items. Any items used should be washed thoroughly with soap and water. Clean all “high-touch” surfaces every day. Use a household cleaning spray or wipe, according to the label instructions. High touch surfaces include (but are not limited to) counters, tabletops, doorknobs, bathroom fixtures, toilets, phones, keyboards, tablets, and bedside tables.     HAND WASHING: Frequently wash hands with soap and water for at least 20 seconds,  especially after blowing your nose, coughing, or sneezing; going to the bathroom; before and after interacting with pets; and before and after eating or preparing food. If hands are visibly dirty use soap and water. If soap and water are not available, use an alcohol-based hand  with at least 60% alcohol. Avoid touching your eyes, nose, and mouth with unwashed hands. Cover your coughs and sneezes with a tissue. Throw used tissues in a lined trash can. Immediately wash your hands.    ACTIVE/FACILITATED SELF-MONITORING: Follow  instructions provided by your local health department or health professionals, as appropriate. When working with your local health department check their available hours.    [facility]   Phone Number   [facility] [phone]   [place], [place] [phone]   [facility] Call 211   [place] [phone]     IF YOU HAVE CONFIRMED POSITIVE COVID-19:    Those who have completely recovered from COVID-19 may have immune-boosting antibodies in their plasma--called “convalescent plasma”--that could be used to treat critically ill COVID19 patients.    [facility] is excited to begin working with [facility] on collecting convalescent plasma from  people who have recovered from COVID-19 as part of a program to treat patients infected with the virus. This FDA-approved “emergency investigational new drug” is a special blood product containing antibodies that may give patients an extra boost to fight the virus.    To be eligible to donate convalescent plasma, you must have a prior COVID-19 diagnosis documented by a laboratory test (or a positive test result for SARS-CoV-2 antibodies) and meet additional eligibility requirements.    If you are interested in donating convalescent plasma or have any additional questions, please contact the [facility] Convalescent Plasma  at [phone] or via e-mail at [email or web address].      Heart Failure Eating Plan  Heart failure, also called congestive heart failure, occurs when your heart does not pump blood well enough to meet your body's needs for oxygen-rich blood. Heart failure is a long-term (chronic) condition. Living with heart failure can be challenging. However, following your health care provider's instructions about a healthy lifestyle and working with a diet and nutrition specialist (dietitian) to choose the right foods may help to improve your symptoms.  What are tips for following this plan?  Reading food labels  · Check food labels for the  "amount of sodium per serving. Choose foods that have less than 140 mg (milligrams) of sodium in each serving.  · Check food labels for the number of calories per serving. This is important if you need to limit your daily calorie intake to lose weight.  · Check food labels for the serving size. If you eat more than one serving, you will be eating more sodium and calories than what is listed on the label.  · Look for foods that are labeled as \"sodium-free,\" \"very low sodium,\" or \"low sodium.\"  ? Foods labeled as \"reduced sodium\" or \"lightly salted\" may still have more sodium than what is recommended for you.  Cooking  · Avoid adding salt when cooking. Ask your health care provider or dietitian before using salt substitutes.  · Season food with salt-free seasonings, spices, or herbs. Check the label of seasoning mixes to make sure they do not contain salt.  · Cook with heart-healthy oils, such as olive, canola, soybean, or sunflower oil.  · Do not santoro foods. Cook foods using low-fat methods, such as baking, boiling, grilling, and broiling.  · Limit unhealthy fats when cooking by:  ? Removing the skin from poultry, such as chicken.  ? Removing all visible fats from meats.  ? Skimming the fat off from stews, soups, and gravies before serving them.  Meal planning    · Limit your intake of:  ? Processed, canned, or pre-packaged foods.  ? Foods that are high in trans fat, such as fried foods.  ? Sweets, desserts, sugary drinks, and other foods with added sugar.  ? Full-fat dairy products, such as whole milk.  · Eat a balanced diet that includes:  ? 4-5 servings of fruit each day and 4-5 servings of vegetables each day. At each meal, try to fill half of your plate with fruits and vegetables.  ? Up to 6-8 servings of whole grains each day.  ? Up to 2 servings of lean meat, poultry, or fish each day. One serving of meat is equal to 3 oz. This is about the same size as a deck of cards.  ? 2 servings of low-fat dairy each " day.  ? Heart-healthy fats. Healthy fats called omega-3 fatty acids are found in foods such as flaxseed and cold-water fish like sardines, salmon, and mackerel.  · Aim to eat 25-35 g (grams) of fiber a day. Foods that are high in fiber include apples, broccoli, carrots, beans, peas, and whole grains.  · Do not add salt or condiments that contain salt (such as soy sauce) to foods before eating.  · When eating at a restaurant, ask that your food be prepared with less salt or no salt, if possible.  · Try to eat 2 or more vegetarian meals each week.  · Eat more home-cooked food and eat less restaurant, buffet, and fast food.  General information  · Do not eat more than 2,300 mg of salt (sodium) a day. The amount of sodium that is recommended for you may be lower, depending on your condition.  · Maintain a healthy body weight as directed. Ask your health care provider what a healthy weight is for you.  ? Check your weight every day.  ? Work with your health care provider and dietitian to make a plan that is right for you to lose weight or maintain your current weight.  · Limit how much fluid you drink. Ask your health care provider or dietitian how much fluid you can have each day.  · Limit or avoid alcohol as told by your health care provider or dietitian.  Recommended foods  The items listed may not be a complete list. Talk with your dietitian about what dietary choices are best for you.  Fruits  All fresh, frozen, and canned fruits. Dried fruits, such as raisins, prunes, and cranberries.  Vegetables  All fresh vegetables. Vegetables that are frozen without sauce or added salt. Low-sodium or sodium-free canned vegetables.  Grains  Bread with less than 80 mg of sodium per slice. Whole-wheat pasta, quinoa, and brown rice. Oats and oatmeal. Barley. Millet. Grits and cream of wheat. Whole-grain and whole-wheat cold cereal.  Meats and other protein foods  Lean cuts of meat. Skinless chicken and turkey. Fish with high  omega-3 fatty acids, such as salmon, sardines, and other cold-water fishes. Eggs. Dried beans, peas, and edamame. Unsalted nuts and nut butters.  Dairy  Low-fat or nonfat (skim) milk and dried milk. Rice milk, soy milk, and almond milk. Low-fat or nonfat yogurt. Small amounts of reduced-sodium block cheese. Low-sodium cottage cheese.  Fats and oils  Olive, canola, soybean, flaxseed, or sunflower oil. Avocado.  Sweets and desserts  Apple sauce. Granola bars. Sugar-free pudding and gelatin. Frozen fruit bars.  Seasoning and other foods  Fresh and dried herbs. Lemon or lime juice. Vinegar. Low-sodium ketchup. Salt-free marinades, salad dressings, sauces, and seasonings.  The items listed above may not be a complete list of foods and beverages you can eat. Contact a dietitian for more information.  Foods to avoid  The items listed may not be a complete list. Talk with your dietitian about what dietary choices are best for you.  Fruits  Fruits that are dried with sodium-containing preservatives.  Vegetables  Canned vegetables. Frozen vegetables with sauce or seasonings. Creamed vegetables. French fries. Onion rings. Pickled vegetables and sauerkraut.  Grains  Bread with more than 80 mg of sodium per slice. Hot or cold cereal with more than 140 mg sodium per serving. Salted pretzels and crackers. Pre-packaged breadcrumbs. Bagels, croissants, and biscuits.  Meats and other protein foods  Ribs and chicken wings. [place], ham, pepperoni, bologna, salami, and packaged luncheon meats. Hot dogs, bratwurst, and sausage. Canned meat. Smoked meat and fish. Salted nuts and seeds.  Dairy  Whole milk, half-and-half, and cream. Buttermilk. Processed cheese, cheese spreads, and cheese curds. Regular cottage cheese. Feta cheese. Shredded cheese. String cheese.  Fats and oils  Butter, lard, shortening, ghee, and [place] fat. Canned and packaged gravies.  Seasoning and other foods  Onion salt, garlic salt, table salt, and sea salt. Marinades.  Regular salad dressings. Relishes, pickles, and olives. Meat flavorings and tenderizers, and bouillon cubes. Horseradish, ketchup, and mustard. Worcestershire sauce. Teriyaki sauce, soy sauce (including reduced sodium). Hot sauce and Tabasco sauce. Steak sauce, fish sauce, oyster sauce, and cocktail sauce. Taco seasonings. Barbecue sauce. Tartar sauce.  The items listed above may not be a complete list of foods and beverages you should avoid. Contact a dietitian for more information.  Summary  · A heart failure eating plan includes changes that limit your intake of sodium and unhealthy fat, and it may help you lose weight or maintain a healthy weight. Your health care provider may also recommend limiting how much fluid you drink.  · Most people with heart failure should eat no more than 2,300 mg of salt (sodium) a day. The amount of sodium that is recommended for you may be lower, depending on your condition.  · Contact your health care provider or dietitian before making any major changes to your diet.  This information is not intended to replace advice given to you by your health care provider. Make sure you discuss any questions you have with your health care provider.  Document Released: 05/04/2018 Document Revised: 02/13/2020 Document Reviewed: 05/04/2018  Elsevier Patient Education © 2020 [employer]

## 2020-10-24 NOTE — PROGRESS NOTES
Patient discharged home escorted via wheelchair by staff. All belongings gathered and taken with patient upon discharge. Discharge paperwork reviewed and patient verbalized understanding. Signed copy placed in chart. IV removed.     Patient taken home by taxi, taxi voucher given to .

## 2020-11-17 ENCOUNTER — HOSPITAL ENCOUNTER (OUTPATIENT)
Dept: RADIOLOGY | Facility: MEDICAL CENTER | Age: 80
End: 2020-11-17
Attending: INTERNAL MEDICINE
Payer: COMMERCIAL

## 2020-11-17 DIAGNOSIS — D48.61 NEOPLASM OF UNCERTAIN BEHAVIOR OF RIGHT BREAST: ICD-10-CM

## 2020-11-17 PROCEDURE — 77066 DX MAMMO INCL CAD BI: CPT

## 2020-11-17 PROCEDURE — 76642 ULTRASOUND BREAST LIMITED: CPT | Mod: RT

## 2020-11-25 ENCOUNTER — HOSPITAL ENCOUNTER (OUTPATIENT)
Dept: RADIOLOGY | Facility: MEDICAL CENTER | Age: 80
End: 2020-11-25
Attending: INTERNAL MEDICINE
Payer: COMMERCIAL

## 2020-11-25 DIAGNOSIS — R92.8 ABNORMAL FINDINGS ON DIAGNOSTIC IMAGING OF BREAST: ICD-10-CM

## 2020-11-25 LAB — PATHOLOGY CONSULT NOTE: NORMAL

## 2020-11-25 PROCEDURE — 88305 TISSUE EXAM BY PATHOLOGIST: CPT

## 2020-11-25 PROCEDURE — 10035 PLMT SFT TISS LOCLZJ DEV 1ST: CPT

## 2020-11-25 PROCEDURE — 88360 TUMOR IMMUNOHISTOCHEM/MANUAL: CPT | Mod: 91

## 2020-11-25 PROCEDURE — 38505 NEEDLE BIOPSY LYMPH NODES: CPT

## 2020-11-25 PROCEDURE — 88341 IMHCHEM/IMCYTCHM EA ADD ANTB: CPT | Mod: 91,XU

## 2020-11-25 PROCEDURE — 19083 BX BREAST 1ST LESION US IMAG: CPT | Mod: RT

## 2020-11-25 PROCEDURE — 88342 IMHCHEM/IMCYTCHM 1ST ANTB: CPT | Mod: XU

## 2020-11-25 PROCEDURE — 19286 PERQ DEV BREAST ADD US IMAG: CPT

## 2020-11-30 ENCOUNTER — TELEPHONE (OUTPATIENT)
Dept: RADIOLOGY | Facility: MEDICAL CENTER | Age: 80
End: 2020-11-30

## 2020-11-30 NOTE — TELEPHONE ENCOUNTER
Called Dr Pinedo @ [facility] to confirm office has received breast bx results and to start urgent referral to a breast surgeon. Office states the request has been sent to Dr Pinedo.

## 2020-12-16 ENCOUNTER — PATIENT OUTREACH (OUTPATIENT)
Dept: HEALTH INFORMATION MANAGEMENT | Facility: OTHER | Age: 80
End: 2020-12-16

## 2020-12-16 NOTE — PROGRESS NOTES
"Oncology Nurse Navigation  Phoned for f/u.  Pt not available.  Pt's wife Melissa states pt has been seen by a surgeon and a medical oncologist.  She does not remember their names but states [facility]  She states he underwent a PET scan but do not yet know the results.  She reports that they have been told that pt is likely not a candidate for surgery or chemo.  She states she would like to \"save his life\".  However she states they have talked together and value quality of life.  Provided my contact information and encouraged her to call should she have any questions.  Will be available for navigation should pt receive tx at [facility].      "

## 2021-01-15 DIAGNOSIS — Z23 NEED FOR VACCINATION: ICD-10-CM

## 2021-08-18 NOTE — PROGRESS NOTES
Internal Medicine Interval Note  Note Author: Aure Moreno M.D.     Name Bg Andrea 1940   Age/Sex 77 y.o. male   MRN [id number]   Code Status Full     After 5PM or if no immediate response to page, please call for cross-coverage  Attending/Team: Dr. Zamudio/WALLY Cabrera See Patient List for primary contact information  Call [phone] to page    1st Call - Day Intern (R1):   Dr Moreno 2nd Call - Day Sr. Resident (R2/R3):   Dr Trimble         Reason for interval visit  (Principal Problem)   CVA      Interval Problem Daily Status Update  (24 hours, problem oriented, brief subjective history, new lab/imaging data pertinent to that problem)   Pts procedure was delayed due to an emergency case yesterday. He wasable to eat dinner and has been NPO since midnight. Pts BP has been very high since admission. High of 202/112 last night. IR said to keep systolic below 160, so I called the pt's nurse and asked to please give labetolol 10 mg IV if systolic > 160. Pt is scheduled for procedure today. Spoke with IR team and they said probably will be done after the 10 AM case. Pt has no neurological symptoms and is up and ambulating while I'm in the room.   Review of Systems   Constitutional: Negative for chills and fever.   Eyes: Negative for blurred vision and double vision.   Cardiovascular: Negative for chest pain and palpitations.   Neurological: Negative for dizziness, tingling, tremors, sensory change, speech change, focal weakness and headaches.       Disposition/Barriers to discharge:   Patient needs to continue as an inpatient for evaluation and treatment of his CVA.     Consultants/Specialty  IR - Dr. Burrows  PCP: @PCP      Quality Measures  Quality-Core Measures   Reviewed items::  Labs reviewed and Medications reviewed  Matt catheter::  No Matt          Physical Exam       Vitals:    18 0526 18 0900 18 1000 18 1100   BP: (!) 174/78 (!) 174/94 (!) 192/94 (!) 168/84  Vaccine Information Statement    Hepatitis A Vaccine: What You Need to Know    Many Vaccine Information Statements are available in [place] and other languages. See www.immunize.org/vis  Hojas de información sobre vacunas están disponibles en español y en muchos otros idiomas. Visite www.immunize.org/vis    1. Why get vaccinated? Hepatitis A vaccine can prevent hepatitis A. Hepatitis A is a serious liver disease. It is usually spread through close personal contact with an infected person or when a person unknowingly ingests the virus from objects, food, or drinks that are contaminated by small amounts of stool (poop) from an infected person. Most adults with hepatitis A have symptoms, including fatigue, low appetite, stomach pain, nausea, and jaundice (yellow skin or eyes, dark urine, light colored bowel movements). Most children less than 10years of age do not have symptoms. A person infected with hepatitis A can transmit the disease to other people even if he or she does not have any symptoms of the disease. Most people who get hepatitis A feel sick for several weeks, but they usually recover completely and do not have lasting liver damage. In rare cases, hepatitis A can cause liver failure and death; this is more common in people older than 48 and in people with other liver diseases. Hepatitis A vaccine has made this disease much less common in the [place]. However, outbreaks of hepatitis A among unvaccinated people still happen. 2. Hepatitis A vaccine    Children need 2 doses of hepatitis A vaccine:   First dose: 12 through 21months of age   Edwin Duke Second dose: at least 6 months after the first dose     Older children and adolescents 2 through 25years of age who were not vaccinated previously should be vaccinated. Adults who were not vaccinated previously and want to be protected against hepatitis A can also get the vaccine.       Hepatitis A vaccine is recommended for the   Pulse:  69 66 70   Resp:  18     Temp:  36.6 °C (97.8 °F)     SpO2:  94%     Weight:       Height:         Body mass index is 26.9 kg/m². Weight: 77.9 kg (171 lb 11.8 oz)  Oxygen Therapy:  Pulse Oximetry: 94 %, O2 (LPM): 0, O2 Delivery: None (Room Air)    Physical Exam  Constitutional: He is oriented to person, place, and time and well-developed, well-nourished, and in no distress.   HENT:   Head: Normocephalic and atraumatic.   Eyes: Pupils are equal, round, and reactive to light.   Cardiovascular: Normal rate and regular rhythm.    Murmur heard.  Neurological: He is alert and oriented to person, place, and time. He has normal sensation, normal strength and intact cranial nerves. He is not disoriented. He displays no weakness, no tremor and facial symmetry. He has a normal Cerebellar Exam. He shows no pronator drift. GCS score is 15.   Skin: Skin is warm and dry.   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 Did not receive tPA, was on Plavix/Aspirin when arrived. Clinically has completely recovered and had no complaints.     Plan:  Will be seen by IR today for procedure;has been NPO at midnight with NS @ 83mL/hr  Holding pharmacologic DVT prophylaxis for high risk of conversion to hemorrhagic stroke - SCD  Continue Plavix 75/Aspirin 81 dual antiplatelet therapy  PT, swallow eval by speech ordered  Fall/sz precautions  Atorvastatin 80 mg qd started  Neuro checks Q4H  Per IR would like BP control - increased home losartan 50 to 100 mg qd and increased amlodipine to 5mg qd from 2.5 mg qd on 7/3/18  Continue on tele to ensure a fib is not contributing          HTN (hypertension)   Assessment & Plan    -[facility] d/c permissive HTN and would like HTN to be treated, likely because >48h have  following people:   All children aged 1625 months   Unvaccinated children and adolescents aged 319 years   International travelers   Men who have sex with men   People who use injection or non-injection drugs   People who have occupational risk for infection   People who anticipate close contact with an international adoptee   People experiencing homelessness   People with HIV   People with chronic liver disease   Any person wishing to obtain immunity (protection)    In addition, a person who has not previously received hepatitis A vaccine and who has direct contact with someone with hepatitis A should get hepatitis A vaccine within 2 weeks after exposure. Hepatitis A vaccine may be given at the same time as other vaccines. 3. Talk with your health care provider    Tell your vaccine provider if the person getting the vaccine:   Has had an allergic reaction after a previous dose of hepatitis A vaccine, or has any severe, life-threatening allergies. In some cases, your health care provider may decide to postpone hepatitis A vaccination to a future visit. People with minor illnesses, such as a cold, may be vaccinated. People who are moderately or severely ill should usually wait until they recover before getting hepatitis A vaccine. Your health care provider can give you more information. 4. Risks of a vaccine reaction     Soreness or redness where the shot is given, fever, headache, tiredness, or loss of appetite can happen after hepatitis A vaccine. People sometimes faint after medical procedures, including vaccination. Tell your provider if you feel dizzy or have vision changes or ringing in the ears. As with any medicine, there is a very remote chance of a vaccine causing a severe allergic reaction, other serious injury, or death. 5. What if there is a serious problem? An allergic reaction could occur after the vaccinated person leaves the clinic.  If you see signs of a passed since CVA    Plan:  -Continue losartan 100 mg and inc amlodipine to 5 mg qd due to BP up to 180/80  -labetolol prn BP > 190/110 q 4 hr  -attempting to keep systolic < 160 for procedure, gave labetolol 10 mg IV three times in the past 24 hours          COPD (chronic obstructive pulmonary disease) (HCC)- (present on admission)   Assessment & Plan    History of COPD on albuterol/ipratroprium  RT protocol          Severe aortic stenosis- (present on admission)   Assessment & Plan    [facility]  [facility] reviewed and no significant findings               severe allergic reaction (hives, swelling of the face and throat, difficulty breathing, a fast heartbeat, dizziness, or weakness), call 9-1-1 and get the person to the nearest hospital.    For other signs that concern you, call your health care provider. Adverse reactions should be reported to the Vaccine Adverse Event Reporting System (VAERS). Your health care provider will usually file this report, or you can do it yourself. Visit the VAERS website at www.vaers. hhs.gov or call 1-690.723.9911. VAERS is only for reporting reactions, and VAERS staff do not give medical advice. 6. The National Vaccine Injury Compensation Program    The [facility] Vaccine Injury Compensation Program (VICP) is a federal program that was created to compensate people who may have been injured by certain vaccines. Visit the VICP website at www.[facility].gov/vaccinecompensation or call 8-304.628.3804 to learn about the program and about filing a claim. There is a time limit to file a claim for compensation. 7. How can I learn more?  Ask your health care provider.  Call your local or state health department.  Contact the Centers for Disease Control and Prevention (CDC):  - Call 3-182.610.5047 (9-529-TEI-INFO) or  - Visit CDCs website at www.cdc.gov/vaccines    Vaccine Information Statement (Interim)  Hepatitis A Vaccine   2020  [address] Hemant [id number]-[id number]   Department of Health and Human Services  Centers for Disease Control and Prevention

## 2021-10-22 ENCOUNTER — HOSPITAL ENCOUNTER (OUTPATIENT)
Dept: RADIOLOGY | Facility: MEDICAL CENTER | Age: 81
End: 2021-10-22

## 2021-10-22 ENCOUNTER — HOSPITAL ENCOUNTER (OUTPATIENT)
Dept: RADIATION ONCOLOGY | Facility: MEDICAL CENTER | Age: 81
End: 2021-10-31
Attending: RADIOLOGY
Payer: COMMERCIAL

## 2021-10-22 ENCOUNTER — HOSPITAL ENCOUNTER (OUTPATIENT)
Dept: RADIOLOGY | Facility: MEDICAL CENTER | Age: 81
End: 2021-10-22
Attending: INTERNAL MEDICINE

## 2021-10-22 VITALS
HEART RATE: 65 BPM | DIASTOLIC BLOOD PRESSURE: 70 MMHG | OXYGEN SATURATION: 95 % | WEIGHT: 156 LBS | HEIGHT: 67 IN | TEMPERATURE: 97.8 F | BODY MASS INDEX: 24.48 KG/M2 | SYSTOLIC BLOOD PRESSURE: 121 MMHG

## 2021-10-22 DIAGNOSIS — Z65.8 PSYCHOSOCIAL DISTRESS: ICD-10-CM

## 2021-10-22 PROCEDURE — 99214 OFFICE O/P EST MOD 30 MIN: CPT | Performed by: RADIOLOGY

## 2021-10-22 PROCEDURE — 99203 OFFICE O/P NEW LOW 30 MIN: CPT | Performed by: RADIOLOGY

## 2021-10-22 RX ORDER — ATORVASTATIN CALCIUM 40 MG/1
40 TABLET, FILM COATED ORAL NIGHTLY
COMMUNITY

## 2021-10-22 RX ORDER — HYDROCODONE BITARTRATE AND ACETAMINOPHEN 5; 325 MG/1; MG/1
1 TABLET ORAL EVERY 4 HOURS PRN
COMMUNITY
End: 2021-12-22 | Stop reason: SDUPTHER

## 2021-10-22 RX ORDER — TAMOXIFEN CITRATE 10 MG/1
10 TABLET ORAL 2 TIMES DAILY
COMMUNITY

## 2021-10-22 RX ORDER — METOPROLOL SUCCINATE 25 MG/1
25 TABLET, EXTENDED RELEASE ORAL DAILY
COMMUNITY

## 2021-10-22 ASSESSMENT — PAIN SCALES - GENERAL: PAINLEVEL: NO PAIN

## 2021-10-22 ASSESSMENT — FIBROSIS 4 INDEX: FIB4 SCORE: 0.69

## 2021-10-22 NOTE — CONSULTS
RADIATION ONCOLOGY CONSULT    DATE OF SERVICE: 10/22/2021    IDENTIFICATION: A 81 y.o. male with right breast cancer T4N3 grade 2 [id number], HER-2/елена FISH negative, Ki-67 15%.  He is here at the kind request of Dr. Gregory.      HISTORY OF PRESENT ILLNESS: Patient's history dates back to late 2020 when he noted some oozing and crusting on his right nipple.  He underwent mammogram and ultrasound on 11/7/2020.  This revealed a 3.4 cm solid mass in the right retroareolar region explaining the area of mammographic and  palpable concern. There was also right axillary adenopathy and biopsy was recommended. Biopsy 11/25/2020 of the right breast retroareolar mass showed an invasive ductal carcinoma the right axillary lymph node had a provisional grade 2 metastatic breast cancer ER  [place]  Ki-67 15% HER-2/елена IHC 2+ FISH negative.He has been on tamoxifen.He had an initial PET/CT 12/31/2020 and more recently an additional scan has been done 9/22/2021.  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  The chest right breast mass measures 3.9 x 2.6 with an SUV of 12.7 it previously measured 2.9 x 2.5 with an SUV of 11.  There is multiple FDG avid right subpectoral and right axillary lymph nodes some of which are avid now that were not previously avid.He was presented in the tumor board and it was felt that he should be seen in radiation therapy about local regional options.  [facility] is also been [facility] recommendations as well.  Currently he is doing well he has a little bit of sensitivity in this area but otherwise is fine.    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   • COPD (chronic obstructive pulmonary disease) (HCC)    • COVID-19 virus infection 10/22/2020   • Dementia (HCC) 12/26/2018   • Hypertension    • Malignant neoplasm of right male breast  ([facility])    • Pneumonia due to COVID-19 virus, chest pain w/ recent STEMI s/p stent to LAD    • Severe aortic stenosis    • Severe aortic stenosis 7/3/2018   • ST elevation myocardial infarction involving left anterior descending (LAD) coronary artery ([facility]) 9/7/2020   • Stroke ([facility])     6/30/18       PAST SURGICAL HISTORY:  Past Surgical History:   Procedure Laterality Date   • LOBECTOMY Left    • OTHER      right hand finger   • OTHER ORTHOPEDIC SURGERY      back sx x3       CURRENT MEDICATIONS:  Current Outpatient Medications   Medication Sig Dispense Refill   • atorvastatin (LIPITOR) 40 MG Tab Take 40 mg by mouth every evening.     • HYDROcodone-acetaminophen (NORCO) 5-325 MG Tab per tablet Take 1 Tablet by mouth every four hours as needed.     • metoprolol SR (TOPROL XL) 25 MG TABLET SR 24 HR Take 25 mg by mouth every day.     • tamoxifen (NOLVADEX) 10 MG Tab Take 10 mg by mouth 2 times a day.     • Multiple Vitamin (MULTIVITAMIN ADULT PO) Take  by mouth.     • acetaminophen (TYLENOL) 325 MG Tab Take 2 Tabs by mouth every 6 hours as needed (Mild Pain; (Pain scale 1-3); Temp greater than 100.5 F). 30 Tab 0   • ascorbic acid (VITAMIN C) 500 MG tablet Take 1 Tab by mouth every day. 15 Tab 0   • amLODIPine (NORVASC) 10 MG Tab Take 10 mg by mouth every day.     • thiamine (VITAMIN B-1) 100 MG Tab Take 100 mg by mouth every day.     • folic acid (FOLVITE) 1 MG Tab Take 1 mg by mouth every day.     • ticagrelor (BRILINTA) 90 MG Tab tablet Take 1 Tab by mouth 2 Times a Day. 60 Tab 0   • aspirin EC 81 MG EC tablet Take 1 Tab by mouth every day. 100 Tab 0   • losartan (COZAAR) 50 MG Tab Take 50 mg by mouth every day.     • Cholecalciferol 1000 UNIT Cap Take 1 Cap by mouth every day.     • Cyanocobalamin (VITAMIN B-12) 1000 MCG Tab Take 2,000 mcg by mouth every day.     • zinc sulfate (ZINCATE) 220 (50 Zn) MG Cap Take 1 Cap by mouth every day. (Patient not taking: Reported on 10/22/2021) 30 Cap 3   • benzonatate (TESSALON)  100 MG Cap Take 100 mg by mouth 3 times a day. (Patient not taking: Reported on 10/22/2021)     • loperamide (IMODIUM) 2 MG Cap Take 2 mg by mouth 4 times a day as needed for Diarrhea. (Patient not taking: Reported on 10/22/2021)     • guaifenesin dextromethorphan sugar free (DIABETIC TUSSIN DM)  MG/5ML Liquid soln Take 10 mL by mouth every four hours as needed for Cough. (Patient not taking: Reported on 10/22/2021)     • carvedilol (COREG) 3.125 MG Tab Take 1 Tab by mouth 2 times a day, with meals. (Patient not taking: Reported on 10/22/2021) 60 Tab 0     No current facility-administered medications for this encounter.       ALLERGIES:    Patient has no known allergies.    FAMILY HISTORY:    Family History   Problem Relation Age of Onset   • No Known Problems Mother    • No Known Problems Father    • Cancer Neg Hx    • Heart Disease Neg Hx    [unfilled]        SOCIAL HISTORY:     reports that he has been smoking cigarettes. He has a 60.00 pack-year smoking history. He has never used smokeless tobacco. He reports that he does not drink alcohol and does not use drugs.  Patient is an 81 year old male who resides in [place] with his wife. He is retired from [employer].     REVIEW OF SYSTEMS: Pertinent positives consist of  Breast mass occasional nipple discharge and pain.  His weight has dropped he has a decrease in appetite is fatigue he has chills.  He has hearing loss visual difficulties nocturia unsteady gait memory loss history of stroke and cough.  The rest of the review of systems is negative and has been reviewed.     PAIN:   Patient reports no acute/ chronic pain.       PHYSICAL EXAM:    1= Restricted in physically strenuous activity, but ambulatory and able to carry out work of a light sedentary nature, e.g., light housework, office work.  Vitals:    10/22/21 1025   BP: 121/70   BP Location: Right arm   Patient Position: Sitting   BP Cuff Size: Adult   Pulse: 65   Temp: 36.6 °C (97.8 °F)   TempSrc: Temporal  "  SpO2: 95%   Weight: 70.8 kg (156 lb)   Height: 1.702 m (5' 7\")   Pain Score: No pain        GENERAL: Well-appearing alert and oriented x2 somewhat deaf  Breasts: Right breast has crusting in the nipple with some oozing.  There is a large mass measuring about 4 cm in greatest dimension.  There is also palpable adenopathy in the right axilla.  There is also a small nodule on the superolateral area of the breast there is an erythematous plaque like lesion.  HEENT:  Pupils are equal, round, and reactive to light.  Extraocular muscles   are intact. Sclerae nonicteric.  Conjunctivae pink.  Oral cavity, tongue   protrudes midline.   NECK:   No peripheral adenopathy of the neck, supraclavicular fossa or axillae   bilaterally.  LUNGS:  Clear to ascultation   HEART:  Regular rate and rhythm.  No murmur appreciated  ABDOMEN:  Soft. No evidence of hepatosplenomegaly.    EXTREMITIES:  Without Edema.  NEUROLOGIC:  Cranial nerves II through XII were intact. Normal stance and gait motor and sensory grossly within normal limits    IMPRESSION:    A 81 y.o. with  locally advanced breast cancer progressing on tamoxifen..      RECOMMENDATIONS:   I had a long discussion with the patient and his wife.  I am interested to see the opinion of the surgeon.  I do feel that perhaps a lumpectomy to involve the nipple areolar complex is a good idea and I could follow it with palliative radiation therapy to the chest wall and draining lymphatics.If he is not felt to be a surgical candidate at all then I could deliver higher dose of radiation therapy.I will await the opinion of the surgeon.  And have the patient come in for follow-up in a couple months.  I've described the details of radiation along with the side effects both acute and chronic, including but not exclusive to fatigue, skin reaction, local soreness, swelling, delayed healing, scarring fibrosis discoloration. Ample time was allowed for questions, and patient understands.    Thank " you for the opportunity to participate in his care.  If any questions or comments, please do not hesitate in calling.    Please note that this dictation was created using voice recognition software. I have made every reasonable attempt to correct obvious errors, but I expect that there are errors of grammar and possibly content that I did not discover before finalizing the note.

## 2021-10-22 NOTE — NON-PROVIDER
"Patient was seen today in clinic with Dr. Sorenson for consultation.  Vitals signs and weight were obtained and pain assessment was completed.  Allergies and medications were reviewed with the patient.      Vitals/Pain:  Vitals:    10/22/21 1025   BP: 121/70   BP Location: Right arm   Patient Position: Sitting   BP Cuff Size: Adult   Pulse: 65   Temp: 36.6 °C (97.8 °F)   TempSrc: Temporal   SpO2: 95%   Weight: 70.8 kg (156 lb)   Height: 1.702 m (5' 7\")   Pain Score: No pain    Allergies:   Patient has no known allergies.    Current Medications:  Current Outpatient Medications   Medication Sig Dispense Refill   • atorvastatin (LIPITOR) 40 MG Tab Take 40 mg by mouth every evening.     • HYDROcodone-acetaminophen (NORCO) 5-325 MG Tab per tablet Take 1 Tablet by mouth every four hours as needed.     • metoprolol SR (TOPROL XL) 25 MG TABLET SR 24 HR Take 25 mg by mouth every day.     • tamoxifen (NOLVADEX) 10 MG Tab Take 10 mg by mouth 2 times a day.     • Multiple Vitamin (MULTIVITAMIN ADULT PO) Take  by mouth.     • acetaminophen (TYLENOL) 325 MG Tab Take 2 Tabs by mouth every 6 hours as needed (Mild Pain; (Pain scale 1-3); Temp greater than 100.5 F). 30 Tab 0   • ascorbic acid (VITAMIN C) 500 MG tablet Take 1 Tab by mouth every day. 15 Tab 0   • amLODIPine (NORVASC) 10 MG Tab Take 10 mg by mouth every day.     • thiamine (VITAMIN B-1) 100 MG Tab Take 100 mg by mouth every day.     • folic acid (FOLVITE) 1 MG Tab Take 1 mg by mouth every day.     • ticagrelor (BRILINTA) 90 MG Tab tablet Take 1 Tab by mouth 2 Times a Day. 60 Tab 0   • aspirin EC 81 MG EC tablet Take 1 Tab by mouth every day. 100 Tab 0   • losartan (COZAAR) 50 MG Tab Take 50 mg by mouth every day.     • Cholecalciferol 1000 UNIT Cap Take 1 Cap by mouth every day.     • Cyanocobalamin (VITAMIN B-12) 1000 MCG Tab Take 2,000 mcg by mouth every day.     • zinc sulfate (ZINCATE) 220 (50 Zn) MG Cap Take 1 Cap by mouth every day. (Patient not taking: Reported on " 10/22/2021) 30 Cap 3   • benzonatate (TESSALON) 100 MG Cap Take 100 mg by mouth 3 times a day. (Patient not taking: Reported on 10/22/2021)     • loperamide (IMODIUM) 2 MG Cap Take 2 mg by mouth 4 times a day as needed for Diarrhea. (Patient not taking: Reported on 10/22/2021)     • guaifenesin dextromethorphan sugar free (DIABETIC TUSSIN DM)  MG/5ML Liquid soln Take 10 mL by mouth every four hours as needed for Cough. (Patient not taking: Reported on 10/22/2021)     • carvedilol (COREG) 3.125 MG Tab Take 1 Tab by mouth 2 times a day, with meals. (Patient not taking: Reported on 10/22/2021) 60 Tab 0     No current facility-administered medications for this encounter.         PCP:  No primary care provider on file.        Angie Gonzalez R.N.

## 2021-11-01 ENCOUNTER — HOSPITAL ENCOUNTER (OUTPATIENT)
Dept: RADIATION ONCOLOGY | Facility: MEDICAL CENTER | Age: 81
End: 2021-11-30
Attending: RADIOLOGY
Payer: COMMERCIAL

## 2021-11-05 ENCOUNTER — PATIENT OUTREACH (OUTPATIENT)
Dept: OTHER | Facility: MEDICAL CENTER | Age: 81
End: 2021-11-05

## 2021-11-05 NOTE — PROGRESS NOTES
Phoned pt for follow up.  Wife states that [facility] (she is unsure of their name) who stated that surgery is not recommended.  Wife states they are unsure if he is to continue on Tamoxifen.  Wife will amber Dr. Gregory today as pt does not have any refills.  Pt's next appointment with Dr. Gregory scheduled for January.  Wife denies any barriers at this time.  Provided contact information and encouraged them to call should any questions or concerns arise.

## 2021-11-09 ENCOUNTER — PATIENT OUTREACH (OUTPATIENT)
Dept: OTHER | Facility: MEDICAL CENTER | Age: 81
End: 2021-11-09

## 2021-11-09 DIAGNOSIS — Z17.0 MALIGNANT NEOPLASM INVOLVING BOTH NIPPLE AND AREOLA OF RIGHT BREAST IN MALE, ESTROGEN RECEPTOR POSITIVE (HCC): ICD-10-CM

## 2021-11-09 DIAGNOSIS — C50.021 MALIGNANT NEOPLASM INVOLVING BOTH NIPPLE AND AREOLA OF RIGHT BREAST IN MALE, ESTROGEN RECEPTOR POSITIVE (HCC): ICD-10-CM

## 2021-11-09 NOTE — CT SIMULATION
PATIENT NAME Bg Andrea   PRIMARY PHYSICIAN No primary care provider on file. [id number]   REFERRING PHYSICIAN No ref. provider found 1940     No matching staging information was found for the patient.       Treatment Planning CT Simulation      Order Questions     Question Answer Comment    Is this for a new course of treatment? Yes     Is this an Addendum? No     Implanted Device/Pacemaker No     Simulation Status Initial     Treatment Technique IMRT     Other Technique(s) IMRT     Treatment Pattern/Frequency Daily     Concurrent Chemotherapy No     CT Technique 3D possible    Slice Thickness 3mm     Scan Extent Chest     Treatment Device(s) Vac Laureen      Wing Board     Treatment Marker Scar      Breast Borders     Patient Attire Gown     Patient Position Supine     Patient Orientation Head First     Arm Position Up     Treatment Machine No preference     Treatment Image Guidance CBCT CBCT for boost    Image Guidance Match PTV - Soft Tissue     Other Orders Weekly Physics Check

## 2021-11-10 ENCOUNTER — HOSPITAL ENCOUNTER (OUTPATIENT)
Dept: RADIATION ONCOLOGY | Facility: MEDICAL CENTER | Age: 81
End: 2021-11-10

## 2021-11-10 PROCEDURE — 77334 RADIATION TREATMENT AID(S): CPT | Performed by: RADIOLOGY

## 2021-11-10 PROCEDURE — 77263 THER RADIOLOGY TX PLNG CPLX: CPT | Performed by: RADIOLOGY

## 2021-11-10 PROCEDURE — 77290 THER RAD SIMULAJ FIELD CPLX: CPT | Performed by: RADIOLOGY

## 2021-11-10 PROCEDURE — 77334 RADIATION TREATMENT AID(S): CPT | Mod: 26 | Performed by: RADIOLOGY

## 2021-11-11 NOTE — PROGRESS NOTES
On November 9, 2021, Oncology Social Worker Suha Cuninngham attempted telephone contact with pt. via telephone.  Pt.'s , Mandi answered the phone.  Mandi shared pt. was hard of hearing.  Mandi shared neither one of them drive.  Mandi shared her son lives in town but works and cannot take time off.  Mandi shared her radiation oncologist informed them she would get them transportation arrived.  OSW Octavio asked if they had [facility] out to [facility] to find out about resources.  Mandi shared she can get them scheduled for RTC Access  for appointment but shared concerns of financial concerns for cost of transportation.  OSW Octavio informed Mandi she would leave a [facility] application for them to  at Radiation Oncology  to complete to request assistance.  Mandi agreed to  to complete with pt.

## 2021-11-12 ENCOUNTER — PATIENT OUTREACH (OUTPATIENT)
Dept: OTHER | Facility: MEDICAL CENTER | Age: 81
End: 2021-11-12

## 2021-11-12 NOTE — PROGRESS NOTES
"On November 12, 2021, Oncology Social Worker Suha Cunningham contacted pt.'s wife via telephone.  Mandi shared they did  [facility] application but have not completed it as her mother was in the hospital.  OSNICHOLAS Cunningham informed Mandi she would leave a booklet of 10 RTC Access tickets for pt.'s appointment in Radiation Oncology next Wednesday November 17, 2021 and reminded her to complete application to bring in for that day.  Mandi agreed to do so.  OSNICHOLAS Cunningham provided pt.'s wife with emotional support as she shared her mother is not doing well and they are \"just doing everything to make her comfortable.\"  "

## 2021-11-16 PROCEDURE — 77301 RADIOTHERAPY DOSE PLAN IMRT: CPT | Mod: 26 | Performed by: RADIOLOGY

## 2021-11-16 PROCEDURE — 77333 RADIATION TREATMENT AID(S): CPT | Mod: XU | Performed by: RADIOLOGY

## 2021-11-16 PROCEDURE — 77338 DESIGN MLC DEVICE FOR IMRT: CPT | Performed by: RADIOLOGY

## 2021-11-16 PROCEDURE — 77300 RADIATION THERAPY DOSE PLAN: CPT | Performed by: RADIOLOGY

## 2021-11-16 PROCEDURE — 77300 RADIATION THERAPY DOSE PLAN: CPT | Mod: 26 | Performed by: RADIOLOGY

## 2021-11-16 PROCEDURE — 77333 RADIATION TREATMENT AID(S): CPT | Mod: 26 | Performed by: RADIOLOGY

## 2021-11-16 PROCEDURE — 77301 RADIOTHERAPY DOSE PLAN IMRT: CPT | Performed by: RADIOLOGY

## 2021-11-16 PROCEDURE — 77338 DESIGN MLC DEVICE FOR IMRT: CPT | Mod: 26 | Performed by: RADIOLOGY

## 2021-11-17 ENCOUNTER — HOSPITAL ENCOUNTER (OUTPATIENT)
Dept: RADIATION ONCOLOGY | Facility: MEDICAL CENTER | Age: 81
End: 2021-11-17
Payer: COMMERCIAL

## 2021-11-17 VITALS
TEMPERATURE: 98.7 F | SYSTOLIC BLOOD PRESSURE: 136 MMHG | DIASTOLIC BLOOD PRESSURE: 69 MMHG | OXYGEN SATURATION: 96 % | HEART RATE: 60 BPM

## 2021-11-17 LAB
CHEMOTHERAPY INFUSION START DATE: NORMAL
CHEMOTHERAPY RECORDS: 2.5
CHEMOTHERAPY RECORDS: 6250
CHEMOTHERAPY RECORDS: NORMAL
CHEMOTHERAPY RX CANCER: NORMAL
DATE 1ST CHEMO CANCER: NORMAL
RAD ONC ARIA COURSE LAST TREATMENT DATE: NORMAL
RAD ONC ARIA COURSE TREATMENT ELAPSED DAYS: NORMAL
RAD ONC ARIA REFERENCE POINT DOSAGE GIVEN TO DATE: 2.5
RAD ONC ARIA REFERENCE POINT DOSAGE GIVEN TO DATE: 2.62
RAD ONC ARIA REFERENCE POINT ID: NORMAL
RAD ONC ARIA REFERENCE POINT ID: NORMAL
RAD ONC ARIA REFERENCE POINT SESSION DOSAGE GIVEN: 2.5
RAD ONC ARIA REFERENCE POINT SESSION DOSAGE GIVEN: 2.62

## 2021-11-17 PROCEDURE — 77385 HCHG IMRT DELIVERY SIMPLE: CPT | Performed by: RADIOLOGY

## 2021-11-17 PROCEDURE — 77280 THER RAD SIMULAJ FIELD SMPL: CPT | Performed by: RADIOLOGY

## 2021-11-17 ASSESSMENT — PAIN SCALES - GENERAL: PAINLEVEL: NO PAIN

## 2021-11-17 NOTE — ADDENDUM NOTE
Encounter addended by: Mariel Richards, Med Ass't on: 11/17/2021 2:36 PM   Actions taken: Chief Complaint modified, Vitals modified, Flowsheet accepted

## 2021-11-17 NOTE — ON TREATMENT VISIT
ON TREATMENT NOTE  RADIATION ONCOLOGY DEPARTMENT    Patient name:  Bg Andrea    Primary Physician:  No primary care provider on file. MRN: [id number]  CSN: [id number]   Referring physician:  Marina Gregory M.D. : 1940, 81 y.o.     ENCOUNTER DATE:  21    DIAGNOSIS:    No matching staging information was found for the patient.    TREATMENT SUMMARY:  Radiation Treatments     Active   Plans   R_Breast   Most recent treatment: Dose planned: 250 cGy (fraction 1 of 25 on 2021)   Total: Dose planned: 6,250 cGy   Elapsed Days: 0 @ [id number]      Reference Points   R_Breast   Most recent treatment: Dose given: 250 cGy (on 2021)   Total: Dose given: 250 cGy   Elapsed Days: 0 @ [id number]      R_Breast CP   Most recent treatment: Dose given: 262 cGy (on 2021)   Total: Dose given: 262 cGy   Elapsed Days: 0 @ [id number]                    SUBJECTIVE:   Tolerated first treatment without event      VITAL SIGNS:  There were no vitals taken for this visit.      Pain Assessment 10/22/2021   Pain Score 0   Some recent data might be hidden          PHYSICAL EXAM:    No erythema    No flowsheet data found.      IMPRESSION:  Cancer Staging  No matching staging information was found for the patient.    PLAN:  No change in treatment plan    Disposition:  Treatment plan reviewed. Questions answered. Continue therapy outlined.     Chaparrita Sorenson M.D.    No orders of the defined types were placed in this encounter.

## 2021-11-18 ENCOUNTER — HOSPITAL ENCOUNTER (OUTPATIENT)
Dept: RADIATION ONCOLOGY | Facility: MEDICAL CENTER | Age: 81
End: 2021-11-18
Payer: COMMERCIAL

## 2021-11-18 LAB
CHEMOTHERAPY INFUSION START DATE: NORMAL
CHEMOTHERAPY RECORDS: 2.5
CHEMOTHERAPY RECORDS: 6250
CHEMOTHERAPY RECORDS: NORMAL
CHEMOTHERAPY RX CANCER: NORMAL
DATE 1ST CHEMO CANCER: NORMAL
RAD ONC ARIA COURSE LAST TREATMENT DATE: NORMAL
RAD ONC ARIA COURSE TREATMENT ELAPSED DAYS: NORMAL
RAD ONC ARIA REFERENCE POINT DOSAGE GIVEN TO DATE: 5
RAD ONC ARIA REFERENCE POINT DOSAGE GIVEN TO DATE: 5.24
RAD ONC ARIA REFERENCE POINT ID: NORMAL
RAD ONC ARIA REFERENCE POINT ID: NORMAL
RAD ONC ARIA REFERENCE POINT SESSION DOSAGE GIVEN: 2.5
RAD ONC ARIA REFERENCE POINT SESSION DOSAGE GIVEN: 2.62

## 2021-11-18 PROCEDURE — 77385 HCHG IMRT DELIVERY SIMPLE: CPT | Performed by: RADIOLOGY

## 2021-11-18 PROCEDURE — 77014 PR CT GUIDANCE PLACEMENT RAD THERAPY FIELDS: CPT | Mod: 26 | Performed by: RADIOLOGY

## 2021-11-19 ENCOUNTER — HOSPITAL ENCOUNTER (OUTPATIENT)
Dept: RADIATION ONCOLOGY | Facility: MEDICAL CENTER | Age: 81
End: 2021-11-19
Payer: COMMERCIAL

## 2021-11-19 LAB
CHEMOTHERAPY INFUSION START DATE: NORMAL
CHEMOTHERAPY RECORDS: 2.5
CHEMOTHERAPY RECORDS: 6250
CHEMOTHERAPY RECORDS: NORMAL
CHEMOTHERAPY RX CANCER: NORMAL
DATE 1ST CHEMO CANCER: NORMAL
RAD ONC ARIA COURSE LAST TREATMENT DATE: NORMAL
RAD ONC ARIA COURSE TREATMENT ELAPSED DAYS: NORMAL
RAD ONC ARIA REFERENCE POINT DOSAGE GIVEN TO DATE: 7.5
RAD ONC ARIA REFERENCE POINT DOSAGE GIVEN TO DATE: 7.86
RAD ONC ARIA REFERENCE POINT ID: NORMAL
RAD ONC ARIA REFERENCE POINT ID: NORMAL
RAD ONC ARIA REFERENCE POINT SESSION DOSAGE GIVEN: 2.5
RAD ONC ARIA REFERENCE POINT SESSION DOSAGE GIVEN: 2.62

## 2021-11-19 PROCEDURE — 77385 HCHG IMRT DELIVERY SIMPLE: CPT | Performed by: RADIOLOGY

## 2021-11-19 PROCEDURE — 77014 PR CT GUIDANCE PLACEMENT RAD THERAPY FIELDS: CPT | Mod: 26 | Performed by: RADIOLOGY

## 2021-11-19 PROCEDURE — 77336 RADIATION PHYSICS CONSULT: CPT | Performed by: RADIOLOGY

## 2021-11-21 ENCOUNTER — HOSPITAL ENCOUNTER (OUTPATIENT)
Dept: RADIATION ONCOLOGY | Facility: MEDICAL CENTER | Age: 81
End: 2021-11-21
Payer: COMMERCIAL

## 2021-11-21 LAB
CHEMOTHERAPY INFUSION START DATE: NORMAL
CHEMOTHERAPY RECORDS: 2.5
CHEMOTHERAPY RECORDS: 6250
CHEMOTHERAPY RECORDS: NORMAL
CHEMOTHERAPY RX CANCER: NORMAL
DATE 1ST CHEMO CANCER: NORMAL
RAD ONC ARIA COURSE LAST TREATMENT DATE: NORMAL
RAD ONC ARIA COURSE TREATMENT ELAPSED DAYS: NORMAL
RAD ONC ARIA REFERENCE POINT DOSAGE GIVEN TO DATE: 10
RAD ONC ARIA REFERENCE POINT DOSAGE GIVEN TO DATE: 10.47
RAD ONC ARIA REFERENCE POINT ID: NORMAL
RAD ONC ARIA REFERENCE POINT ID: NORMAL
RAD ONC ARIA REFERENCE POINT SESSION DOSAGE GIVEN: 2.5
RAD ONC ARIA REFERENCE POINT SESSION DOSAGE GIVEN: 2.62

## 2021-11-21 PROCEDURE — 77385 HCHG IMRT DELIVERY SIMPLE: CPT | Performed by: RADIOLOGY

## 2021-11-21 PROCEDURE — 77014 PR CT GUIDANCE PLACEMENT RAD THERAPY FIELDS: CPT | Mod: 26 | Performed by: RADIOLOGY

## 2021-11-22 ENCOUNTER — HOSPITAL ENCOUNTER (OUTPATIENT)
Dept: RADIATION ONCOLOGY | Facility: MEDICAL CENTER | Age: 81
End: 2021-11-22
Payer: COMMERCIAL

## 2021-11-22 LAB
CHEMOTHERAPY INFUSION START DATE: NORMAL
CHEMOTHERAPY RECORDS: 2.5
CHEMOTHERAPY RECORDS: 6250
CHEMOTHERAPY RECORDS: NORMAL
CHEMOTHERAPY RX CANCER: NORMAL
DATE 1ST CHEMO CANCER: NORMAL
RAD ONC ARIA COURSE LAST TREATMENT DATE: NORMAL
RAD ONC ARIA COURSE TREATMENT ELAPSED DAYS: NORMAL
RAD ONC ARIA REFERENCE POINT DOSAGE GIVEN TO DATE: 12.5
RAD ONC ARIA REFERENCE POINT DOSAGE GIVEN TO DATE: 13.09
RAD ONC ARIA REFERENCE POINT ID: NORMAL
RAD ONC ARIA REFERENCE POINT ID: NORMAL
RAD ONC ARIA REFERENCE POINT SESSION DOSAGE GIVEN: 2.5
RAD ONC ARIA REFERENCE POINT SESSION DOSAGE GIVEN: 2.62

## 2021-11-22 PROCEDURE — 77014 PR CT GUIDANCE PLACEMENT RAD THERAPY FIELDS: CPT | Mod: 26 | Performed by: RADIOLOGY

## 2021-11-22 PROCEDURE — 77385 HCHG IMRT DELIVERY SIMPLE: CPT | Performed by: RADIOLOGY

## 2021-11-22 PROCEDURE — 77427 RADIATION TX MANAGEMENT X5: CPT | Performed by: RADIOLOGY

## 2021-11-23 ENCOUNTER — HOSPITAL ENCOUNTER (OUTPATIENT)
Dept: RADIATION ONCOLOGY | Facility: MEDICAL CENTER | Age: 81
End: 2021-11-23
Payer: COMMERCIAL

## 2021-11-23 LAB
CHEMOTHERAPY INFUSION START DATE: NORMAL
CHEMOTHERAPY RECORDS: 2.5
CHEMOTHERAPY RECORDS: 6250
CHEMOTHERAPY RECORDS: NORMAL
CHEMOTHERAPY RX CANCER: NORMAL
DATE 1ST CHEMO CANCER: NORMAL
RAD ONC ARIA COURSE LAST TREATMENT DATE: NORMAL
RAD ONC ARIA COURSE TREATMENT ELAPSED DAYS: NORMAL
RAD ONC ARIA REFERENCE POINT DOSAGE GIVEN TO DATE: 15
RAD ONC ARIA REFERENCE POINT DOSAGE GIVEN TO DATE: 15.71
RAD ONC ARIA REFERENCE POINT ID: NORMAL
RAD ONC ARIA REFERENCE POINT ID: NORMAL
RAD ONC ARIA REFERENCE POINT SESSION DOSAGE GIVEN: 2.5
RAD ONC ARIA REFERENCE POINT SESSION DOSAGE GIVEN: 2.62

## 2021-11-23 PROCEDURE — 77014 PR CT GUIDANCE PLACEMENT RAD THERAPY FIELDS: CPT | Mod: 26 | Performed by: RADIOLOGY

## 2021-11-23 PROCEDURE — 77385 HCHG IMRT DELIVERY SIMPLE: CPT | Performed by: RADIOLOGY

## 2021-11-24 ENCOUNTER — HOSPITAL ENCOUNTER (OUTPATIENT)
Dept: RADIATION ONCOLOGY | Facility: MEDICAL CENTER | Age: 81
End: 2021-11-24
Payer: COMMERCIAL

## 2021-11-24 VITALS
HEART RATE: 83 BPM | SYSTOLIC BLOOD PRESSURE: 139 MMHG | TEMPERATURE: 97.9 F | DIASTOLIC BLOOD PRESSURE: 75 MMHG | OXYGEN SATURATION: 95 %

## 2021-11-24 LAB
CHEMOTHERAPY INFUSION START DATE: NORMAL
CHEMOTHERAPY RECORDS: 2.5
CHEMOTHERAPY RECORDS: 6250
CHEMOTHERAPY RECORDS: NORMAL
CHEMOTHERAPY RX CANCER: NORMAL
DATE 1ST CHEMO CANCER: NORMAL
RAD ONC ARIA COURSE LAST TREATMENT DATE: NORMAL
RAD ONC ARIA COURSE TREATMENT ELAPSED DAYS: NORMAL
RAD ONC ARIA REFERENCE POINT DOSAGE GIVEN TO DATE: 17.5
RAD ONC ARIA REFERENCE POINT DOSAGE GIVEN TO DATE: 18.33
RAD ONC ARIA REFERENCE POINT ID: NORMAL
RAD ONC ARIA REFERENCE POINT ID: NORMAL
RAD ONC ARIA REFERENCE POINT SESSION DOSAGE GIVEN: 2.5
RAD ONC ARIA REFERENCE POINT SESSION DOSAGE GIVEN: 2.62

## 2021-11-24 PROCEDURE — 77385 HCHG IMRT DELIVERY SIMPLE: CPT | Performed by: RADIOLOGY

## 2021-11-24 PROCEDURE — 77014 PR CT GUIDANCE PLACEMENT RAD THERAPY FIELDS: CPT | Mod: 26 | Performed by: RADIOLOGY

## 2021-11-24 ASSESSMENT — PAIN SCALES - GENERAL: PAINLEVEL: NO PAIN

## 2021-11-24 NOTE — ON TREATMENT VISIT
ON TREATMENT NOTE  RADIATION ONCOLOGY DEPARTMENT    Patient name:  Bg Andrea    Primary Physician:  No primary care provider on file. MRN: [id number]  CSN: [id number]   Referring physician:  Marina Gregory M.D. : 1940, 81 y.o.     ENCOUNTER DATE:  21    DIAGNOSIS:    No matching staging information was found for the patient.    TREATMENT SUMMARY:  Radiation Treatments     Active   Plans   R_Breast   Most recent treatment: Dose planned: 250 cGy (fraction 7 of 25 on 2021)   Total: Dose planned: 6,250 cGy   Elapsed Days: 7 @ [id number]      Reference Points   R_Breast   Most recent treatment: Dose given: 250 cGy (on 2021)   Total: Dose given: 1,750 cGy   Elapsed Days: 7 @ [id number]      R_Breast CP   Most recent treatment: Dose given: 262 cGy (on 2021)   Total: Dose given: 1,833 cGy   Elapsed Days: 7 @ [id number]                    SUBJECTIVE:   Slight fatigue      VITAL SIGNS:  /75   Pulse 83   Temp 36.6 °C (97.9 °F)   SpO2 95%    Encounter Vitals  Temperature: 36.6 °C (97.9 °F)  Blood Pressure : 139/75  Pulse: 83  Pulse Oximetry: 95 %  Pain Score: No pain  Pain Assessment 2021 2021 10/22/2021   Pain Score 0 0 0   Some recent data might be hidden          PHYSICAL EXAM:    No erythema    Toxicity Assessment 2021   Toxicity Assessment Breast Breast   Fatigue (lethargy, malaise, asthenia) None None   Fever (in the absence of neutropenia) None None   Radiation Dermatitis None None   Lymphatics Normal Normal   RT - Pain due to RT None None   Dyspnea Normal Normal         IMPRESSION:  Cancer Staging  No matching staging information was found for the patient.    PLAN:  No change in treatment plan    Disposition:  Treatment plan reviewed. Questions answered. Continue therapy outlined.     Chaparrita Sorenson M.D.    No orders of the defined types were placed in this encounter.    
Anemia    Hypertension    Rheumatoid arthritis

## 2021-11-29 ENCOUNTER — HOSPITAL ENCOUNTER (OUTPATIENT)
Dept: RADIATION ONCOLOGY | Facility: MEDICAL CENTER | Age: 81
End: 2021-11-29
Payer: COMMERCIAL

## 2021-11-29 LAB
CHEMOTHERAPY INFUSION START DATE: NORMAL
CHEMOTHERAPY RECORDS: 2.5
CHEMOTHERAPY RECORDS: 6250
CHEMOTHERAPY RECORDS: NORMAL
CHEMOTHERAPY RX CANCER: NORMAL
DATE 1ST CHEMO CANCER: NORMAL
RAD ONC ARIA COURSE LAST TREATMENT DATE: NORMAL
RAD ONC ARIA COURSE TREATMENT ELAPSED DAYS: NORMAL
RAD ONC ARIA REFERENCE POINT DOSAGE GIVEN TO DATE: 20
RAD ONC ARIA REFERENCE POINT DOSAGE GIVEN TO DATE: 20.95
RAD ONC ARIA REFERENCE POINT ID: NORMAL
RAD ONC ARIA REFERENCE POINT ID: NORMAL
RAD ONC ARIA REFERENCE POINT SESSION DOSAGE GIVEN: 2.5
RAD ONC ARIA REFERENCE POINT SESSION DOSAGE GIVEN: 2.62

## 2021-11-29 PROCEDURE — 77336 RADIATION PHYSICS CONSULT: CPT | Performed by: RADIOLOGY

## 2021-11-29 PROCEDURE — 77385 HCHG IMRT DELIVERY SIMPLE: CPT | Performed by: RADIOLOGY

## 2021-11-29 PROCEDURE — 77014 PR CT GUIDANCE PLACEMENT RAD THERAPY FIELDS: CPT | Mod: 26 | Performed by: RADIOLOGY

## 2021-11-30 ENCOUNTER — HOSPITAL ENCOUNTER (OUTPATIENT)
Dept: RADIATION ONCOLOGY | Facility: MEDICAL CENTER | Age: 81
End: 2021-11-30
Payer: COMMERCIAL

## 2021-11-30 LAB
CHEMOTHERAPY INFUSION START DATE: NORMAL
CHEMOTHERAPY RECORDS: 2.5
CHEMOTHERAPY RECORDS: 6250
CHEMOTHERAPY RECORDS: NORMAL
CHEMOTHERAPY RX CANCER: NORMAL
DATE 1ST CHEMO CANCER: NORMAL
RAD ONC ARIA COURSE LAST TREATMENT DATE: NORMAL
RAD ONC ARIA COURSE TREATMENT ELAPSED DAYS: NORMAL
RAD ONC ARIA REFERENCE POINT DOSAGE GIVEN TO DATE: 22.5
RAD ONC ARIA REFERENCE POINT DOSAGE GIVEN TO DATE: 23.57
RAD ONC ARIA REFERENCE POINT ID: NORMAL
RAD ONC ARIA REFERENCE POINT ID: NORMAL
RAD ONC ARIA REFERENCE POINT SESSION DOSAGE GIVEN: 2.5
RAD ONC ARIA REFERENCE POINT SESSION DOSAGE GIVEN: 2.62

## 2021-11-30 PROCEDURE — 77014 PR CT GUIDANCE PLACEMENT RAD THERAPY FIELDS: CPT | Mod: 26 | Performed by: RADIOLOGY

## 2021-11-30 PROCEDURE — 77385 HCHG IMRT DELIVERY SIMPLE: CPT | Performed by: RADIOLOGY

## 2021-12-01 ENCOUNTER — HOSPITAL ENCOUNTER (OUTPATIENT)
Dept: RADIATION ONCOLOGY | Facility: MEDICAL CENTER | Age: 81
End: 2021-12-31
Attending: RADIOLOGY
Payer: COMMERCIAL

## 2021-12-01 ENCOUNTER — HOSPITAL ENCOUNTER (OUTPATIENT)
Dept: RADIATION ONCOLOGY | Facility: MEDICAL CENTER | Age: 81
End: 2021-12-01

## 2021-12-01 VITALS
HEART RATE: 70 BPM | DIASTOLIC BLOOD PRESSURE: 79 MMHG | SYSTOLIC BLOOD PRESSURE: 138 MMHG | TEMPERATURE: 97.5 F | OXYGEN SATURATION: 94 %

## 2021-12-01 LAB
CHEMOTHERAPY INFUSION START DATE: NORMAL
CHEMOTHERAPY RECORDS: 2.5
CHEMOTHERAPY RECORDS: 6250
CHEMOTHERAPY RECORDS: NORMAL
CHEMOTHERAPY RX CANCER: NORMAL
DATE 1ST CHEMO CANCER: NORMAL
RAD ONC ARIA COURSE LAST TREATMENT DATE: NORMAL
RAD ONC ARIA COURSE TREATMENT ELAPSED DAYS: NORMAL
RAD ONC ARIA REFERENCE POINT DOSAGE GIVEN TO DATE: 25
RAD ONC ARIA REFERENCE POINT DOSAGE GIVEN TO DATE: 26.19
RAD ONC ARIA REFERENCE POINT ID: NORMAL
RAD ONC ARIA REFERENCE POINT ID: NORMAL
RAD ONC ARIA REFERENCE POINT SESSION DOSAGE GIVEN: 2.5
RAD ONC ARIA REFERENCE POINT SESSION DOSAGE GIVEN: 2.62

## 2021-12-01 PROCEDURE — 77014 PR CT GUIDANCE PLACEMENT RAD THERAPY FIELDS: CPT | Mod: 26 | Performed by: RADIOLOGY

## 2021-12-01 PROCEDURE — 77385 HCHG IMRT DELIVERY SIMPLE: CPT | Performed by: RADIOLOGY

## 2021-12-01 PROCEDURE — 77427 RADIATION TX MANAGEMENT X5: CPT | Performed by: RADIOLOGY

## 2021-12-01 ASSESSMENT — PAIN SCALES - GENERAL: PAINLEVEL: NO PAIN

## 2021-12-01 NOTE — ON TREATMENT VISIT
ON TREATMENT NOTE  RADIATION ONCOLOGY DEPARTMENT    Patient name:  Bg Andrea    Primary Physician:  No primary care provider on file. MRN: [id number]  CSN: [id number]   Referring physician:  No ref. provider found : 1940, 81 y.o.     ENCOUNTER DATE:  21    DIAGNOSIS:    No matching staging information was found for the patient.    TREATMENT SUMMARY:  Radiation Treatments     Active   Plans   R_Breast   Most recent treatment: Dose planned: 250 cGy (fraction 10 of 25 on 2021)   Total: Dose planned: 6,250 cGy   Elapsed Days: 14 @ [id number]      Reference Points   R_Breast   Most recent treatment: Dose given: 250 cGy (on 2021)   Total: Dose given: 2,500 cGy   Elapsed Days: 14 @ [id number]      R_Breast CP   Most recent treatment: Dose given: 262 cGy (on 2021)   Total: Dose given: 2,619 cGy   Elapsed Days: 14 @ [id number]                    SUBJECTIVE:   Tolerating therapy without event.      VITAL SIGNS:  /79   Pulse 70   Temp 36.4 °C (97.5 °F)   SpO2 94%    Encounter Vitals  Temperature: 36.4 °C (97.5 °F)  Blood Pressure : 138/79  Pulse: 70  Pulse Oximetry: 94 %  Pain Score: No pain  Pain Assessment 2021 2021 2021 10/22/2021   Pain Score 0 0 0 0   Some recent data might be hidden          PHYSICAL EXAM:  Slight erythema in the treatment field.  The tumor appears to be slightly smaller      Toxicity Assessment 2021   Toxicity Assessment Breast Breast Breast   Fatigue (lethargy, malaise, asthenia) None None None   Fever (in the absence of neutropenia) None None None   Radiation Dermatitis None None None   Lymphatics Normal Normal Normal   RT - Pain due to RT None None None   Dyspnea Normal Normal Normal         IMPRESSION:  Cancer Staging  No matching staging information was found for the patient.    PLAN:  No change in treatment plan    Disposition:  Treatment plan reviewed. Questions answered. Continue therapy outlined.      Chaparrita Sorenson M.D.    No orders of the defined types were placed in this encounter.

## 2021-12-02 ENCOUNTER — HOSPITAL ENCOUNTER (OUTPATIENT)
Dept: RADIATION ONCOLOGY | Facility: MEDICAL CENTER | Age: 81
End: 2021-12-02
Payer: COMMERCIAL

## 2021-12-02 LAB
CHEMOTHERAPY INFUSION START DATE: NORMAL
CHEMOTHERAPY RECORDS: 2.5
CHEMOTHERAPY RECORDS: 6250
CHEMOTHERAPY RECORDS: NORMAL
CHEMOTHERAPY RX CANCER: NORMAL
DATE 1ST CHEMO CANCER: NORMAL
RAD ONC ARIA COURSE LAST TREATMENT DATE: NORMAL
RAD ONC ARIA COURSE TREATMENT ELAPSED DAYS: NORMAL
RAD ONC ARIA REFERENCE POINT DOSAGE GIVEN TO DATE: 27.5
RAD ONC ARIA REFERENCE POINT DOSAGE GIVEN TO DATE: 28.81
RAD ONC ARIA REFERENCE POINT ID: NORMAL
RAD ONC ARIA REFERENCE POINT ID: NORMAL
RAD ONC ARIA REFERENCE POINT SESSION DOSAGE GIVEN: 2.5
RAD ONC ARIA REFERENCE POINT SESSION DOSAGE GIVEN: 2.62

## 2021-12-02 PROCEDURE — 77385 HCHG IMRT DELIVERY SIMPLE: CPT | Performed by: RADIOLOGY

## 2021-12-02 PROCEDURE — 77014 PR CT GUIDANCE PLACEMENT RAD THERAPY FIELDS: CPT | Mod: 26 | Performed by: RADIOLOGY

## 2021-12-03 ENCOUNTER — HOSPITAL ENCOUNTER (OUTPATIENT)
Dept: RADIATION ONCOLOGY | Facility: MEDICAL CENTER | Age: 81
End: 2021-12-03
Payer: COMMERCIAL

## 2021-12-03 LAB
CHEMOTHERAPY INFUSION START DATE: NORMAL
CHEMOTHERAPY RECORDS: 2.5
CHEMOTHERAPY RECORDS: 6250
CHEMOTHERAPY RECORDS: NORMAL
CHEMOTHERAPY RX CANCER: NORMAL
DATE 1ST CHEMO CANCER: NORMAL
RAD ONC ARIA COURSE LAST TREATMENT DATE: NORMAL
RAD ONC ARIA COURSE TREATMENT ELAPSED DAYS: NORMAL
RAD ONC ARIA REFERENCE POINT DOSAGE GIVEN TO DATE: 30
RAD ONC ARIA REFERENCE POINT DOSAGE GIVEN TO DATE: 31.42
RAD ONC ARIA REFERENCE POINT ID: NORMAL
RAD ONC ARIA REFERENCE POINT ID: NORMAL
RAD ONC ARIA REFERENCE POINT SESSION DOSAGE GIVEN: 2.5
RAD ONC ARIA REFERENCE POINT SESSION DOSAGE GIVEN: 2.62

## 2021-12-03 PROCEDURE — 77385 HCHG IMRT DELIVERY SIMPLE: CPT | Performed by: RADIOLOGY

## 2021-12-03 PROCEDURE — 77014 PR CT GUIDANCE PLACEMENT RAD THERAPY FIELDS: CPT | Mod: 26 | Performed by: RADIOLOGY

## 2021-12-06 ENCOUNTER — HOSPITAL ENCOUNTER (OUTPATIENT)
Dept: RADIATION ONCOLOGY | Facility: MEDICAL CENTER | Age: 81
End: 2021-12-06
Payer: COMMERCIAL

## 2021-12-06 LAB
CHEMOTHERAPY INFUSION START DATE: NORMAL
CHEMOTHERAPY RECORDS: 2.5
CHEMOTHERAPY RECORDS: 6250
CHEMOTHERAPY RECORDS: NORMAL
CHEMOTHERAPY RX CANCER: NORMAL
DATE 1ST CHEMO CANCER: NORMAL
RAD ONC ARIA COURSE LAST TREATMENT DATE: NORMAL
RAD ONC ARIA COURSE TREATMENT ELAPSED DAYS: NORMAL
RAD ONC ARIA REFERENCE POINT DOSAGE GIVEN TO DATE: 32.5
RAD ONC ARIA REFERENCE POINT DOSAGE GIVEN TO DATE: 34.04
RAD ONC ARIA REFERENCE POINT ID: NORMAL
RAD ONC ARIA REFERENCE POINT ID: NORMAL
RAD ONC ARIA REFERENCE POINT SESSION DOSAGE GIVEN: 2.5
RAD ONC ARIA REFERENCE POINT SESSION DOSAGE GIVEN: 2.62

## 2021-12-06 PROCEDURE — 77336 RADIATION PHYSICS CONSULT: CPT | Performed by: RADIOLOGY

## 2021-12-06 PROCEDURE — 77385 HCHG IMRT DELIVERY SIMPLE: CPT | Performed by: RADIOLOGY

## 2021-12-06 PROCEDURE — 77014 PR CT GUIDANCE PLACEMENT RAD THERAPY FIELDS: CPT | Mod: 26 | Performed by: RADIOLOGY

## 2021-12-07 ENCOUNTER — HOSPITAL ENCOUNTER (OUTPATIENT)
Dept: RADIATION ONCOLOGY | Facility: MEDICAL CENTER | Age: 81
End: 2021-12-07
Payer: COMMERCIAL

## 2021-12-07 LAB
CHEMOTHERAPY INFUSION START DATE: NORMAL
CHEMOTHERAPY RECORDS: 2.5
CHEMOTHERAPY RECORDS: 6250
CHEMOTHERAPY RECORDS: NORMAL
CHEMOTHERAPY RX CANCER: NORMAL
DATE 1ST CHEMO CANCER: NORMAL
RAD ONC ARIA COURSE LAST TREATMENT DATE: NORMAL
RAD ONC ARIA COURSE TREATMENT ELAPSED DAYS: NORMAL
RAD ONC ARIA REFERENCE POINT DOSAGE GIVEN TO DATE: 35
RAD ONC ARIA REFERENCE POINT DOSAGE GIVEN TO DATE: 36.66
RAD ONC ARIA REFERENCE POINT ID: NORMAL
RAD ONC ARIA REFERENCE POINT ID: NORMAL
RAD ONC ARIA REFERENCE POINT SESSION DOSAGE GIVEN: 2.5
RAD ONC ARIA REFERENCE POINT SESSION DOSAGE GIVEN: 2.62

## 2021-12-07 PROCEDURE — 77014 PR CT GUIDANCE PLACEMENT RAD THERAPY FIELDS: CPT | Mod: 26 | Performed by: RADIOLOGY

## 2021-12-07 PROCEDURE — 77385 HCHG IMRT DELIVERY SIMPLE: CPT | Performed by: RADIOLOGY

## 2021-12-08 ENCOUNTER — PATIENT OUTREACH (OUTPATIENT)
Dept: OTHER | Facility: MEDICAL CENTER | Age: 81
End: 2021-12-08

## 2021-12-08 ENCOUNTER — HOSPITAL ENCOUNTER (OUTPATIENT)
Dept: RADIATION ONCOLOGY | Facility: MEDICAL CENTER | Age: 81
End: 2021-12-08
Payer: COMMERCIAL

## 2021-12-08 VITALS
OXYGEN SATURATION: 97 % | SYSTOLIC BLOOD PRESSURE: 143 MMHG | TEMPERATURE: 97.7 F | HEART RATE: 76 BPM | DIASTOLIC BLOOD PRESSURE: 74 MMHG

## 2021-12-08 LAB
CHEMOTHERAPY INFUSION START DATE: NORMAL
CHEMOTHERAPY RECORDS: 2.5
CHEMOTHERAPY RECORDS: 6250
CHEMOTHERAPY RECORDS: NORMAL
CHEMOTHERAPY RX CANCER: NORMAL
DATE 1ST CHEMO CANCER: NORMAL
RAD ONC ARIA COURSE LAST TREATMENT DATE: NORMAL
RAD ONC ARIA COURSE TREATMENT ELAPSED DAYS: NORMAL
RAD ONC ARIA REFERENCE POINT DOSAGE GIVEN TO DATE: 37.5
RAD ONC ARIA REFERENCE POINT DOSAGE GIVEN TO DATE: 39.28
RAD ONC ARIA REFERENCE POINT ID: NORMAL
RAD ONC ARIA REFERENCE POINT ID: NORMAL
RAD ONC ARIA REFERENCE POINT SESSION DOSAGE GIVEN: 2.5
RAD ONC ARIA REFERENCE POINT SESSION DOSAGE GIVEN: 2.62

## 2021-12-08 PROCEDURE — 77385 HCHG IMRT DELIVERY SIMPLE: CPT | Performed by: RADIOLOGY

## 2021-12-08 PROCEDURE — 77014 PR CT GUIDANCE PLACEMENT RAD THERAPY FIELDS: CPT | Mod: 26 | Performed by: RADIOLOGY

## 2021-12-08 PROCEDURE — 77427 RADIATION TX MANAGEMENT X5: CPT | Performed by: RADIOLOGY

## 2021-12-08 ASSESSMENT — PAIN SCALES - GENERAL: PAINLEVEL: NO PAIN

## 2021-12-08 NOTE — ON TREATMENT VISIT
ON TREATMENT NOTE  RADIATION ONCOLOGY DEPARTMENT    Patient name:  Bg Andrea    Primary Physician:  No primary care provider on file. MRN: [id number]  CSN: [id number]   Referring physician:  Marina Gregory M.D. : 1940, 81 y.o.     ENCOUNTER DATE:  21    DIAGNOSIS:    No matching staging information was found for the patient.    TREATMENT SUMMARY:  Radiation Treatments     Active   Plans   R_Breast   Most recent treatment: Dose planned: 250 cGy (fraction 15 of 25 on 2021)   Total: Dose planned: 6,250 cGy   Elapsed Days:  @ [id number]      Reference Points   R_Breast   Most recent treatment: Dose given: 250 cGy (on 2021)   Total: Dose given: 3,750 cGy   Elapsed Days:  @ [id number]      R_Breast CP   Most recent treatment: Dose given: 262 cGy (on 2021)   Total: Dose given: 3,928 cGy   Elapsed Days:  @ [id number]                    SUBJECTIVE:   Tolerating therapy without major event.  He does have fatigue.      VITAL SIGNS:  /74   Pulse 76   Temp 36.5 °C (97.7 °F)   SpO2 97%    Encounter Vitals  Temperature: 36.5 °C (97.7 °F)  Blood Pressure : 143/74  Pulse: 76  Pulse Oximetry: 97 %  Pain Score: No pain  Pain Assessment 2021 2021 2021 2021 10/22/2021   Pain Score 0 0 0 0 0   Some recent data might be hidden          PHYSICAL EXAM:  Brisk erythematous reaction in the field  Moderate erythema in the treatment field    Toxicity Assessment 2021   Toxicity Assessment Breast Breast Breast Breast   Fatigue (lethargy, malaise, asthenia) None None None None   Fever (in the absence of neutropenia) None None None None   Radiation Dermatitis None None None None   Lymphatics Normal Normal Normal Normal   RT - Pain due to RT None None None None   Dyspnea Normal Normal Normal Normal         IMPRESSION:  Cancer Staging  No matching staging information was found for the patient.    PLAN:  No change in treatment  plan    Disposition:  Treatment plan reviewed. Questions answered. Continue therapy outlined.     Chaparrita Sorenson M.D.    No orders of the defined types were placed in this encounter.

## 2021-12-08 NOTE — PROGRESS NOTES
On December 8, 2021, Oncology Social Worker Suha Cunningham contacted pt.'s wife Mandi.  Mandi shared they were in need of RTC Access tickets.  OSW Octavio informed Mandi a booklet would be available for  at the Radiation Oncology  tomorrow from [facility].  Mandi shared she would notify pt. to pick them up from the  tomorrow.  OSW Octavio informed pt.'s wife, Mandi, [facility] will order several booklets for pt.  Mandi expressed gratitude as

## 2021-12-09 ENCOUNTER — HOSPITAL ENCOUNTER (OUTPATIENT)
Dept: RADIATION ONCOLOGY | Facility: MEDICAL CENTER | Age: 81
End: 2021-12-09
Payer: COMMERCIAL

## 2021-12-09 LAB
CHEMOTHERAPY INFUSION START DATE: NORMAL
CHEMOTHERAPY RECORDS: 2.5
CHEMOTHERAPY RECORDS: 6250
CHEMOTHERAPY RECORDS: NORMAL
CHEMOTHERAPY RX CANCER: NORMAL
DATE 1ST CHEMO CANCER: NORMAL
RAD ONC ARIA COURSE LAST TREATMENT DATE: NORMAL
RAD ONC ARIA COURSE TREATMENT ELAPSED DAYS: NORMAL
RAD ONC ARIA REFERENCE POINT DOSAGE GIVEN TO DATE: 40
RAD ONC ARIA REFERENCE POINT DOSAGE GIVEN TO DATE: 41.9
RAD ONC ARIA REFERENCE POINT ID: NORMAL
RAD ONC ARIA REFERENCE POINT ID: NORMAL
RAD ONC ARIA REFERENCE POINT SESSION DOSAGE GIVEN: 2.5
RAD ONC ARIA REFERENCE POINT SESSION DOSAGE GIVEN: 2.62

## 2021-12-09 PROCEDURE — 77014 PR CT GUIDANCE PLACEMENT RAD THERAPY FIELDS: CPT | Mod: 26 | Performed by: RADIOLOGY

## 2021-12-09 PROCEDURE — 77385 HCHG IMRT DELIVERY SIMPLE: CPT | Performed by: RADIOLOGY

## 2021-12-10 ENCOUNTER — HOSPITAL ENCOUNTER (OUTPATIENT)
Dept: RADIATION ONCOLOGY | Facility: MEDICAL CENTER | Age: 81
End: 2021-12-10
Payer: COMMERCIAL

## 2021-12-10 LAB
CHEMOTHERAPY INFUSION START DATE: NORMAL
CHEMOTHERAPY RECORDS: 2.5
CHEMOTHERAPY RECORDS: 6250
CHEMOTHERAPY RECORDS: NORMAL
CHEMOTHERAPY RX CANCER: NORMAL
DATE 1ST CHEMO CANCER: NORMAL
RAD ONC ARIA COURSE LAST TREATMENT DATE: NORMAL
RAD ONC ARIA COURSE TREATMENT ELAPSED DAYS: NORMAL
RAD ONC ARIA REFERENCE POINT DOSAGE GIVEN TO DATE: 42.5
RAD ONC ARIA REFERENCE POINT DOSAGE GIVEN TO DATE: 44.52
RAD ONC ARIA REFERENCE POINT ID: NORMAL
RAD ONC ARIA REFERENCE POINT ID: NORMAL
RAD ONC ARIA REFERENCE POINT SESSION DOSAGE GIVEN: 2.5
RAD ONC ARIA REFERENCE POINT SESSION DOSAGE GIVEN: 2.62

## 2021-12-10 PROCEDURE — 77014 PR CT GUIDANCE PLACEMENT RAD THERAPY FIELDS: CPT | Mod: 26 | Performed by: RADIOLOGY

## 2021-12-10 PROCEDURE — 77385 HCHG IMRT DELIVERY SIMPLE: CPT | Performed by: RADIOLOGY

## 2021-12-13 ENCOUNTER — HOSPITAL ENCOUNTER (OUTPATIENT)
Dept: RADIATION ONCOLOGY | Facility: MEDICAL CENTER | Age: 81
End: 2021-12-13
Payer: COMMERCIAL

## 2021-12-13 LAB
CHEMOTHERAPY INFUSION START DATE: NORMAL
CHEMOTHERAPY RECORDS: 2.5
CHEMOTHERAPY RECORDS: 6250
CHEMOTHERAPY RECORDS: NORMAL
CHEMOTHERAPY RX CANCER: NORMAL
DATE 1ST CHEMO CANCER: NORMAL
RAD ONC ARIA COURSE LAST TREATMENT DATE: NORMAL
RAD ONC ARIA COURSE TREATMENT ELAPSED DAYS: NORMAL
RAD ONC ARIA REFERENCE POINT DOSAGE GIVEN TO DATE: 45
RAD ONC ARIA REFERENCE POINT DOSAGE GIVEN TO DATE: 47.14
RAD ONC ARIA REFERENCE POINT ID: NORMAL
RAD ONC ARIA REFERENCE POINT ID: NORMAL
RAD ONC ARIA REFERENCE POINT SESSION DOSAGE GIVEN: 2.5
RAD ONC ARIA REFERENCE POINT SESSION DOSAGE GIVEN: 2.62

## 2021-12-13 PROCEDURE — 77385 HCHG IMRT DELIVERY SIMPLE: CPT | Performed by: RADIOLOGY

## 2021-12-13 PROCEDURE — 77014 PR CT GUIDANCE PLACEMENT RAD THERAPY FIELDS: CPT | Mod: 26 | Performed by: RADIOLOGY

## 2021-12-13 PROCEDURE — 77336 RADIATION PHYSICS CONSULT: CPT | Performed by: RADIOLOGY

## 2021-12-14 ENCOUNTER — HOSPITAL ENCOUNTER (OUTPATIENT)
Dept: RADIATION ONCOLOGY | Facility: MEDICAL CENTER | Age: 81
End: 2021-12-14
Payer: COMMERCIAL

## 2021-12-14 LAB
CHEMOTHERAPY INFUSION START DATE: NORMAL
CHEMOTHERAPY RECORDS: 2.5
CHEMOTHERAPY RECORDS: 6250
CHEMOTHERAPY RECORDS: NORMAL
CHEMOTHERAPY RX CANCER: NORMAL
DATE 1ST CHEMO CANCER: NORMAL
RAD ONC ARIA COURSE LAST TREATMENT DATE: NORMAL
RAD ONC ARIA COURSE TREATMENT ELAPSED DAYS: NORMAL
RAD ONC ARIA REFERENCE POINT DOSAGE GIVEN TO DATE: 47.5
RAD ONC ARIA REFERENCE POINT DOSAGE GIVEN TO DATE: 49.75
RAD ONC ARIA REFERENCE POINT ID: NORMAL
RAD ONC ARIA REFERENCE POINT ID: NORMAL
RAD ONC ARIA REFERENCE POINT SESSION DOSAGE GIVEN: 2.5
RAD ONC ARIA REFERENCE POINT SESSION DOSAGE GIVEN: 2.62

## 2021-12-14 PROCEDURE — 77385 HCHG IMRT DELIVERY SIMPLE: CPT | Performed by: RADIOLOGY

## 2021-12-14 PROCEDURE — 77014 PR CT GUIDANCE PLACEMENT RAD THERAPY FIELDS: CPT | Mod: 26 | Performed by: RADIOLOGY

## 2021-12-15 ENCOUNTER — HOSPITAL ENCOUNTER (OUTPATIENT)
Dept: RADIATION ONCOLOGY | Facility: MEDICAL CENTER | Age: 81
End: 2021-12-15
Payer: COMMERCIAL

## 2021-12-15 VITALS
DIASTOLIC BLOOD PRESSURE: 69 MMHG | HEART RATE: 78 BPM | TEMPERATURE: 97.8 F | SYSTOLIC BLOOD PRESSURE: 127 MMHG | WEIGHT: 152 LBS | OXYGEN SATURATION: 95 % | BODY MASS INDEX: 23.81 KG/M2

## 2021-12-15 PROBLEM — C50.021: Status: ACTIVE | Noted: 2021-11-09

## 2021-12-15 PROBLEM — Z17.0: Status: ACTIVE | Noted: 2021-11-09

## 2021-12-15 LAB
CHEMOTHERAPY INFUSION START DATE: NORMAL
CHEMOTHERAPY RECORDS: 2.5
CHEMOTHERAPY RECORDS: 6250
CHEMOTHERAPY RECORDS: NORMAL
CHEMOTHERAPY RX CANCER: NORMAL
DATE 1ST CHEMO CANCER: NORMAL
RAD ONC ARIA COURSE LAST TREATMENT DATE: NORMAL
RAD ONC ARIA COURSE TREATMENT ELAPSED DAYS: NORMAL
RAD ONC ARIA REFERENCE POINT DOSAGE GIVEN TO DATE: 50
RAD ONC ARIA REFERENCE POINT DOSAGE GIVEN TO DATE: 52.37
RAD ONC ARIA REFERENCE POINT ID: NORMAL
RAD ONC ARIA REFERENCE POINT ID: NORMAL
RAD ONC ARIA REFERENCE POINT SESSION DOSAGE GIVEN: 2.5
RAD ONC ARIA REFERENCE POINT SESSION DOSAGE GIVEN: 2.62

## 2021-12-15 PROCEDURE — 77014 PR CT GUIDANCE PLACEMENT RAD THERAPY FIELDS: CPT | Mod: 26 | Performed by: RADIOLOGY

## 2021-12-15 PROCEDURE — 77385 HCHG IMRT DELIVERY SIMPLE: CPT | Performed by: RADIOLOGY

## 2021-12-15 PROCEDURE — 77427 RADIATION TX MANAGEMENT X5: CPT | Performed by: RADIOLOGY

## 2021-12-15 ASSESSMENT — FIBROSIS 4 INDEX: FIB4 SCORE: 0.69

## 2021-12-15 ASSESSMENT — PAIN SCALES - GENERAL: PAINLEVEL: 7=MODERATE-SEVERE PAIN

## 2021-12-15 NOTE — ON TREATMENT VISIT
ON TREATMENT NOTE  RADIATION ONCOLOGY DEPARTMENT    Patient name:  Bg Andrea    Primary Physician:  No primary care provider on file. MRN: [id number]  CSN: [id number]   Referring physician:  Marina Gregory M.D. : 1940, 81 y.o.     ENCOUNTER DATE:  12/15/21    DIAGNOSIS:    No matching staging information was found for the patient.    TREATMENT SUMMARY:  Radiation Treatments     Active   Plans   R_Breast   Most recent treatment: Dose planned: 250 cGy (fraction 20 of 25 on 12/15/2021)   Total: Dose planned: 6,250 cGy   Elapsed Days: 28 @ [id number]      Reference Points   R_Breast   Most recent treatment: Dose given: 250 cGy (on 12/15/2021)   Total: Dose given: 5,000 cGy   Elapsed Days: 28 @ [id number]      R_Breast CP   Most recent treatment: Dose given: 262 cGy (on 12/15/2021)   Total: Dose given: 5,237 cGy   Elapsed Days: 28 @ [id number]                    SUBJECTIVE:   Extreme tenderness in the nipple for which she went to the emergency room and given Percocet.      VITAL SIGNS:  /69 (BP Location: Right arm, Patient Position: Sitting, BP Cuff Size: Adult)   Pulse 78   Temp 36.6 °C (97.8 °F) (Temporal)   Wt 68.9 kg (152 lb)   SpO2 95%    Encounter Vitals  Temperature: 36.6 °C (97.8 °F)  Temp src: Temporal  Blood Pressure : 127/69  Pulse: 78  Pulse Oximetry: 95 %  Weight: 68.9 kg (152 lb)  Pain Score: 7=Moderate-Severe Pain  Pain Assessment 12/15/2021 2021 2021 2021 2021   Pain Score 7 0 0 0 0   Pain Loc Chest - - - -   Some recent data might be hidden          PHYSICAL EXAM:  Moist desquamation on the nipple brisk erythematous reaction in the rest of the field.      Toxicity Assessment 12/15/2021 2021 2021 2021 2021   Toxicity Assessment Breast Breast Breast Breast Breast   Fatigue (lethargy, malaise, asthenia) Moderate (e.g., decrease in performance status by 1 ECOG level or 20% Karnofsky) or causing difficulty performing some activities  None None None None   Fever (in the absence of neutropenia) None None None None None   Radiation Dermatitis Faint erythema or dry desquamation None None None None   Lymphatics Normal Normal Normal Normal Normal   RT - Pain due to RT Severe pain, pain or analgesics severely interfering with activities of daily living None None None None   Dyspnea Normal Normal Normal Normal Normal         IMPRESSION:  Cancer Staging  No matching staging information was found for the patient.    PLAN:  No change in treatment plan .  He was given Mepitel on the nipple and extra in case those fall off.He did have instant relief with the Mepitel placed on the nipple.    Disposition:  Treatment plan reviewed. Questions answered. Continue therapy outlined.     Chaparrita Sorenson M.D.    No orders of the defined types were placed in this encounter.

## 2021-12-16 ENCOUNTER — HOSPITAL ENCOUNTER (OUTPATIENT)
Dept: RADIATION ONCOLOGY | Facility: MEDICAL CENTER | Age: 81
End: 2021-12-16
Payer: COMMERCIAL

## 2021-12-16 LAB
CHEMOTHERAPY INFUSION START DATE: NORMAL
CHEMOTHERAPY RECORDS: 2.5
CHEMOTHERAPY RECORDS: 6250
CHEMOTHERAPY RECORDS: NORMAL
CHEMOTHERAPY RX CANCER: NORMAL
DATE 1ST CHEMO CANCER: NORMAL
RAD ONC ARIA COURSE LAST TREATMENT DATE: NORMAL
RAD ONC ARIA COURSE TREATMENT ELAPSED DAYS: NORMAL
RAD ONC ARIA REFERENCE POINT DOSAGE GIVEN TO DATE: 52.5
RAD ONC ARIA REFERENCE POINT DOSAGE GIVEN TO DATE: 54.99
RAD ONC ARIA REFERENCE POINT ID: NORMAL
RAD ONC ARIA REFERENCE POINT ID: NORMAL
RAD ONC ARIA REFERENCE POINT SESSION DOSAGE GIVEN: 2.5
RAD ONC ARIA REFERENCE POINT SESSION DOSAGE GIVEN: 2.62

## 2021-12-16 PROCEDURE — 77385 HCHG IMRT DELIVERY SIMPLE: CPT | Performed by: RADIOLOGY

## 2021-12-16 PROCEDURE — 77014 PR CT GUIDANCE PLACEMENT RAD THERAPY FIELDS: CPT | Mod: 26 | Performed by: RADIOLOGY

## 2021-12-17 ENCOUNTER — HOSPITAL ENCOUNTER (OUTPATIENT)
Dept: RADIATION ONCOLOGY | Facility: MEDICAL CENTER | Age: 81
End: 2021-12-17
Payer: COMMERCIAL

## 2021-12-17 LAB
CHEMOTHERAPY INFUSION START DATE: NORMAL
CHEMOTHERAPY RECORDS: 2.5
CHEMOTHERAPY RECORDS: 6250
CHEMOTHERAPY RECORDS: NORMAL
CHEMOTHERAPY RX CANCER: NORMAL
DATE 1ST CHEMO CANCER: NORMAL
RAD ONC ARIA COURSE LAST TREATMENT DATE: NORMAL
RAD ONC ARIA COURSE TREATMENT ELAPSED DAYS: NORMAL
RAD ONC ARIA REFERENCE POINT DOSAGE GIVEN TO DATE: 55
RAD ONC ARIA REFERENCE POINT DOSAGE GIVEN TO DATE: 57.61
RAD ONC ARIA REFERENCE POINT ID: NORMAL
RAD ONC ARIA REFERENCE POINT ID: NORMAL
RAD ONC ARIA REFERENCE POINT SESSION DOSAGE GIVEN: 2.5
RAD ONC ARIA REFERENCE POINT SESSION DOSAGE GIVEN: 2.62

## 2021-12-17 PROCEDURE — 77014 PR CT GUIDANCE PLACEMENT RAD THERAPY FIELDS: CPT | Mod: 26 | Performed by: RADIOLOGY

## 2021-12-17 PROCEDURE — 77385 HCHG IMRT DELIVERY SIMPLE: CPT | Performed by: RADIOLOGY

## 2021-12-17 PROCEDURE — 77300 RADIATION THERAPY DOSE PLAN: CPT | Mod: 26 | Performed by: RADIOLOGY

## 2021-12-17 PROCEDURE — 77300 RADIATION THERAPY DOSE PLAN: CPT | Performed by: RADIOLOGY

## 2021-12-17 PROCEDURE — 77338 DESIGN MLC DEVICE FOR IMRT: CPT | Performed by: RADIOLOGY

## 2021-12-17 PROCEDURE — 77333 RADIATION TREATMENT AID(S): CPT | Mod: XU | Performed by: RADIOLOGY

## 2021-12-17 PROCEDURE — 77338 DESIGN MLC DEVICE FOR IMRT: CPT | Mod: 26 | Performed by: RADIOLOGY

## 2021-12-17 PROCEDURE — 77333 RADIATION TREATMENT AID(S): CPT | Mod: 26,XU | Performed by: RADIOLOGY

## 2021-12-20 ENCOUNTER — HOSPITAL ENCOUNTER (OUTPATIENT)
Dept: RADIATION ONCOLOGY | Facility: MEDICAL CENTER | Age: 81
End: 2021-12-20
Payer: COMMERCIAL

## 2021-12-20 LAB
CHEMOTHERAPY INFUSION START DATE: NORMAL
CHEMOTHERAPY RECORDS: 2.5
CHEMOTHERAPY RECORDS: 6250
CHEMOTHERAPY RECORDS: NORMAL
CHEMOTHERAPY RX CANCER: NORMAL
DATE 1ST CHEMO CANCER: NORMAL
RAD ONC ARIA COURSE LAST TREATMENT DATE: NORMAL
RAD ONC ARIA COURSE TREATMENT ELAPSED DAYS: NORMAL
RAD ONC ARIA REFERENCE POINT DOSAGE GIVEN TO DATE: 57.5
RAD ONC ARIA REFERENCE POINT DOSAGE GIVEN TO DATE: 60.23
RAD ONC ARIA REFERENCE POINT ID: NORMAL
RAD ONC ARIA REFERENCE POINT ID: NORMAL
RAD ONC ARIA REFERENCE POINT SESSION DOSAGE GIVEN: 2.5
RAD ONC ARIA REFERENCE POINT SESSION DOSAGE GIVEN: 2.62

## 2021-12-20 PROCEDURE — 77014 PR CT GUIDANCE PLACEMENT RAD THERAPY FIELDS: CPT | Mod: 26 | Performed by: RADIOLOGY

## 2021-12-20 PROCEDURE — 77336 RADIATION PHYSICS CONSULT: CPT | Performed by: RADIOLOGY

## 2021-12-20 PROCEDURE — 77385 HCHG IMRT DELIVERY SIMPLE: CPT | Performed by: RADIOLOGY

## 2021-12-21 ENCOUNTER — HOSPITAL ENCOUNTER (OUTPATIENT)
Dept: RADIATION ONCOLOGY | Facility: MEDICAL CENTER | Age: 81
End: 2021-12-21

## 2021-12-21 LAB
CHEMOTHERAPY INFUSION START DATE: NORMAL
CHEMOTHERAPY RECORDS: 2.5
CHEMOTHERAPY RECORDS: 6250
CHEMOTHERAPY RECORDS: NORMAL
CHEMOTHERAPY RX CANCER: NORMAL
DATE 1ST CHEMO CANCER: NORMAL
RAD ONC ARIA COURSE LAST TREATMENT DATE: NORMAL
RAD ONC ARIA COURSE TREATMENT ELAPSED DAYS: NORMAL
RAD ONC ARIA REFERENCE POINT DOSAGE GIVEN TO DATE: 60
RAD ONC ARIA REFERENCE POINT DOSAGE GIVEN TO DATE: 62.85
RAD ONC ARIA REFERENCE POINT ID: NORMAL
RAD ONC ARIA REFERENCE POINT ID: NORMAL
RAD ONC ARIA REFERENCE POINT SESSION DOSAGE GIVEN: 2.5
RAD ONC ARIA REFERENCE POINT SESSION DOSAGE GIVEN: 2.62

## 2021-12-21 PROCEDURE — 77385 HCHG IMRT DELIVERY SIMPLE: CPT | Performed by: RADIOLOGY

## 2021-12-21 PROCEDURE — 77014 PR CT GUIDANCE PLACEMENT RAD THERAPY FIELDS: CPT | Mod: 26 | Performed by: RADIOLOGY

## 2021-12-22 ENCOUNTER — HOSPITAL ENCOUNTER (OUTPATIENT)
Dept: RADIATION ONCOLOGY | Facility: MEDICAL CENTER | Age: 81
End: 2021-12-22
Payer: COMMERCIAL

## 2021-12-22 VITALS
SYSTOLIC BLOOD PRESSURE: 137 MMHG | DIASTOLIC BLOOD PRESSURE: 78 MMHG | OXYGEN SATURATION: 93 % | BODY MASS INDEX: 23.18 KG/M2 | HEART RATE: 79 BPM | TEMPERATURE: 96.8 F | WEIGHT: 148 LBS

## 2021-12-22 DIAGNOSIS — Z17.0 MALIGNANT NEOPLASM INVOLVING BOTH NIPPLE AND AREOLA OF RIGHT BREAST IN MALE, ESTROGEN RECEPTOR POSITIVE (HCC): ICD-10-CM

## 2021-12-22 DIAGNOSIS — C50.021 MALIGNANT NEOPLASM INVOLVING BOTH NIPPLE AND AREOLA OF RIGHT BREAST IN MALE, ESTROGEN RECEPTOR POSITIVE (HCC): ICD-10-CM

## 2021-12-22 LAB
CHEMOTHERAPY INFUSION START DATE: NORMAL
CHEMOTHERAPY RECORDS: 2.5
CHEMOTHERAPY RECORDS: 6250
CHEMOTHERAPY RECORDS: NORMAL
CHEMOTHERAPY RX CANCER: NORMAL
DATE 1ST CHEMO CANCER: NORMAL
RAD ONC ARIA COURSE LAST TREATMENT DATE: NORMAL
RAD ONC ARIA COURSE TREATMENT ELAPSED DAYS: NORMAL
RAD ONC ARIA REFERENCE POINT DOSAGE GIVEN TO DATE: 62.5
RAD ONC ARIA REFERENCE POINT DOSAGE GIVEN TO DATE: 65.47
RAD ONC ARIA REFERENCE POINT ID: NORMAL
RAD ONC ARIA REFERENCE POINT ID: NORMAL
RAD ONC ARIA REFERENCE POINT SESSION DOSAGE GIVEN: 2.5
RAD ONC ARIA REFERENCE POINT SESSION DOSAGE GIVEN: 2.62

## 2021-12-22 PROCEDURE — 77427 RADIATION TX MANAGEMENT X5: CPT | Performed by: RADIOLOGY

## 2021-12-22 PROCEDURE — 77385 HCHG IMRT DELIVERY SIMPLE: CPT | Performed by: RADIOLOGY

## 2021-12-22 PROCEDURE — 77014 PR CT GUIDANCE PLACEMENT RAD THERAPY FIELDS: CPT | Mod: 26 | Performed by: RADIOLOGY

## 2021-12-22 RX ORDER — HYDROCODONE BITARTRATE AND ACETAMINOPHEN 5; 325 MG/1; MG/1
1 TABLET ORAL EVERY 4 HOURS PRN
Qty: 20 TABLET | Refills: 0 | Status: SHIPPED | OUTPATIENT
Start: 2021-12-22 | End: 2021-12-29

## 2021-12-22 ASSESSMENT — FIBROSIS 4 INDEX: FIB4 SCORE: 0.69

## 2021-12-22 ASSESSMENT — PAIN SCALES - GENERAL: PAINLEVEL: 10=SEVERE PAIN

## 2021-12-22 NOTE — ON TREATMENT VISIT
ON TREATMENT NOTE  RADIATION ONCOLOGY DEPARTMENT    Patient name:  Bg Andrea    Primary Physician:  No primary care provider on file. MRN: [id number]  CSN: [id number]   Referring physician:  Marina Gregory M.D. : 1940, 81 y.o.     ENCOUNTER DATE:  21    DIAGNOSIS:    Breast cancer locally advanced    TREATMENT SUMMARY:  Radiation Treatments     Active   Plans   R_Breast   Most recent treatment: Dose planned: 250 cGy (fraction 25 of 25 on 2021)   Total: Dose planned: 6,250 cGy   Elapsed Days: 35 @ [id number]      Reference Points   R_Breast   Most recent treatment: Dose given: 250 cGy (on 2021)   Total: Dose given: 6,250 cGy   Elapsed Days: 35 @ [id number]      R_Breast CP   Most recent treatment: Dose given: 262 cGy (on 2021)   Total: Dose given: 6,547 cGy   Elapsed Days: 35 @ [id number]                    SUBJECTIVE:   Brisk reaction. Sore breast Continuing to use mepital      VITAL SIGNS:  /78 (BP Location: Left arm, Patient Position: Sitting, BP Cuff Size: Adult)   Pulse 79   Temp 36 °C (96.8 °F) (Temporal)   Wt 67.1 kg (148 lb)   SpO2 93%    Encounter Vitals  Temperature: 36 °C (96.8 °F)  Temp src: Temporal  Blood Pressure : 137/78  Pulse: 79  Pulse Oximetry: 93 %  Weight: 67.1 kg (148 lb)  Pain Score: 10=Severe Pain  Pain Assessment 2021 12/15/2021 2021 2021   Pain Score 10 7 0 0   Pain Loc Breast Chest - -   Some recent data might be hidden          PHYSICAL EXAM:  Ulcerative nipple erythema  Moderate erythema in the treatment field    Toxicity Assessment 2021 12/15/2021 2021 2021 2021 2021   Toxicity Assessment Breast Breast Breast Breast Breast Breast   Fatigue (lethargy, malaise, asthenia) Moderate (e.g., decrease in performance status by 1 ECOG level or 20% Karnofsky) or causing difficulty performing some activities Moderate (e.g., decrease in performance status by 1 ECOG level or 20% Karnofsky) or causing  difficulty performing some activities None None None None   Fever (in the absence of neutropenia) None None None None None None   Radiation Dermatitis Skin necrosis or ulceration of full thickness dermis and may include bleeding not induced by minor trauma or abrasion Faint erythema or dry desquamation None None None None   Lymphatics Normal Normal Normal Normal Normal Normal   RT - Pain due to RT Disabling Severe pain, pain or analgesics severely interfering with activities of daily living None None None None   Dyspnea Normal Normal Normal Normal Normal Normal         IMPRESSION:  Cancer Staging  No matching staging information was found for the patient.    PLAN:  No change in treatment plan. Continue to use mepital    Disposition:  Treatment plan reviewed. Questions answered. Continue therapy outlined.     Chaparrita Sorenson M.D.    Orders Placed This Encounter   • HYDROcodone-acetaminophen (NORCO) 5-325 MG Tab per tablet   • Consent for Opiate Prescription

## 2021-12-23 ENCOUNTER — HOSPITAL ENCOUNTER (OUTPATIENT)
Dept: RADIATION ONCOLOGY | Facility: MEDICAL CENTER | Age: 81
End: 2021-12-23
Payer: COMMERCIAL

## 2021-12-23 LAB
CHEMOTHERAPY INFUSION START DATE: NORMAL
CHEMOTHERAPY RECORDS: 1000
CHEMOTHERAPY RECORDS: 2.5
CHEMOTHERAPY RECORDS: NORMAL
CHEMOTHERAPY RX CANCER: NORMAL
DATE 1ST CHEMO CANCER: NORMAL
RAD ONC ARIA COURSE LAST TREATMENT DATE: NORMAL
RAD ONC ARIA COURSE TREATMENT ELAPSED DAYS: NORMAL
RAD ONC ARIA REFERENCE POINT DOSAGE GIVEN TO DATE: 2.5
RAD ONC ARIA REFERENCE POINT DOSAGE GIVEN TO DATE: 2.52
RAD ONC ARIA REFERENCE POINT ID: NORMAL
RAD ONC ARIA REFERENCE POINT ID: NORMAL
RAD ONC ARIA REFERENCE POINT SESSION DOSAGE GIVEN: 2.5
RAD ONC ARIA REFERENCE POINT SESSION DOSAGE GIVEN: 2.52

## 2021-12-23 PROCEDURE — 77014 PR CT GUIDANCE PLACEMENT RAD THERAPY FIELDS: CPT | Mod: 26 | Performed by: RADIOLOGY

## 2021-12-23 PROCEDURE — 77385 HCHG IMRT DELIVERY SIMPLE: CPT | Performed by: RADIOLOGY

## 2021-12-27 ENCOUNTER — HOSPITAL ENCOUNTER (OUTPATIENT)
Dept: RADIATION ONCOLOGY | Facility: MEDICAL CENTER | Age: 81
End: 2021-12-27
Payer: COMMERCIAL

## 2021-12-27 VITALS
SYSTOLIC BLOOD PRESSURE: 140 MMHG | BODY MASS INDEX: 23.18 KG/M2 | HEART RATE: 76 BPM | DIASTOLIC BLOOD PRESSURE: 73 MMHG | WEIGHT: 148 LBS | OXYGEN SATURATION: 95 % | TEMPERATURE: 97.8 F

## 2021-12-27 LAB
CHEMOTHERAPY INFUSION START DATE: NORMAL
CHEMOTHERAPY RECORDS: 1000
CHEMOTHERAPY RECORDS: 2.5
CHEMOTHERAPY RECORDS: NORMAL
CHEMOTHERAPY RX CANCER: NORMAL
DATE 1ST CHEMO CANCER: NORMAL
RAD ONC ARIA COURSE LAST TREATMENT DATE: NORMAL
RAD ONC ARIA COURSE TREATMENT ELAPSED DAYS: NORMAL
RAD ONC ARIA REFERENCE POINT DOSAGE GIVEN TO DATE: 5
RAD ONC ARIA REFERENCE POINT DOSAGE GIVEN TO DATE: 5.04
RAD ONC ARIA REFERENCE POINT ID: NORMAL
RAD ONC ARIA REFERENCE POINT ID: NORMAL
RAD ONC ARIA REFERENCE POINT SESSION DOSAGE GIVEN: 2.5
RAD ONC ARIA REFERENCE POINT SESSION DOSAGE GIVEN: 2.52

## 2021-12-27 PROCEDURE — 77014 PR CT GUIDANCE PLACEMENT RAD THERAPY FIELDS: CPT | Mod: 26 | Performed by: RADIOLOGY

## 2021-12-27 PROCEDURE — 77385 HCHG IMRT DELIVERY SIMPLE: CPT | Performed by: RADIOLOGY

## 2021-12-27 ASSESSMENT — PAIN SCALES - GENERAL: PAINLEVEL: 10=SEVERE PAIN

## 2021-12-27 ASSESSMENT — FIBROSIS 4 INDEX: FIB4 SCORE: 0.69

## 2021-12-28 NOTE — ON TREATMENT VISIT
ON TREATMENT NOTE  RADIATION ONCOLOGY DEPARTMENT    Patient name:  Bg Andrea    Primary Physician:  No primary care provider on file. MRN: [id number]  CSN: [id number]   Referring physician:  Marina Gregory M.D. : 1940, 81 y.o.     ENCOUNTER DATE:  21    DIAGNOSIS:    No matching staging information was found for the patient.    TREATMENT SUMMARY:  Radiation Treatments     Active   Plans   R_Breast   Most recent treatment: Dose planned: 250 cGy (fraction 25 of 25 on 2021)   Total: Dose planned: 6,250 cGy   Elapsed Days: 35 @ [id number]      R_Breast_Bst   Most recent treatment: Dose planned: 250 cGy (fraction 2 of 4 on 2021)   Total: Dose planned: 1,000 cGy   Elapsed Days: 40 @ [id number]      Reference Points   R_Breast   Most recent treatment: Dose given: 250 cGy (on 2021)   Total: Dose given: 6,250 cGy   Elapsed Days: 35 @ [id number]      R_Breast CP   Most recent treatment: Dose given: 262 cGy (on 2021)   Total: Dose given: 6,547 cGy   Elapsed Days: 35 @ [id number]      R_Breast_Bst   Most recent treatment: Dose given: 250 cGy (on 2021)   Total: Dose given: 500 cGy   Elapsed Days: 40 @ [id number]      R_Breast_Bst CP   Most recent treatment: Dose given: 252 cGy (on 2021)   Total: Dose given: 504 cGy   Elapsed Days: 40 @ [id number]                    SUBJECTIVE:   Patient complaining of severe pain and discomfort in the irradiated area.      VITAL SIGNS:  There were no vitals taken for this visit.      Pain Assessment 2021 2021 12/15/2021   Pain Score 10 10 7   Pain Loc Chest Breast Chest   Some recent data might be hidden          PHYSICAL EXAM:  On exam he has moist desquamation throughout the nipple.  This is extremely tender.  He also has a lot of dry desquamation through the rest of the irradiated field.      Toxicity Assessment 2021 12/15/2021 2021 2021 2021 2021   Toxicity Assessment Breast Breast  Breast Breast Breast Breast   Fatigue (lethargy, malaise, asthenia) Moderate (e.g., decrease in performance status by 1 ECOG level or 20% Karnofsky) or causing difficulty performing some activities Moderate (e.g., decrease in performance status by 1 ECOG level or 20% Karnofsky) or causing difficulty performing some activities None None None None   Fever (in the absence of neutropenia) None None None None None None   Radiation Dermatitis Skin necrosis or ulceration of full thickness dermis and may include bleeding not induced by minor trauma or abrasion Faint erythema or dry desquamation None None None None   Lymphatics Normal Normal Normal Normal Normal Normal   RT - Pain due to RT Disabling Severe pain, pain or analgesics severely interfering with activities of daily living None None None None   Dyspnea Normal Normal Normal Normal Normal Normal         IMPRESSION:  Cancer Staging  No matching staging information was found for the patient.    PLAN:  I have called in Silvadene cream and pain medication to Dr. KATZ who [facility].      Disposition:  Treatment plan reviewed. Questions answered. Continue therapy outlined.     Chaparrita Sorenson M.D.    No orders of the defined types were placed in this encounter.

## 2022-01-03 ENCOUNTER — HOSPITAL ENCOUNTER (OUTPATIENT)
Dept: RADIATION ONCOLOGY | Facility: MEDICAL CENTER | Age: 82
End: 2022-01-31
Attending: RADIOLOGY
Payer: COMMERCIAL

## 2022-01-03 ENCOUNTER — HOSPITAL ENCOUNTER (OUTPATIENT)
Dept: RADIATION ONCOLOGY | Facility: MEDICAL CENTER | Age: 82
End: 2022-01-03

## 2022-01-03 LAB
CHEMOTHERAPY INFUSION START DATE: NORMAL
CHEMOTHERAPY RECORDS: 1000
CHEMOTHERAPY RECORDS: 2.5
CHEMOTHERAPY RECORDS: NORMAL
CHEMOTHERAPY RX CANCER: NORMAL
DATE 1ST CHEMO CANCER: NORMAL
RAD ONC ARIA COURSE LAST TREATMENT DATE: NORMAL
RAD ONC ARIA COURSE TREATMENT ELAPSED DAYS: NORMAL
RAD ONC ARIA REFERENCE POINT DOSAGE GIVEN TO DATE: 7.5
RAD ONC ARIA REFERENCE POINT DOSAGE GIVEN TO DATE: 7.57
RAD ONC ARIA REFERENCE POINT ID: NORMAL
RAD ONC ARIA REFERENCE POINT ID: NORMAL
RAD ONC ARIA REFERENCE POINT SESSION DOSAGE GIVEN: 2.5
RAD ONC ARIA REFERENCE POINT SESSION DOSAGE GIVEN: 2.52

## 2022-01-03 PROCEDURE — 77385 HCHG IMRT DELIVERY SIMPLE: CPT | Performed by: RADIOLOGY

## 2022-01-03 PROCEDURE — 77336 RADIATION PHYSICS CONSULT: CPT | Performed by: RADIOLOGY

## 2022-01-03 PROCEDURE — 77014 PR CT GUIDANCE PLACEMENT RAD THERAPY FIELDS: CPT | Mod: 26 | Performed by: RADIOLOGY

## 2022-01-04 ENCOUNTER — HOSPITAL ENCOUNTER (OUTPATIENT)
Dept: RADIATION ONCOLOGY | Facility: MEDICAL CENTER | Age: 82
End: 2022-01-04

## 2022-01-04 LAB
CHEMOTHERAPY INFUSION START DATE: NORMAL
CHEMOTHERAPY INFUSION START DATE: NORMAL
CHEMOTHERAPY INFUSION STOP DATE: NORMAL
CHEMOTHERAPY RECORDS: 1000
CHEMOTHERAPY RECORDS: 1000
CHEMOTHERAPY RECORDS: 2.5
CHEMOTHERAPY RECORDS: 6250
CHEMOTHERAPY RECORDS: NORMAL
CHEMOTHERAPY RX CANCER: NORMAL
CHEMOTHERAPY RX CANCER: NORMAL
DATE 1ST CHEMO CANCER: NORMAL
DATE 1ST CHEMO CANCER: NORMAL
RAD ONC ARIA COURSE LAST TREATMENT DATE: NORMAL
RAD ONC ARIA COURSE LAST TREATMENT DATE: NORMAL
RAD ONC ARIA COURSE TREATMENT ELAPSED DAYS: NORMAL
RAD ONC ARIA COURSE TREATMENT ELAPSED DAYS: NORMAL
RAD ONC ARIA REFERENCE POINT DOSAGE GIVEN TO DATE: 10
RAD ONC ARIA REFERENCE POINT DOSAGE GIVEN TO DATE: 10
RAD ONC ARIA REFERENCE POINT DOSAGE GIVEN TO DATE: 10.09
RAD ONC ARIA REFERENCE POINT DOSAGE GIVEN TO DATE: 10.09
RAD ONC ARIA REFERENCE POINT DOSAGE GIVEN TO DATE: 62.5
RAD ONC ARIA REFERENCE POINT DOSAGE GIVEN TO DATE: 65.47
RAD ONC ARIA REFERENCE POINT ID: NORMAL
RAD ONC ARIA REFERENCE POINT SESSION DOSAGE GIVEN: 2.5
RAD ONC ARIA REFERENCE POINT SESSION DOSAGE GIVEN: 2.52

## 2022-01-04 PROCEDURE — 77014 PR CT GUIDANCE PLACEMENT RAD THERAPY FIELDS: CPT | Mod: 26 | Performed by: RADIOLOGY

## 2022-01-04 PROCEDURE — 77385 HCHG IMRT DELIVERY SIMPLE: CPT | Performed by: RADIOLOGY

## 2022-01-04 PROCEDURE — 77427 RADIATION TX MANAGEMENT X5: CPT | Performed by: RADIOLOGY

## 2022-03-01 ENCOUNTER — HOSPITAL ENCOUNTER (OUTPATIENT)
Dept: RADIOLOGY | Facility: MEDICAL CENTER | Age: 82
End: 2022-03-01
Attending: INTERNAL MEDICINE
Payer: COMMERCIAL

## 2022-03-11 ENCOUNTER — APPOINTMENT (OUTPATIENT)
Dept: RADIOLOGY | Facility: MEDICAL CENTER | Age: 82
End: 2022-03-11
Attending: INTERNAL MEDICINE
Payer: COMMERCIAL

## 2022-03-25 ENCOUNTER — HOSPITAL ENCOUNTER (OUTPATIENT)
Dept: RADIOLOGY | Facility: MEDICAL CENTER | Age: 82
End: 2022-03-25
Attending: INTERNAL MEDICINE
Payer: COMMERCIAL

## 2022-03-25 ENCOUNTER — HOSPITAL ENCOUNTER (OUTPATIENT)
Dept: RADIOLOGY | Facility: MEDICAL CENTER | Age: 82
End: 2022-03-25

## 2022-03-25 DIAGNOSIS — C50.621: ICD-10-CM

## 2022-03-25 PROCEDURE — A9552 F18 FDG: HCPCS

## 2022-07-07 ENCOUNTER — HOSPITAL ENCOUNTER (OUTPATIENT)
Dept: RADIOLOGY | Facility: MEDICAL CENTER | Age: 82
End: 2022-07-07
Attending: INTERNAL MEDICINE
Payer: COMMERCIAL

## 2022-07-07 DIAGNOSIS — C50.929 MALIGNANT NEOPLASM OF MALE BREAST, UNSPECIFIED ESTROGEN RECEPTOR STATUS, UNSPECIFIED LATERALITY, UNSPECIFIED SITE OF BREAST (HCC): ICD-10-CM

## 2022-07-07 PROCEDURE — A9270 NON-COVERED ITEM OR SERVICE: HCPCS | Performed by: INTERNAL MEDICINE

## 2022-07-07 PROCEDURE — 700112 HCHG RX REV CODE 229: Performed by: INTERNAL MEDICINE

## 2022-07-07 PROCEDURE — 74221 X-RAY XM ESOPHAGUS 2CNTRST: CPT

## 2022-07-07 PROCEDURE — 700117 HCHG RX CONTRAST REV CODE 255: Performed by: INTERNAL MEDICINE

## 2022-07-07 RX ADMIN — ANTACID/ANTIFLATULENT 1 PACKET: 380; 550; 10; 10 GRANULE, EFFERVESCENT ORAL at 13:30

## 2022-07-07 RX ADMIN — BARIUM SULFATE 700 MG: 700 TABLET ORAL at 13:30

## 2022-08-01 ENCOUNTER — HOSPITAL ENCOUNTER (OUTPATIENT)
Dept: RADIOLOGY | Facility: MEDICAL CENTER | Age: 82
End: 2022-07-13
Attending: PHYSICIAN ASSISTANT
Payer: COMMERCIAL

## 2022-08-01 ENCOUNTER — HOSPITAL ENCOUNTER (OUTPATIENT)
Dept: RADIOLOGY | Facility: MEDICAL CENTER | Age: 82
End: 2022-08-01
Attending: PHYSICIAN ASSISTANT
Payer: COMMERCIAL

## 2022-08-01 DIAGNOSIS — C50.929 MALIGNANT NEOPLASM OF MALE BREAST, UNSPECIFIED ESTROGEN RECEPTOR STATUS, UNSPECIFIED LATERALITY, UNSPECIFIED SITE OF BREAST (HCC): ICD-10-CM

## 2022-08-01 PROCEDURE — A9552 F18 FDG: HCPCS
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
